# Patient Record
Sex: FEMALE | Race: WHITE | Employment: OTHER | URBAN - METROPOLITAN AREA
[De-identification: names, ages, dates, MRNs, and addresses within clinical notes are randomized per-mention and may not be internally consistent; named-entity substitution may affect disease eponyms.]

---

## 2017-03-29 PROBLEM — M17.0 PRIMARY OSTEOARTHRITIS OF BOTH KNEES: Status: ACTIVE | Noted: 2017-03-29

## 2017-11-02 PROBLEM — K59.04 CHRONIC IDIOPATHIC CONSTIPATION: Status: ACTIVE | Noted: 2017-11-02

## 2018-03-01 PROBLEM — M25.561 ACUTE PAIN OF RIGHT KNEE: Status: ACTIVE | Noted: 2018-03-01

## 2018-06-12 ENCOUNTER — HOSPITAL ENCOUNTER (OUTPATIENT)
Age: 58
Discharge: HOME | End: 2018-06-12
Payer: MEDICARE

## 2018-06-12 LAB
PARATHYROID HORMONE INTACT: 32 PG/ML (ref 15–65)
T4 FREE: 1.18 NG/DL (ref 0.93–1.7)
TSH SERPL DL<=0.05 MIU/L-ACNC: 1.92 UIU/ML (ref 0.27–4.2)
VITAMIN D 25-HYDROXY: 109 NG/ML (ref 30–100)

## 2018-06-12 PROCEDURE — 36415 COLL VENOUS BLD VENIPUNCTURE: CPT

## 2018-06-12 PROCEDURE — 83970 ASSAY OF PARATHORMONE: CPT

## 2018-06-12 PROCEDURE — 82024 ASSAY OF ACTH: CPT

## 2018-06-12 PROCEDURE — 84439 ASSAY OF FREE THYROXINE: CPT

## 2018-06-12 PROCEDURE — 84443 ASSAY THYROID STIM HORMONE: CPT

## 2018-06-12 PROCEDURE — 82306 VITAMIN D 25 HYDROXY: CPT

## 2018-06-14 LAB — ADRENOCORTICOTROPIC HORMONE: 17 PG/ML (ref 6–58)

## 2018-09-04 ENCOUNTER — HOSPITAL ENCOUNTER (OUTPATIENT)
Age: 58
Discharge: HOME | End: 2018-09-04
Payer: MEDICARE

## 2018-09-04 LAB
ALBUMIN SERPL-MCNC: 3.8 G/DL (ref 3.5–5.2)
ALP BLD-CCNC: 79 U/L (ref 35–104)
ALT SERPL-CCNC: 19 U/L (ref 0–32)
ANION GAP SERPL CALCULATED.3IONS-SCNC: 9 MMOL/L (ref 7–16)
AST SERPL-CCNC: 22 U/L (ref 0–31)
BILIRUB SERPL-MCNC: 0.5 MG/DL (ref 0–1.2)
BUN BLDV-MCNC: 16 MG/DL (ref 6–20)
CALCIUM SERPL-MCNC: 9.2 MG/DL (ref 8.6–10.2)
CHLORIDE BLD-SCNC: 107 MMOL/L (ref 98–107)
CHOLESTEROL, TOTAL: 191 MG/DL (ref 0–199)
CO2: 26 MMOL/L (ref 22–29)
CREAT SERPL-MCNC: 0.9 MG/DL (ref 0.5–1)
FERRITIN: 53 NG/ML
FOLATE: >20 NG/ML (ref 4.8–24.2)
GFR AFRICAN AMERICAN: >60
GFR NON-AFRICAN AMERICAN: >60 ML/MIN/1.73
GLUCOSE BLD-MCNC: 97 MG/DL (ref 74–109)
HCT VFR BLD CALC: 35.9 % (ref 34–48)
HEMOGLOBIN: 12 G/DL (ref 11.5–15.5)
MCH RBC QN AUTO: 31.6 PG (ref 26–35)
MCHC RBC AUTO-ENTMCNC: 33.4 % (ref 32–34.5)
MCV RBC AUTO: 94.5 FL (ref 80–99.9)
PDW BLD-RTO: 12.8 FL (ref 11.5–15)
PLATELET # BLD: 232 E9/L (ref 130–450)
PMV BLD AUTO: 9.7 FL (ref 7–12)
POTASSIUM SERPL-SCNC: 3.9 MMOL/L (ref 3.5–5)
PREALBUMIN: 20 MG/DL (ref 20–40)
RBC # BLD: 3.8 E12/L (ref 3.5–5.5)
SODIUM BLD-SCNC: 142 MMOL/L (ref 132–146)
TOTAL PROTEIN: 6.3 G/DL (ref 6.4–8.3)
TRIGL SERPL-MCNC: 88 MG/DL (ref 0–149)
VITAMIN B-12: 984 PG/ML (ref 211–946)
VITAMIN D 25-HYDROXY: 75 NG/ML (ref 30–100)
WBC # BLD: 4.4 E9/L (ref 4.5–11.5)

## 2018-09-04 PROCEDURE — 80053 COMPREHEN METABOLIC PANEL: CPT

## 2018-09-04 PROCEDURE — 36415 COLL VENOUS BLD VENIPUNCTURE: CPT

## 2018-09-04 PROCEDURE — 82746 ASSAY OF FOLIC ACID SERUM: CPT

## 2018-09-04 PROCEDURE — 84425 ASSAY OF VITAMIN B-1: CPT

## 2018-09-04 PROCEDURE — 82607 VITAMIN B-12: CPT

## 2018-09-04 PROCEDURE — 84134 ASSAY OF PREALBUMIN: CPT

## 2018-09-04 PROCEDURE — 82728 ASSAY OF FERRITIN: CPT

## 2018-09-04 PROCEDURE — 82306 VITAMIN D 25 HYDROXY: CPT

## 2018-09-04 PROCEDURE — 84478 ASSAY OF TRIGLYCERIDES: CPT

## 2018-09-04 PROCEDURE — 85027 COMPLETE CBC AUTOMATED: CPT

## 2018-09-04 PROCEDURE — 82465 ASSAY BLD/SERUM CHOLESTEROL: CPT

## 2018-09-08 LAB — VITAMIN B1 WHOLE BLOOD: 222 NMOL/L (ref 70–180)

## 2018-09-20 ENCOUNTER — INITIAL CONSULT (OUTPATIENT)
Dept: BARIATRICS/WEIGHT MGMT | Age: 58
End: 2018-09-20
Payer: MEDICARE

## 2018-09-20 ENCOUNTER — PREP FOR PROCEDURE (OUTPATIENT)
Dept: BARIATRICS/WEIGHT MGMT | Age: 58
End: 2018-09-20

## 2018-09-20 ENCOUNTER — OFFICE VISIT (OUTPATIENT)
Dept: BARIATRICS/WEIGHT MGMT | Age: 58
End: 2018-09-20
Payer: MEDICARE

## 2018-09-20 VITALS
DIASTOLIC BLOOD PRESSURE: 67 MMHG | TEMPERATURE: 98.2 F | WEIGHT: 167 LBS | SYSTOLIC BLOOD PRESSURE: 109 MMHG | HEIGHT: 65 IN | RESPIRATION RATE: 20 BRPM | HEART RATE: 88 BPM | BODY MASS INDEX: 27.82 KG/M2

## 2018-09-20 DIAGNOSIS — K91.2 MALNUTRITION FOLLOWING GASTROINTESTINAL SURGERY: Primary | ICD-10-CM

## 2018-09-20 DIAGNOSIS — Z71.3 DIETARY COUNSELING: Primary | ICD-10-CM

## 2018-09-20 PROCEDURE — 99999 PR OFFICE/OUTPT VISIT,PROCEDURE ONLY: CPT | Performed by: SURGERY

## 2018-09-20 PROCEDURE — 99214 OFFICE O/P EST MOD 30 MIN: CPT | Performed by: SURGERY

## 2018-09-20 PROCEDURE — 99212 OFFICE O/P EST SF 10 MIN: CPT

## 2018-09-20 RX ORDER — SODIUM CHLORIDE, SODIUM LACTATE, POTASSIUM CHLORIDE, CALCIUM CHLORIDE 600; 310; 30; 20 MG/100ML; MG/100ML; MG/100ML; MG/100ML
INJECTION, SOLUTION INTRAVENOUS CONTINUOUS
Status: CANCELLED | OUTPATIENT
Start: 2018-09-20 | End: 2019-09-20

## 2018-09-20 RX ORDER — MELATONIN 3 MG
10 TABLET ORAL DAILY
COMMUNITY
End: 2020-10-22

## 2018-09-20 NOTE — PROGRESS NOTES
daily Yes Historical Provider, MD   Black Currant Seed Oil 500 MG CAPS Take 500 mg by mouth daily Yes Historical Provider, MD   niacin (SLO-NIACIN) 500 MG extended release tablet Take 500 mg by mouth daily Yes Historical Provider, MD   Turmeric 500 MG CAPS Take by mouth daily Yes Historical Provider, MD   cetirizine (ZYRTEC) 10 MG tablet Take 10 mg by mouth daily Yes Historical Provider, MD   guaiFENesin (MUCINEX) 600 MG extended release tablet Take 600 mg by mouth as needed  Yes Historical Provider, MD   Ginkgo Biloba Extract 60 MG CAPS Take by mouth Daily Yes Historical Provider, MD   Probiotic Product (PROBIOTIC DAILY PO) Take by mouth nightly Yes Historical Provider, MD   doxycycline (ORACEA) 40 MG delayed release capsule Take 40 mg by mouth nightly Yes Historical Provider, MD   buprenorphine (BUTRANS) 10 MCG/HR [address] [facility] [address] 1 patch onto the skin once a week Yes Historical Provider, MD   Ascorbic Acid (VITAMIN C) 250 MG tablet Take 250 mg by mouth daily Yes Historical Provider, MD   zolpidem (AMBIEN) 10 MG tablet Take 5 mg by mouth nightly as needed for Sleep Yes Historical Provider, MD   olopatadine (PATANASE) 0.6 % SOLN nassl soln 2 sprays by Nasal route 2 times daily Yes Historical Provider, MD   topiramate (TOPAMAX) 25 MG tablet Take 25 mg by mouth 2 times daily  Yes Historical Provider, MD   Wheat Dextrin (BENEFIBER) POWD Take by mouth Daily 3 Tablespoons Yes Historical Provider, MD   folic acid (FOLVITE) 1 MG tablet Take 800 mcg by mouth daily  Yes Historical Provider, MD   SUMAtriptan (IMITREX) 100 MG tablet Take 100 mg by mouth once as needed for Migraine Yes Historical Provider, MD   diazepam (VALIUM) 5 MG tablet Take 0.5 tablets by mouth nightly  Yes Historical Provider, MD   montelukast (SINGULAIR) 10 MG tablet Take 1 tablet by mouth daily Yes Historical Provider, MD   amphetamine-dextroamphetamine (ADDERALL, 20MG,) 20 MG tablet Take 20 mg by mouth daily .  Yes Historical Provider, MD   levothyroxine

## 2018-09-21 ENCOUNTER — TELEPHONE (OUTPATIENT)
Dept: BARIATRICS/WEIGHT MGMT | Age: 58
End: 2018-09-21

## 2018-09-21 NOTE — PROGRESS NOTES
[id number] [facility]                                                                                                                     PRE OP INSTRUCTIONS FOR  Kristi Melendrez        Date: 9/21/2018    Date and time of surgery:  9/24/18     Arrival Time: to be called    1. Do not eat or drink anything after 12 midnight prior to surgery. This includes no water, chewing gum, mints or ice chips. 2. Take the following pills with a small sip of water on the morning of Surgery:  Topamax     3. Diabetics may take evening dose of insulin but none after midnight. If you feel symptomatic or low blood sugar take 1-2 ounces of apple juice only. 4. Aspirin, Ibuprofen, Advil, Naproxen, Vitamin E and other Anti-inflammatory products should be stopped  before surgery  as directed by your physician. 5. Check with your Doctor regarding stopping Plavix, Coumadin, Lovenox, Fragmin or other blood thinners. 6. Do not smoke,use illicit drugs and do not drink any alcoholic beverages 24 hours prior to surgery. 7. You may brush your teeth and gargle the morning of surgery. DO NOT SWALLOW WATER    8. You MUST make arrangements for a responsible adult to take you home after your surgery. You will not be allowed to leave alone or drive yourself home. It is strongly suggested someone stay with you the first 24 hrs. Your surgery will be cancelled if you do not have a ride home. 9. A parent/legal guardian must accompany a child scheduled for surgery and plan to stay at the hospital until the child is discharged. Please do not bring other children with you. 10. Please wear simple, loose fitting clothing to the hospital.  Devyn Mcculloughspenser not bring valuables (money, credit cards, checkbooks, etc.) Do not wear any makeup (including no eye makeup) or nail polish on your fingers or toes. 11. DO NOT wear any jewelry or piercings on day of surgery. All body piercing jewelry must be removed. 12.  Shower the night before surgery

## 2018-09-24 ENCOUNTER — HOSPITAL ENCOUNTER (OUTPATIENT)
Age: 58
Setting detail: OUTPATIENT SURGERY
Discharge: HOME | End: 2018-09-24
Attending: SURGERY | Admitting: SURGERY
Payer: MEDICARE

## 2018-09-24 VITALS
BODY MASS INDEX: 27.57 KG/M2 | RESPIRATION RATE: 16 BRPM | SYSTOLIC BLOOD PRESSURE: 110 MMHG | OXYGEN SATURATION: 98 % | DIASTOLIC BLOOD PRESSURE: 70 MMHG | TEMPERATURE: 123 F | HEART RATE: 67 BPM | WEIGHT: 165.5 LBS | HEIGHT: 65 IN

## 2018-09-24 PROCEDURE — 43235 EGD DIAGNOSTIC BRUSH WASH: CPT | Performed by: SURGERY

## 2018-09-24 PROCEDURE — 2709999900 HC NON-CHARGEABLE SUPPLY: Performed by: SURGERY

## 2018-09-24 PROCEDURE — 6370000000 HC RX 637 (ALT 250 FOR IP): Performed by: SURGERY

## 2018-09-24 PROCEDURE — 2580000003 HC RX 258: Performed by: SURGERY

## 2018-09-24 PROCEDURE — 3609017100 HC EGD: Performed by: SURGERY

## 2018-09-24 PROCEDURE — 7100000010 HC PHASE II RECOVERY - FIRST 15 MIN: Performed by: SURGERY

## 2018-09-24 PROCEDURE — 7100000011 HC PHASE II RECOVERY - ADDTL 15 MIN: Performed by: SURGERY

## 2018-09-24 PROCEDURE — C1773 RET DEV, INSERTABLE: HCPCS | Performed by: SURGERY

## 2018-09-24 RX ORDER — SODIUM CHLORIDE, SODIUM LACTATE, POTASSIUM CHLORIDE, CALCIUM CHLORIDE 600; 310; 30; 20 MG/100ML; MG/100ML; MG/100ML; MG/100ML
INJECTION, SOLUTION INTRAVENOUS CONTINUOUS
Status: DISCONTINUED | OUTPATIENT
Start: 2018-09-24 | End: 2018-09-24 | Stop reason: HOSPADM

## 2018-09-24 RX ADMIN — SODIUM CHLORIDE, POTASSIUM CHLORIDE, SODIUM LACTATE AND CALCIUM CHLORIDE: 600; 310; 30; 20 INJECTION, SOLUTION INTRAVENOUS at 11:25

## 2018-09-24 ASSESSMENT — PAIN SCALES - GENERAL
PAINLEVEL_OUTOF10: 0
PAINLEVEL_OUTOF10: 0

## 2018-09-24 ASSESSMENT — PAIN - FUNCTIONAL ASSESSMENT: PAIN_FUNCTIONAL_ASSESSMENT: 0-10

## 2018-09-24 ASSESSMENT — PAIN DESCRIPTION - DESCRIPTORS: DESCRIPTORS: BURNING;ACHING

## 2018-09-24 NOTE — PROGRESS NOTES
Pt coughing with fluids at times. Swallows pudding without difficulty. Also tolerated cookies. Will wait to drink more fluids at home. Discussed signs of aspiration pneumonia.    Jennifer Virgen  9/24/2018  [id number]

## 2018-09-24 NOTE — OP NOTE
had returned to baseline status. Anaesthesia was present during the procedure. Complications: none    OPERATIONS: The patient was placed on the table, throat was numbed with lidocaine spray. Bite block was placed. A lubricated Pediatric scope was easily passed into the upper esophagus which looked normal. The distal esophagus looked normal other than mild esophagitis and a small hiatal hernia. The scope was passed into the stomach pouch which had normal gastric mucosa. The anastomosis was open 10 mm and there was no inflammation of the jejunal mucosa with no  ulcer in the jejunum. The scope was retroflexed and there was a 1 cm hiatal hernia. The scope was passed as far down as possible and no pathology was seen so it was withdrawn. The patient tolerated the procedure well. Plan:   Ok to have the hiatal hernia repaired if the reflux gets worse.     Physician Signature: Electronically signed by Dr. Barbara Brooks M.D.

## 2018-09-24 NOTE — H&P
Franchesca Child  9/24/2018   [address]    H&P     Franchesca Child is a 46 y.o. female who is 15 years post Open Edwin-en-Y Gastric Bypass surgery. Labs normal other than low protein. Says she is swelling and the weight is up. Gets constipated with she eats carbs. Says she is not eating any protein. She had been stable at 100 pound weight loss for 7 years then had 40 pounds of weight gain over the next few years. Lost 8 pounds last year then regained it this year. Biggest problem is constipation due to narcotics for chronic pains. Used a fentanyl patch but had allergic reactions to the patches and constipation worse, now on a Butrans [facility]. She says she still can't eat a lot of meats or they get stuck and she throws up. She is meeting fluid recommendations of at least 64 ounces per day and is meeting protein recommendations. She is not exercising.     Kofjca=221 lb (75.297 kg)  Today's weight represents a total weight loss of 63 pounds since surgery with 42 pounds regained since the lowest weight. Past medical history   has a past medical history of Acid reflux; Arthritis; Asthma; Clotting disorder ([address] CRPS (complex regional pain syndrome type I); Heart palpitations; History of constipation; History of DVT (deep vein thrombosis); History of phlebitis; History of shortness of breath; Hx of blood clots; Hyperlipidemia; Hypothyroidism; Lower leg DVT (deep venous thrombosis) ([address] Migraines; and Varicose veins. Past surgical history   has a past surgical history that includes Tonsillectomy and adenoidectomy (1964); laparoscopy (1980 & 1984); Upper gastrointestinal endoscopy (06/13/2003); Edwin-en-Y Gastric Bypass (09/30/2003); hernia repair (07/02/2004); other surgical history (1997, 7/12/16); and Colonoscopy (09/27/2017). Medications   Prior to Visit Medications    Medication Sig Taking?  Authorizing Provider   DHEA 10 MG TABS Take 10 mg by mouth daily  Historical Provider, MD   Black Currant Seed Oil 500 MG CAPS Take 500 mg by mouth daily  Historical Provider, MD   niacin (SLO-NIACIN) 500 MG extended release tablet Take 500 mg by mouth daily  Historical Provider, MD   Methylnaltrexone Bromide (RELISTOR) 150 MG TABS Take 3 tablets by mouth daily  Historical Provider, MD   Turmeric 500 MG CAPS Take by mouth daily  Historical Provider, MD   cetirizine (ZYRTEC) 10 MG tablet Take 10 mg by mouth daily  Historical Provider, MD   guaiFENesin (MUCINEX) 600 MG extended release tablet Take 600 mg by mouth as needed   Historical Provider, MD   Ginkgo Biloba Extract 60 MG CAPS Take by mouth Daily  Historical Provider, MD   Probiotic Product (PROBIOTIC DAILY PO) Take by mouth nightly  Historical Provider, MD   doxycycline (ORACEA) 40 MG delayed release capsule Take 40 mg by mouth nightly  Historical Provider, MD   buprenorphine (BUTRANS) 10 MCG/HR [address] [facility] [address] 1 patch onto the skin once a week  Historical Provider, MD   Ascorbic Acid (VITAMIN C) 250 MG tablet Take 250 mg by mouth daily  Historical Provider, MD   zolpidem (AMBIEN) 10 MG tablet Take 5 mg by mouth nightly as needed for Sleep  Historical Provider, MD   olopatadine (PATANASE) 0.6 % SOLN nassl soln 2 sprays by Nasal route 2 times daily  Historical Provider, MD   topiramate (TOPAMAX) 25 MG tablet Take 25 mg by mouth 2 times daily   Historical Provider, MD   Wheat Dextrin (BENEFIBER) POWD Take by mouth Daily 3 Tablespoons  Historical Provider, MD   folic acid (FOLVITE) 1 MG tablet Take 800 mcg by mouth daily   Historical Provider, MD   SUMAtriptan (IMITREX) 100 MG tablet Take 100 mg by mouth once as needed for Migraine  Historical Provider, MD   diazepam (VALIUM) 5 MG tablet Take 0.5 tablets by mouth nightly   Historical Provider, MD   montelukast (SINGULAIR) 10 MG tablet Take 1 tablet by mouth daily  Historical Provider, MD   amphetamine-dextroamphetamine (ADDERALL, 20MG,) 20 MG tablet Take 20 mg by mouth daily . Historical Provider, MD   levothyroxine (SYNTHROID) 25 MCG tablet Take 50 mcg by mouth daily   Historical Provider, MD   rosuvastatin (CRESTOR) 5 MG tablet Take 5 mg by mouth every other day   Historical Provider, MD   diphenhydrAMINE (BENADRYL) 25 MG tablet Take 25 mg by mouth nightly. Historical Provider, MD   estradiol (ESTRACE VAGINAL) 0.1 MG/GM vaginal cream Place 2 g vaginally once a week. Historical Provider, MD   cycloSPORINE (RESTASIS) 0.05 % ophthalmic emulsion Place 1 drop into both eyes 2 times daily. Historical Provider, MD   amitriptyline (ELAVIL) 10 MG tablet Take 10 mg by mouth nightly. Historical Provider, MD   duloxetine (CYMBALTA) 20 MG capsule Take 40 mg by mouth daily   Historical Provider, MD   baclofen (LIORESAL) 20 MG tablet Take 20 mg by mouth Daily   Historical Provider, MD   esomeprazole (NEXIUM) 40 MG capsule Take 40 mg by mouth every morning (before breakfast). Historical Provider, MD   Docusate Calcium (STOOL SOFTENER PO) Take 1 capsule by mouth 3 times daily. Historical Provider, MD   polycarbophil (FIBERCON) 625 MG tablet Take 625 mg by mouth daily. Historical Provider, MD   vitamin D (ERGOCALCIFEROL) 50951 UNIT CAPS capsule Take 50,000 Units by mouth once a week   Historical Provider, MD   aspirin 81 MG EC tablet Take 81 mg by mouth daily. Historical Provider, MD   celecoxib (CELEBREX) 200 MG capsule Take 200 mg by mouth daily. Historical Provider, MD   Calcium Citrate-Vitamin D 200-250 MG-UNIT TABS Take 1 tablet by mouth 2 times daily.     Historical Provider, MD   therapeutic multivitamin-minerals (THERAGRAN-M) tablet Take 1 tablet by mouth daily   Historical Provider, MD   Ferrous Fumarate (IRON) 18 MG TBCR Take 2 tablets by mouth daily   Historical Provider, MD   Coenzyme Q-10 100 MG CAPS Take 1 capsule by mouth daily   Historical Provider, MD   Magnesium 250 MG TABS Take 500 mg by mouth every evening   Historical Provider, MD   Omega-3 Fatty Acids (OMEGA-3 FISH OIL) 1200 MG CAPS Take 1 capsule by mouth daily. Historical Provider, MD   vitamin E 400 UNIT capsule Take 400 Units by mouth daily. Historical Provider, MD       Review of Systems   Psychological ROS: positive for - anxiety and depression, narcotic use for chronic pains  Respiratory ROS: negative  Cardiovascular ROS: negative  Gastrointestinal ROS: constipation       Physical exam:      Blood pressure (!) 116/53, pulse 70, temperature 98.6 °F (37 °C), temperature source Temporal, resp. rate 14, height 5' 5\" (1.651 m), weight 165 lb 8 oz (75.1 kg), SpO2 100 %. General appearance: alert, appears stated age and cooperative  Head: Normocephalic, without obvious abnormality, atraumatic  Neck: no adenopathy, no carotid bruit, no JVD, supple, symmetrical, trachea midline and thyroid not enlarged, symmetric, no tenderness/mass/nodules  Lungs: clear to auscultation bilaterally  Heart: regular rate and rhythm  Abdomen: soft, non-tender; bowel sounds normal; no masses,  no organomegaly  Extremities: extremities normal, atraumatic, no cyanosis or edema     Assessment: severe constipation post Edwin-en- Y Gastric Bypass. Low protein level on labs. She is on Nexium daily and occasionally does complain of GERD,  does not have sleep apnea,  does not have diabetes,  does not have hypertension off medical treatment. Cholesterol and triglycerides are normal. Colonoscopy last year normal, won't need another one for 10 years.     Plan:   EGD to check for a stricture. Ok to try any of the irritable bowel constipation medications that will work for her. Need to take less narcotics. Stop eating carbs and sweets.  Go back on the Protein drinks.        Physician Signature: Electronically signed by Dr. Iggy Barajas MD

## 2018-11-01 ENCOUNTER — PREP FOR PROCEDURE (OUTPATIENT)
Dept: BARIATRICS/WEIGHT MGMT | Age: 58
End: 2018-11-01

## 2018-11-01 ENCOUNTER — TELEPHONE (OUTPATIENT)
Dept: BARIATRICS/WEIGHT MGMT | Age: 58
End: 2018-11-01

## 2018-11-01 RX ORDER — SODIUM CHLORIDE, SODIUM LACTATE, POTASSIUM CHLORIDE, CALCIUM CHLORIDE 600; 310; 30; 20 MG/100ML; MG/100ML; MG/100ML; MG/100ML
INJECTION, SOLUTION INTRAVENOUS CONTINUOUS
Status: CANCELLED | OUTPATIENT
Start: 2018-11-01

## 2018-11-01 RX ORDER — SODIUM CHLORIDE 0.9 % (FLUSH) 0.9 %
10 SYRINGE (ML) INJECTION EVERY 12 HOURS SCHEDULED
Status: CANCELLED | OUTPATIENT
Start: 2018-11-01

## 2018-11-01 RX ORDER — SODIUM CHLORIDE 0.9 % (FLUSH) 0.9 %
10 SYRINGE (ML) INJECTION PRN
Status: CANCELLED | OUTPATIENT
Start: 2018-11-01

## 2018-11-01 NOTE — TELEPHONE ENCOUNTER
Patient  Called in and is having continued problems with reflux. She has switched to Pepcid and still having same issues. Per order of Dr. Katy Gomes patient needs scheduled for [facility]. Patient would like his surgery to be done on 11/19/2018. She will see her PCP for medical clearance. Call placed to surgery schedulers and patient placed on google calendar. Chart to Dr. Katy Gomes to enter orders.

## 2018-11-13 ENCOUNTER — TELEPHONE (OUTPATIENT)
Dept: ORTHOPEDIC SURGERY | Age: 58
End: 2018-11-13

## 2018-11-16 RX ORDER — FAMOTIDINE 20 MG/1
20 TABLET, FILM COATED ORAL ONCE
COMMUNITY
End: 2022-01-18

## 2018-11-16 RX ORDER — ZINC GLUCONATE 50 MG
15 TABLET ORAL DAILY
COMMUNITY
End: 2020-11-04 | Stop reason: DRUGHIGH

## 2018-11-19 ENCOUNTER — HOSPITAL ENCOUNTER (INPATIENT)
Age: 58
LOS: 1 days | Discharge: HOME | DRG: 328 | End: 2018-11-20
Attending: SURGERY | Admitting: SURGERY
Payer: MEDICARE

## 2018-11-19 ENCOUNTER — ANESTHESIA (OUTPATIENT)
Dept: OPERATING ROOM | Age: 58
DRG: 328 | End: 2018-11-19
Payer: MEDICARE

## 2018-11-19 ENCOUNTER — ANESTHESIA EVENT (OUTPATIENT)
Dept: OPERATING ROOM | Age: 58
DRG: 328 | End: 2018-11-19
Payer: MEDICARE

## 2018-11-19 VITALS
RESPIRATION RATE: 18 BRPM | SYSTOLIC BLOOD PRESSURE: 103 MMHG | DIASTOLIC BLOOD PRESSURE: 60 MMHG | TEMPERATURE: 97.5 F | OXYGEN SATURATION: 100 %

## 2018-11-19 PROBLEM — K44.9 HIATAL HERNIA: Status: ACTIVE | Noted: 2018-11-19

## 2018-11-19 PROBLEM — K46.9 HERNIA OF ABDOMINAL CAVITY: Status: ACTIVE | Noted: 2018-11-19

## 2018-11-19 PROCEDURE — 3700000001 HC ADD 15 MINUTES (ANESTHESIA): Performed by: SURGERY

## 2018-11-19 PROCEDURE — 1200000000 HC SEMI PRIVATE

## 2018-11-19 PROCEDURE — 15734 MUSCLE-SKIN GRAFT TRUNK: CPT | Performed by: SURGERY

## 2018-11-19 PROCEDURE — 8E0W4CZ ROBOTIC ASSISTED PROCEDURE OF TRUNK REGION, PERCUTANEOUS ENDOSCOPIC APPROACH: ICD-10-PCS | Performed by: SURGERY

## 2018-11-19 PROCEDURE — 6370000000 HC RX 637 (ALT 250 FOR IP): Performed by: ANESTHESIOLOGY

## 2018-11-19 PROCEDURE — 2580000003 HC RX 258: Performed by: SURGERY

## 2018-11-19 PROCEDURE — 2709999900 HC NON-CHARGEABLE SUPPLY: Performed by: SURGERY

## 2018-11-19 PROCEDURE — 6370000000 HC RX 637 (ALT 250 FOR IP): Performed by: SURGERY

## 2018-11-19 PROCEDURE — 3700000000 HC ANESTHESIA ATTENDED CARE: Performed by: SURGERY

## 2018-11-19 PROCEDURE — 7100000010 HC PHASE II RECOVERY - FIRST 15 MIN: Performed by: SURGERY

## 2018-11-19 PROCEDURE — 3600000009 HC SURGERY ROBOT BASE: Performed by: SURGERY

## 2018-11-19 PROCEDURE — 2780000010 HC IMPLANT OTHER: Performed by: SURGERY

## 2018-11-19 PROCEDURE — S2900 ROBOTIC SURGICAL SYSTEM: HCPCS | Performed by: SURGERY

## 2018-11-19 PROCEDURE — 6360000002 HC RX W HCPCS: Performed by: NURSE ANESTHETIST, CERTIFIED REGISTERED

## 2018-11-19 PROCEDURE — C9113 INJ PANTOPRAZOLE SODIUM, VIA: HCPCS | Performed by: SURGERY

## 2018-11-19 PROCEDURE — G0378 HOSPITAL OBSERVATION PER HR: HCPCS

## 2018-11-19 PROCEDURE — 2720000010 HC SURG SUPPLY STERILE: Performed by: SURGERY

## 2018-11-19 PROCEDURE — 2500000003 HC RX 250 WO HCPCS: Performed by: NURSE ANESTHETIST, CERTIFIED REGISTERED

## 2018-11-19 PROCEDURE — 6360000002 HC RX W HCPCS: Performed by: ANESTHESIOLOGY

## 2018-11-19 PROCEDURE — 0KXJ4ZZ TRANSFER LEFT THORAX MUSCLE, PERCUTANEOUS ENDOSCOPIC APPROACH: ICD-10-PCS | Performed by: SURGERY

## 2018-11-19 PROCEDURE — 0BUT4JZ SUPPLEMENT DIAPHRAGM WITH SYNTHETIC SUBSTITUTE, PERCUTANEOUS ENDOSCOPIC APPROACH: ICD-10-PCS | Performed by: SURGERY

## 2018-11-19 PROCEDURE — C1713 ANCHOR/SCREW BN/BN,TIS/BN: HCPCS | Performed by: SURGERY

## 2018-11-19 PROCEDURE — 7100000000 HC PACU RECOVERY - FIRST 15 MIN: Performed by: SURGERY

## 2018-11-19 PROCEDURE — 6360000002 HC RX W HCPCS: Performed by: SURGERY

## 2018-11-19 PROCEDURE — 7100000001 HC PACU RECOVERY - ADDTL 15 MIN: Performed by: SURGERY

## 2018-11-19 PROCEDURE — 7100000011 HC PHASE II RECOVERY - ADDTL 15 MIN: Performed by: SURGERY

## 2018-11-19 PROCEDURE — 43282 LAP PARAESOPH HER RPR W/MESH: CPT | Performed by: SURGERY

## 2018-11-19 PROCEDURE — 3600000019 HC SURGERY ROBOT ADDTL 15MIN: Performed by: SURGERY

## 2018-11-19 PROCEDURE — 43239 EGD BIOPSY SINGLE/MULTIPLE: CPT | Performed by: SURGERY

## 2018-11-19 PROCEDURE — 2500000003 HC RX 250 WO HCPCS: Performed by: SURGERY

## 2018-11-19 DEVICE — SEALANT HEMSTAT 5ML HUM FIBRIN THROM 2 VI APPL DEV EVICEL: Type: IMPLANTABLE DEVICE | Status: FUNCTIONAL

## 2018-11-19 DEVICE — MESH SURG W8XL8CM FLAT SHT BIO-A: Type: IMPLANTABLE DEVICE | Site: ESOPHAGUS | Status: FUNCTIONAL

## 2018-11-19 RX ORDER — ONDANSETRON 2 MG/ML
4 INJECTION INTRAMUSCULAR; INTRAVENOUS EVERY 6 HOURS PRN
Status: DISCONTINUED | OUTPATIENT
Start: 2018-11-19 | End: 2018-11-20 | Stop reason: HOSPADM

## 2018-11-19 RX ORDER — SCOLOPAMINE TRANSDERMAL SYSTEM 1 MG/1
1 PATCH, EXTENDED RELEASE TRANSDERMAL
Status: DISCONTINUED | OUTPATIENT
Start: 2018-11-19 | End: 2018-11-20 | Stop reason: HOSPADM

## 2018-11-19 RX ORDER — BUPIVACAINE HYDROCHLORIDE AND EPINEPHRINE 2.5; 5 MG/ML; UG/ML
INJECTION, SOLUTION EPIDURAL; INFILTRATION; INTRACAUDAL; PERINEURAL PRN
Status: DISCONTINUED | OUTPATIENT
Start: 2018-11-19 | End: 2018-11-19 | Stop reason: HOSPADM

## 2018-11-19 RX ORDER — MIDAZOLAM HYDROCHLORIDE 1 MG/ML
INJECTION INTRAMUSCULAR; INTRAVENOUS PRN
Status: DISCONTINUED | OUTPATIENT
Start: 2018-11-19 | End: 2018-11-19 | Stop reason: SDUPTHER

## 2018-11-19 RX ORDER — NEOSTIGMINE METHYLSULFATE 0.5 MG/ML
INJECTION, SOLUTION INTRAVENOUS PRN
Status: DISCONTINUED | OUTPATIENT
Start: 2018-11-19 | End: 2018-11-19 | Stop reason: SDUPTHER

## 2018-11-19 RX ORDER — FENTANYL CITRATE 50 UG/ML
INJECTION, SOLUTION INTRAMUSCULAR; INTRAVENOUS
Status: DISPENSED
Start: 2018-11-19 | End: 2018-11-20

## 2018-11-19 RX ORDER — PROMETHAZINE HYDROCHLORIDE 25 MG/1
25 SUPPOSITORY RECTAL EVERY 6 HOURS PRN
Status: DISCONTINUED | OUTPATIENT
Start: 2018-11-19 | End: 2018-11-20 | Stop reason: HOSPADM

## 2018-11-19 RX ORDER — DEXAMETHASONE SODIUM PHOSPHATE 10 MG/ML
INJECTION INTRAMUSCULAR; INTRAVENOUS PRN
Status: DISCONTINUED | OUTPATIENT
Start: 2018-11-19 | End: 2018-11-19 | Stop reason: SDUPTHER

## 2018-11-19 RX ORDER — MEPERIDINE HYDROCHLORIDE 25 MG/ML
12.5 INJECTION INTRAMUSCULAR; INTRAVENOUS; SUBCUTANEOUS EVERY 5 MIN PRN
Status: DISCONTINUED | OUTPATIENT
Start: 2018-11-19 | End: 2018-11-19 | Stop reason: HOSPADM

## 2018-11-19 RX ORDER — SODIUM CHLORIDE, SODIUM LACTATE, POTASSIUM CHLORIDE, CALCIUM CHLORIDE 600; 310; 30; 20 MG/100ML; MG/100ML; MG/100ML; MG/100ML
INJECTION, SOLUTION INTRAVENOUS CONTINUOUS
Status: DISCONTINUED | OUTPATIENT
Start: 2018-11-19 | End: 2018-11-20 | Stop reason: HOSPADM

## 2018-11-19 RX ORDER — PANTOPRAZOLE SODIUM 40 MG/10ML
40 INJECTION, POWDER, LYOPHILIZED, FOR SOLUTION INTRAVENOUS DAILY
Status: DISCONTINUED | OUTPATIENT
Start: 2018-11-19 | End: 2018-11-20 | Stop reason: HOSPADM

## 2018-11-19 RX ORDER — ROCURONIUM BROMIDE 10 MG/ML
INJECTION, SOLUTION INTRAVENOUS PRN
Status: DISCONTINUED | OUTPATIENT
Start: 2018-11-19 | End: 2018-11-19 | Stop reason: SDUPTHER

## 2018-11-19 RX ORDER — GLYCOPYRROLATE 1 MG/5 ML
SYRINGE (ML) INTRAVENOUS PRN
Status: DISCONTINUED | OUTPATIENT
Start: 2018-11-19 | End: 2018-11-19 | Stop reason: SDUPTHER

## 2018-11-19 RX ORDER — 0.9 % SODIUM CHLORIDE 0.9 %
10 VIAL (ML) INJECTION DAILY
Status: DISCONTINUED | OUTPATIENT
Start: 2018-11-19 | End: 2018-11-20 | Stop reason: HOSPADM

## 2018-11-19 RX ORDER — ACETAMINOPHEN 160 MG/5ML
650 SUSPENSION, ORAL (FINAL DOSE FORM) ORAL EVERY 4 HOURS PRN
Status: DISCONTINUED | OUTPATIENT
Start: 2018-11-19 | End: 2018-11-20 | Stop reason: HOSPADM

## 2018-11-19 RX ORDER — SODIUM CHLORIDE 0.9 % (FLUSH) 0.9 %
10 SYRINGE (ML) INJECTION EVERY 12 HOURS SCHEDULED
Status: DISCONTINUED | OUTPATIENT
Start: 2018-11-19 | End: 2018-11-19 | Stop reason: HOSPADM

## 2018-11-19 RX ORDER — HYDROCODONE BITARTRATE AND ACETAMINOPHEN 5; 325 MG/1; MG/1
2 TABLET ORAL PRN
Status: COMPLETED | OUTPATIENT
Start: 2018-11-19 | End: 2018-11-19

## 2018-11-19 RX ORDER — DIPHENHYDRAMINE HYDROCHLORIDE 50 MG/ML
25 INJECTION INTRAMUSCULAR; INTRAVENOUS EVERY 6 HOURS PRN
Status: DISCONTINUED | OUTPATIENT
Start: 2018-11-19 | End: 2018-11-20 | Stop reason: HOSPADM

## 2018-11-19 RX ORDER — FENTANYL CITRATE 50 UG/ML
25 INJECTION, SOLUTION INTRAMUSCULAR; INTRAVENOUS EVERY 5 MIN PRN
Status: DISCONTINUED | OUTPATIENT
Start: 2018-11-19 | End: 2018-11-19 | Stop reason: HOSPADM

## 2018-11-19 RX ORDER — FENTANYL CITRATE 50 UG/ML
50 INJECTION, SOLUTION INTRAMUSCULAR; INTRAVENOUS EVERY 5 MIN PRN
Status: DISCONTINUED | OUTPATIENT
Start: 2018-11-19 | End: 2018-11-19 | Stop reason: SDUPTHER

## 2018-11-19 RX ORDER — MEPERIDINE HYDROCHLORIDE 25 MG/ML
INJECTION INTRAMUSCULAR; INTRAVENOUS; SUBCUTANEOUS
Status: DISPENSED
Start: 2018-11-19 | End: 2018-11-20

## 2018-11-19 RX ORDER — FENTANYL CITRATE 50 UG/ML
50 INJECTION, SOLUTION INTRAMUSCULAR; INTRAVENOUS EVERY 5 MIN PRN
Status: DISCONTINUED | OUTPATIENT
Start: 2018-11-19 | End: 2018-11-19 | Stop reason: HOSPADM

## 2018-11-19 RX ORDER — HYDROCODONE BITARTRATE AND ACETAMINOPHEN 5; 325 MG/1; MG/1
1 TABLET ORAL PRN
Status: COMPLETED | OUTPATIENT
Start: 2018-11-19 | End: 2018-11-19

## 2018-11-19 RX ORDER — SODIUM CHLORIDE 0.9 % (FLUSH) 0.9 %
10 SYRINGE (ML) INJECTION PRN
Status: DISCONTINUED | OUTPATIENT
Start: 2018-11-19 | End: 2018-11-19 | Stop reason: HOSPADM

## 2018-11-19 RX ORDER — KETOROLAC TROMETHAMINE 30 MG/ML
30 INJECTION, SOLUTION INTRAMUSCULAR; INTRAVENOUS EVERY 6 HOURS
Status: DISCONTINUED | OUTPATIENT
Start: 2018-11-19 | End: 2018-11-20 | Stop reason: HOSPADM

## 2018-11-19 RX ORDER — ONDANSETRON 2 MG/ML
INJECTION INTRAMUSCULAR; INTRAVENOUS PRN
Status: DISCONTINUED | OUTPATIENT
Start: 2018-11-19 | End: 2018-11-19 | Stop reason: SDUPTHER

## 2018-11-19 RX ORDER — FENTANYL CITRATE 50 UG/ML
INJECTION, SOLUTION INTRAMUSCULAR; INTRAVENOUS PRN
Status: DISCONTINUED | OUTPATIENT
Start: 2018-11-19 | End: 2018-11-19 | Stop reason: SDUPTHER

## 2018-11-19 RX ORDER — FENTANYL CITRATE 50 UG/ML
25 INJECTION, SOLUTION INTRAMUSCULAR; INTRAVENOUS EVERY 5 MIN PRN
Status: DISCONTINUED | OUTPATIENT
Start: 2018-11-19 | End: 2018-11-19 | Stop reason: SDUPTHER

## 2018-11-19 RX ORDER — PROPOFOL 10 MG/ML
INJECTION, EMULSION INTRAVENOUS PRN
Status: DISCONTINUED | OUTPATIENT
Start: 2018-11-19 | End: 2018-11-19 | Stop reason: SDUPTHER

## 2018-11-19 RX ADMIN — SODIUM CHLORIDE, POTASSIUM CHLORIDE, SODIUM LACTATE AND CALCIUM CHLORIDE: 600; 310; 30; 20 INJECTION, SOLUTION INTRAVENOUS at 10:39

## 2018-11-19 RX ADMIN — SODIUM CHLORIDE, POTASSIUM CHLORIDE, SODIUM LACTATE AND CALCIUM CHLORIDE: 600; 310; 30; 20 INJECTION, SOLUTION INTRAVENOUS at 12:05

## 2018-11-19 RX ADMIN — ROCURONIUM BROMIDE 40 MG: 10 INJECTION, SOLUTION INTRAVENOUS at 10:43

## 2018-11-19 RX ADMIN — PROPOFOL 150 MG: 10 INJECTION, EMULSION INTRAVENOUS at 10:43

## 2018-11-19 RX ADMIN — Medication 0.6 MG: at 12:51

## 2018-11-19 RX ADMIN — FENTANYL CITRATE 50 MCG: 50 INJECTION, SOLUTION INTRAMUSCULAR; INTRAVENOUS at 10:54

## 2018-11-19 RX ADMIN — FENTANYL CITRATE 50 MCG: 50 INJECTION, SOLUTION INTRAMUSCULAR; INTRAVENOUS at 13:27

## 2018-11-19 RX ADMIN — SODIUM CHLORIDE, POTASSIUM CHLORIDE, SODIUM LACTATE AND CALCIUM CHLORIDE: 600; 310; 30; 20 INJECTION, SOLUTION INTRAVENOUS at 21:38

## 2018-11-19 RX ADMIN — NEOSTIGMINE METHYLSULFATE 3 MG: 0.5 INJECTION INTRAVENOUS at 12:51

## 2018-11-19 RX ADMIN — MIDAZOLAM 2 MG: 1 INJECTION INTRAMUSCULAR; INTRAVENOUS at 10:39

## 2018-11-19 RX ADMIN — SODIUM CHLORIDE, POTASSIUM CHLORIDE, SODIUM LACTATE AND CALCIUM CHLORIDE: 600; 310; 30; 20 INJECTION, SOLUTION INTRAVENOUS at 10:22

## 2018-11-19 RX ADMIN — DIPHENHYDRAMINE HYDROCHLORIDE 25 MG: 50 INJECTION, SOLUTION INTRAMUSCULAR; INTRAVENOUS at 20:31

## 2018-11-19 RX ADMIN — FENTANYL CITRATE 25 MCG: 50 INJECTION, SOLUTION INTRAMUSCULAR; INTRAVENOUS at 14:05

## 2018-11-19 RX ADMIN — ROCURONIUM BROMIDE 10 MG: 10 INJECTION, SOLUTION INTRAVENOUS at 12:05

## 2018-11-19 RX ADMIN — FENTANYL CITRATE 50 MCG: 50 INJECTION, SOLUTION INTRAMUSCULAR; INTRAVENOUS at 12:10

## 2018-11-19 RX ADMIN — ROCURONIUM BROMIDE 10 MG: 10 INJECTION, SOLUTION INTRAVENOUS at 11:35

## 2018-11-19 RX ADMIN — DEXAMETHASONE SODIUM PHOSPHATE 10 MG: 10 INJECTION INTRAMUSCULAR; INTRAVENOUS at 10:43

## 2018-11-19 RX ADMIN — FENTANYL CITRATE 50 MCG: 50 INJECTION, SOLUTION INTRAMUSCULAR; INTRAVENOUS at 12:55

## 2018-11-19 RX ADMIN — SODIUM CHLORIDE 10 ML: 9 INJECTION INTRAMUSCULAR; INTRAVENOUS; SUBCUTANEOUS at 20:41

## 2018-11-19 RX ADMIN — FENTANYL CITRATE 50 MCG: 50 INJECTION, SOLUTION INTRAMUSCULAR; INTRAVENOUS at 13:32

## 2018-11-19 RX ADMIN — PANTOPRAZOLE SODIUM 40 MG: 40 INJECTION, POWDER, FOR SOLUTION INTRAVENOUS at 20:41

## 2018-11-19 RX ADMIN — ONDANSETRON 4 MG: 2 INJECTION INTRAMUSCULAR; INTRAVENOUS at 12:51

## 2018-11-19 RX ADMIN — FENTANYL CITRATE 100 MCG: 50 INJECTION, SOLUTION INTRAMUSCULAR; INTRAVENOUS at 10:43

## 2018-11-19 RX ADMIN — HYDROCODONE BITARTRATE AND ACETAMINOPHEN 1 TABLET: 5; 325 TABLET ORAL at 16:55

## 2018-11-19 RX ADMIN — FENTANYL CITRATE 25 MCG: 50 INJECTION, SOLUTION INTRAMUSCULAR; INTRAVENOUS at 14:13

## 2018-11-19 RX ADMIN — KETOROLAC TROMETHAMINE 30 MG: 30 INJECTION, SOLUTION INTRAMUSCULAR; INTRAVENOUS at 20:31

## 2018-11-19 ASSESSMENT — PULMONARY FUNCTION TESTS
PIF_VALUE: 24
PIF_VALUE: 28
PIF_VALUE: 30
PIF_VALUE: 26
PIF_VALUE: 30
PIF_VALUE: 24
PIF_VALUE: 2
PIF_VALUE: 29
PIF_VALUE: 28
PIF_VALUE: 20
PIF_VALUE: 31
PIF_VALUE: 28
PIF_VALUE: 29
PIF_VALUE: 29
PIF_VALUE: 32
PIF_VALUE: 29
PIF_VALUE: 25
PIF_VALUE: 29
PIF_VALUE: 28
PIF_VALUE: 2
PIF_VALUE: 20
PIF_VALUE: 28
PIF_VALUE: 28
PIF_VALUE: 31
PIF_VALUE: 30
PIF_VALUE: 29
PIF_VALUE: 19
PIF_VALUE: 31
PIF_VALUE: 29
PIF_VALUE: 30
PIF_VALUE: 2
PIF_VALUE: 1
PIF_VALUE: 30
PIF_VALUE: 2
PIF_VALUE: 30
PIF_VALUE: 16
PIF_VALUE: 27
PIF_VALUE: 28
PIF_VALUE: 2
PIF_VALUE: 31
PIF_VALUE: 32
PIF_VALUE: 16
PIF_VALUE: 1
PIF_VALUE: 29
PIF_VALUE: 30
PIF_VALUE: 24
PIF_VALUE: 30
PIF_VALUE: 25
PIF_VALUE: 30
PIF_VALUE: 29
PIF_VALUE: 27
PIF_VALUE: 31
PIF_VALUE: 27
PIF_VALUE: 20
PIF_VALUE: 31
PIF_VALUE: 26
PIF_VALUE: 21
PIF_VALUE: 17
PIF_VALUE: 27
PIF_VALUE: 31
PIF_VALUE: 31
PIF_VALUE: 28
PIF_VALUE: 29
PIF_VALUE: 20
PIF_VALUE: 28
PIF_VALUE: 28
PIF_VALUE: 30
PIF_VALUE: 2
PIF_VALUE: 24
PIF_VALUE: 31
PIF_VALUE: 14
PIF_VALUE: 28
PIF_VALUE: 27
PIF_VALUE: 28
PIF_VALUE: 30
PIF_VALUE: 30
PIF_VALUE: 29
PIF_VALUE: 29
PIF_VALUE: 30
PIF_VALUE: 2
PIF_VALUE: 27
PIF_VALUE: 28
PIF_VALUE: 30
PIF_VALUE: 31
PIF_VALUE: 28
PIF_VALUE: 30
PIF_VALUE: 2
PIF_VALUE: 29
PIF_VALUE: 26
PIF_VALUE: 26
PIF_VALUE: 27
PIF_VALUE: 27
PIF_VALUE: 31
PIF_VALUE: 21
PIF_VALUE: 26
PIF_VALUE: 28
PIF_VALUE: 29
PIF_VALUE: 27
PIF_VALUE: 29
PIF_VALUE: 27
PIF_VALUE: 30
PIF_VALUE: 28
PIF_VALUE: 25
PIF_VALUE: 31
PIF_VALUE: 30
PIF_VALUE: 28
PIF_VALUE: 31
PIF_VALUE: 30
PIF_VALUE: 2
PIF_VALUE: 27
PIF_VALUE: 18
PIF_VALUE: 31
PIF_VALUE: 30
PIF_VALUE: 27
PIF_VALUE: 29
PIF_VALUE: 30
PIF_VALUE: 29
PIF_VALUE: 27
PIF_VALUE: 2
PIF_VALUE: 27
PIF_VALUE: 29
PIF_VALUE: 2
PIF_VALUE: 26
PIF_VALUE: 29
PIF_VALUE: 30
PIF_VALUE: 28
PIF_VALUE: 19
PIF_VALUE: 16
PIF_VALUE: 29
PIF_VALUE: 28
PIF_VALUE: 29
PIF_VALUE: 30
PIF_VALUE: 29
PIF_VALUE: 31
PIF_VALUE: 27
PIF_VALUE: 29
PIF_VALUE: 30
PIF_VALUE: 30
PIF_VALUE: 26
PIF_VALUE: 28
PIF_VALUE: 25
PIF_VALUE: 30
PIF_VALUE: 2
PIF_VALUE: 29
PIF_VALUE: 31

## 2018-11-19 ASSESSMENT — PAIN DESCRIPTION - PAIN TYPE
TYPE: SURGICAL PAIN
TYPE: ACUTE PAIN;SURGICAL PAIN
TYPE: SURGICAL PAIN
TYPE: SURGICAL PAIN

## 2018-11-19 ASSESSMENT — PAIN DESCRIPTION - DESCRIPTORS
DESCRIPTORS: CONSTANT;DISCOMFORT;SHARP
DESCRIPTORS: CONSTANT;DISCOMFORT;DULL
DESCRIPTORS: ACHING;THROBBING
DESCRIPTORS: DISCOMFORT
DESCRIPTORS: SHARP;DISCOMFORT
DESCRIPTORS: CONSTANT;DISCOMFORT;DULL

## 2018-11-19 ASSESSMENT — PAIN DESCRIPTION - PROGRESSION
CLINICAL_PROGRESSION: NOT CHANGED
CLINICAL_PROGRESSION: GRADUALLY IMPROVING
CLINICAL_PROGRESSION: GRADUALLY WORSENING
CLINICAL_PROGRESSION: GRADUALLY WORSENING

## 2018-11-19 ASSESSMENT — PAIN DESCRIPTION - LOCATION
LOCATION: ABDOMEN
LOCATION: SHOULDER

## 2018-11-19 ASSESSMENT — PAIN DESCRIPTION - FREQUENCY
FREQUENCY: CONTINUOUS
FREQUENCY: INTERMITTENT
FREQUENCY: CONTINUOUS

## 2018-11-19 ASSESSMENT — PAIN DESCRIPTION - ORIENTATION
ORIENTATION: ANTERIOR;UPPER
ORIENTATION: LEFT
ORIENTATION: ANTERIOR;UPPER
ORIENTATION: ANTERIOR;UPPER;OTHER (COMMENT)
ORIENTATION: ANTERIOR;UPPER

## 2018-11-19 ASSESSMENT — PAIN - FUNCTIONAL ASSESSMENT: PAIN_FUNCTIONAL_ASSESSMENT: 0-10

## 2018-11-19 ASSESSMENT — PAIN SCALES - GENERAL
PAINLEVEL_OUTOF10: 3
PAINLEVEL_OUTOF10: 0
PAINLEVEL_OUTOF10: 0
PAINLEVEL_OUTOF10: 4
PAINLEVEL_OUTOF10: 8
PAINLEVEL_OUTOF10: 8
PAINLEVEL_OUTOF10: 7
PAINLEVEL_OUTOF10: 2
PAINLEVEL_OUTOF10: 9

## 2018-11-19 ASSESSMENT — PAIN DESCRIPTION - ONSET
ONSET: ON-GOING
ONSET: SUDDEN
ONSET: ON-GOING
ONSET: ON-GOING

## 2018-11-19 NOTE — ANESTHESIA PRE PROCEDURE
09/24/18 110/70   09/20/18 109/67       NPO Status: Time of last liquid consumption: 0800 (sip with meds)                        Time of last solid consumption: 2200                        Date of last liquid consumption: 11/19/18                        Date of last solid food consumption: 11/18/18    BMI:   Wt Readings from Last 3 Encounters:   11/19/18 167 lb (75.8 kg)   09/24/18 165 lb 8 oz (75.1 kg)   09/20/18 167 lb (75.8 kg)     Body mass index is 26.95 kg/m². CBC:   Lab Results   Component Value Date    WBC 4.4 09/04/2018    RBC 3.80 09/04/2018    HGB 12.0 09/04/2018    HCT 35.9 09/04/2018    MCV 94.5 09/04/2018    RDW 12.8 09/04/2018     09/04/2018       CMP:   Lab Results   Component Value Date     09/04/2018    K 3.9 09/04/2018     09/04/2018    CO2 26 09/04/2018    BUN 16 09/04/2018    CREATININE 0.9 09/04/2018    GFRAA >60 09/04/2018    LABGLOM >60 09/04/2018    GLUCOSE 97 09/04/2018    GLUCOSE 81 10/03/2011    PROT 6.3 09/04/2018    CALCIUM 9.2 09/04/2018    BILITOT 0.5 09/04/2018    ALKPHOS 79 09/04/2018    AST 22 09/04/2018    ALT 19 09/04/2018       POC Tests: No results for input(s): POCGLU, POCNA, POCK, POCCL, POCBUN, POCHEMO, POCHCT in the last 72 hours.     Coags:   Lab Results   Component Value Date    PROTIME 11.9 09/27/2017    INR 1.0 09/27/2017       HCG (If Applicable): No results found for: PREGTESTUR, PREGSERUM, HCG, HCGQUANT     ABGs: No results found for: PHART, PO2ART, EWM8IIF, KYL0OQD, BEART, T1JZVSOC     Type & Screen (If Applicable):  No results found for: LABABO, [address]    Anesthesia Evaluation  Patient summary reviewed  Airway: Mallampati: II  TM distance: >3 FB   Neck ROM: full  Mouth opening: > = 3 FB Dental:      Comment: Multiple caps, bridge work    Pulmonary: breath sounds clear to auscultation  (+) asthma:                            Cardiovascular:    (+) hyperlipidemia        Rhythm: regular  Rate: normal                    Neuro/Psych:   (+)

## 2018-11-19 NOTE — H&P
Keon Nair  11/19/2018   [address]    H&P     Keon Nair is a 46 y.o. female who is 15 years post Open Edwin-en-Y Gastric Bypass surgery. Takes Pepcid for heartburn. EGD showed esophagitis and a small hiatal hernia. She had been stable at 100 pound weight loss for 7 years then had 40 pounds of weight gain over the next few years. Lost 8 pounds last year then regained it this year. Has severe constipation due to narcotics for chronic pains. Used a fentanyl patch but had allergic reactions to the patches and constipation worse, now on a Butrans patch [facility] the [facility]. She says she still can't eat a lot of meats or they get stuck and she throws up.      Qwquwi=943 lb (75.297 kg)  Today's weight represents a total weight loss of 63 pounds since surgery with 42 pounds regained since the lowest weight. Past medical history   has a past medical history of Acid reflux; Arthritis; Asthma; Clotting disorder ([address] CRPS (complex regional pain syndrome type I); Heart palpitations; History of constipation; History of DVT (deep vein thrombosis); History of phlebitis; History of shortness of breath; Hx of blood clots; Hyperlipidemia; Hypothyroidism; Lower leg DVT (deep venous thrombosis) ([address] Migraines; RSD (reflex sympathetic dystrophy); and Varicose veins. Past surgical history   has a past surgical history that includes Tonsillectomy and adenoidectomy (1964); laparoscopy (1980 & 1984); Upper gastrointestinal endoscopy (06/13/2003); Edwin-en-Y Gastric Bypass (09/30/2003); hernia repair (07/02/2004); other surgical history (1997, 7/12/16); Colonoscopy (09/27/2017); and pr egd transoral biopsy single/multiple (N/A, 9/24/2018). Medications   Prior to Visit Medications    Medication Sig Taking?  Authorizing Provider   famotidine (PEPCID) 20 MG tablet Take 20 mg by mouth once Yes Historical Provider, MD   zinc gluconate 50 MG tablet Take 15 mg by mouth daily Yes Historical Provider, MD   loteprednol (ALREX) 0.2 % SUSP 1 drop 4 times daily Yes Historical Provider, MD   bisacodyl (DULCOLAX) 5 MG EC tablet Take 5 mg by mouth daily as needed for Constipation Yes Historical Provider, MD   DHEA 10 MG TABS Take 10 mg by mouth daily Yes Historical Provider, MD   Black Currant Seed Oil 500 MG CAPS Take 500 mg by mouth daily Yes Historical Provider, MD   niacin (SLO-NIACIN) 500 MG extended release tablet Take 500 mg by mouth daily Yes Historical Provider, MD   cetirizine (ZYRTEC) 10 MG tablet Take 10 mg by mouth daily Yes Historical Provider, MD   guaiFENesin (MUCINEX) 600 MG extended release tablet Take 600 mg by mouth as needed  Yes Historical Provider, MD   Ginkgo Biloba Extract 60 MG CAPS Take by mouth Daily Yes Historical Provider, MD   Probiotic Product (PROBIOTIC DAILY PO) Take by mouth nightly Yes Historical Provider, MD   buprenorphine (BUTRANS) 10 MCG/HR [address] 1 patch onto the skin once a week.  . Yes Historical Provider, MD   Ascorbic Acid (VITAMIN C) 250 MG tablet Take 250 mg by mouth daily Yes Historical Provider, MD   zolpidem (AMBIEN) 10 MG tablet Take 5 mg by mouth nightly as needed for Sleep Yes Historical Provider, MD   olopatadine (PATANASE) 0.6 % SOLN nassl soln 2 sprays by Nasal route 2 times daily Yes Historical Provider, MD   topiramate (TOPAMAX) 25 MG tablet Take 25 mg by mouth 2 times daily  Yes Historical Provider, MD   Wheat Dextrin (BENEFIBER) POWD Take by mouth Daily 3 Tablespoons Yes Historical Provider, MD   folic acid (FOLVITE) 1 MG tablet Take 800 mcg by mouth daily  Yes Historical Provider, MD   SUMAtriptan (IMITREX) 100 MG tablet Take 100 mg by mouth once as needed for Migraine Yes Historical Provider, MD   diazepam (VALIUM) 5 MG tablet Take 0.5 tablets by mouth nightly  Yes Historical Provider, MD   montelukast (SINGULAIR) 10 MG tablet Take 1 tablet by mouth daily Yes Historical Provider, MD   amphetamine-dextroamphetamine (ADDERALL, 20MG,) 20

## 2018-11-20 VITALS
TEMPERATURE: 99 F | HEIGHT: 66 IN | SYSTOLIC BLOOD PRESSURE: 120 MMHG | RESPIRATION RATE: 16 BRPM | BODY MASS INDEX: 26.84 KG/M2 | DIASTOLIC BLOOD PRESSURE: 70 MMHG | WEIGHT: 167 LBS | HEART RATE: 80 BPM | OXYGEN SATURATION: 98 %

## 2018-11-20 LAB
ALBUMIN SERPL-MCNC: 3.7 G/DL (ref 3.5–5.2)
ALP BLD-CCNC: 65 U/L (ref 35–104)
ALT SERPL-CCNC: 142 U/L (ref 0–32)
ANION GAP SERPL CALCULATED.3IONS-SCNC: 10 MMOL/L (ref 7–16)
AST SERPL-CCNC: 156 U/L (ref 0–31)
BILIRUB SERPL-MCNC: 0.6 MG/DL (ref 0–1.2)
BUN BLDV-MCNC: 11 MG/DL (ref 6–20)
CALCIUM SERPL-MCNC: 8.8 MG/DL (ref 8.6–10.2)
CHLORIDE BLD-SCNC: 104 MMOL/L (ref 98–107)
CO2: 27 MMOL/L (ref 22–29)
CREAT SERPL-MCNC: 0.7 MG/DL (ref 0.5–1)
GFR AFRICAN AMERICAN: >60
GFR NON-AFRICAN AMERICAN: >60 ML/MIN/1.73
GLUCOSE BLD-MCNC: 136 MG/DL (ref 74–99)
HCT VFR BLD CALC: 37.2 % (ref 34–48)
HEMOGLOBIN: 12.5 G/DL (ref 11.5–15.5)
MCH RBC QN AUTO: 32.1 PG (ref 26–35)
MCHC RBC AUTO-ENTMCNC: 33.6 % (ref 32–34.5)
MCV RBC AUTO: 95.6 FL (ref 80–99.9)
PDW BLD-RTO: 13 FL (ref 11.5–15)
PLATELET # BLD: 239 E9/L (ref 130–450)
PMV BLD AUTO: 9.6 FL (ref 7–12)
POTASSIUM SERPL-SCNC: 4.4 MMOL/L (ref 3.5–5)
RBC # BLD: 3.89 E12/L (ref 3.5–5.5)
SODIUM BLD-SCNC: 141 MMOL/L (ref 132–146)
TOTAL PROTEIN: 5.9 G/DL (ref 6.4–8.3)
WBC # BLD: 8.8 E9/L (ref 4.5–11.5)

## 2018-11-20 PROCEDURE — C9113 INJ PANTOPRAZOLE SODIUM, VIA: HCPCS | Performed by: SURGERY

## 2018-11-20 PROCEDURE — 96374 THER/PROPH/DIAG INJ IV PUSH: CPT

## 2018-11-20 PROCEDURE — 85027 COMPLETE CBC AUTOMATED: CPT

## 2018-11-20 PROCEDURE — 96376 TX/PRO/DX INJ SAME DRUG ADON: CPT

## 2018-11-20 PROCEDURE — G0378 HOSPITAL OBSERVATION PER HR: HCPCS

## 2018-11-20 PROCEDURE — 96372 THER/PROPH/DIAG INJ SC/IM: CPT

## 2018-11-20 PROCEDURE — 80053 COMPREHEN METABOLIC PANEL: CPT

## 2018-11-20 PROCEDURE — 96375 TX/PRO/DX INJ NEW DRUG ADDON: CPT

## 2018-11-20 PROCEDURE — 36415 COLL VENOUS BLD VENIPUNCTURE: CPT

## 2018-11-20 PROCEDURE — 6360000002 HC RX W HCPCS: Performed by: SURGERY

## 2018-11-20 PROCEDURE — 2580000003 HC RX 258: Performed by: SURGERY

## 2018-11-20 RX ADMIN — ENOXAPARIN SODIUM 40 MG: 40 INJECTION SUBCUTANEOUS at 09:07

## 2018-11-20 RX ADMIN — KETOROLAC TROMETHAMINE 30 MG: 30 INJECTION, SOLUTION INTRAMUSCULAR; INTRAVENOUS at 02:06

## 2018-11-20 RX ADMIN — SODIUM CHLORIDE 10 ML: 9 INJECTION INTRAMUSCULAR; INTRAVENOUS; SUBCUTANEOUS at 09:07

## 2018-11-20 RX ADMIN — SODIUM CHLORIDE, POTASSIUM CHLORIDE, SODIUM LACTATE AND CALCIUM CHLORIDE: 600; 310; 30; 20 INJECTION, SOLUTION INTRAVENOUS at 02:06

## 2018-11-20 RX ADMIN — DIPHENHYDRAMINE HYDROCHLORIDE 25 MG: 50 INJECTION, SOLUTION INTRAMUSCULAR; INTRAVENOUS at 02:10

## 2018-11-20 RX ADMIN — PANTOPRAZOLE SODIUM 40 MG: 40 INJECTION, POWDER, FOR SOLUTION INTRAVENOUS at 09:07

## 2018-11-20 RX ADMIN — DIPHENHYDRAMINE HYDROCHLORIDE 25 MG: 50 INJECTION, SOLUTION INTRAMUSCULAR; INTRAVENOUS at 09:07

## 2018-11-20 RX ADMIN — KETOROLAC TROMETHAMINE 30 MG: 30 INJECTION, SOLUTION INTRAMUSCULAR; INTRAVENOUS at 09:07

## 2018-11-20 ASSESSMENT — PAIN SCALES - GENERAL
PAINLEVEL_OUTOF10: 5
PAINLEVEL_OUTOF10: 4
PAINLEVEL_OUTOF10: 7
PAINLEVEL_OUTOF10: 2

## 2018-11-20 ASSESSMENT — PAIN DESCRIPTION - LOCATION: LOCATION: ABDOMEN

## 2018-11-20 ASSESSMENT — PAIN DESCRIPTION - PAIN TYPE: TYPE: ACUTE PAIN;SURGICAL PAIN

## 2018-11-20 ASSESSMENT — PAIN DESCRIPTION - DESCRIPTORS: DESCRIPTORS: ACHING;DISCOMFORT;SORE

## 2018-11-20 ASSESSMENT — PAIN DESCRIPTION - ORIENTATION: ORIENTATION: MID;LEFT;RIGHT

## 2018-11-20 NOTE — DISCHARGE SUMMARY
tenderness/mass/nodules  Lungs: clear to auscultation bilaterally  Heart: regular rate and rhythm  Abdomen: soft, incisions healing well, glue in place.   Extremities: extremities normal, atraumatic, no cyanosis or edema    Condition at discharge:  Stable  Disposition: home    Patient Instructions:      Medication List      CONTINUE taking these medications    ADDERALL (20MG) 20 MG tablet  Generic drug:  amphetamine-dextroamphetamine     amitriptyline 10 MG tablet  Commonly known as:  ELAVIL     aspirin 81 MG EC tablet     baclofen 20 MG tablet  Commonly known as:  LIORESAL     BENEFIBER Powd     bisacodyl 5 MG EC tablet  Commonly known as:  DULCOLAX     Black Currant Seed Oil 500 MG Caps     BUTRANS 10 MCG/HR Ptwk  Generic drug:  buprenorphine     Calcium Citrate-Vitamin D 200-250 MG-UNIT Tabs     CELEBREX 200 MG capsule  Generic drug:  celecoxib     cetirizine 10 MG tablet  Commonly known as:  ZYRTEC     Coenzyme Q-10 100 MG Caps     CRESTOR 5 MG tablet  Generic drug:  rosuvastatin     DHEA 10 MG Tabs     diazepam 5 MG tablet  Commonly known as:  VALIUM     diphenhydrAMINE 25 MG tablet  Commonly known as:  BENADRYL     DULoxetine 20 MG extended release capsule  Commonly known as:  CYMBALTA     ESTRACE VAGINAL 0.1 MG/GM vaginal cream  Generic drug:  estradiol     famotidine 20 MG tablet  Commonly known as:  PEPCID     FIBERCON 625 MG tablet  Generic drug:  polycarbophil     folic acid 1 MG tablet  Commonly known as:  FOLVITE     Ginkgo Biloba Extract 60 MG Caps     guaiFENesin 600 MG extended release tablet  Commonly known as:  MUCINEX     Iron 18 MG Tbcr     levothyroxine 25 MCG tablet  Commonly known as:  SYNTHROID     loteprednol 0.2 % Susp  Commonly known as:  ALREX     magnesium 250 MG Tabs tablet  Commonly known as:  MAGNESIUM-OXIDE     montelukast 10 MG tablet  Commonly known as:  SINGULAIR     niacin 500 MG extended release tablet  Commonly known as:  SLO-NIACIN     olopatadine 0.6 % Soln nassl

## 2018-11-29 ENCOUNTER — OFFICE VISIT (OUTPATIENT)
Dept: BARIATRICS/WEIGHT MGMT | Age: 58
End: 2018-11-29
Payer: MEDICARE

## 2018-11-29 VITALS
RESPIRATION RATE: 20 BRPM | BODY MASS INDEX: 26.82 KG/M2 | DIASTOLIC BLOOD PRESSURE: 77 MMHG | SYSTOLIC BLOOD PRESSURE: 120 MMHG | HEIGHT: 65 IN | HEART RATE: 89 BPM | WEIGHT: 161 LBS | TEMPERATURE: 97.8 F

## 2018-11-29 DIAGNOSIS — K44.9 HIATAL HERNIA: Primary | ICD-10-CM

## 2018-11-29 PROCEDURE — 99024 POSTOP FOLLOW-UP VISIT: CPT | Performed by: SURGERY

## 2018-11-29 PROCEDURE — 99211 OFF/OP EST MAY X REQ PHY/QHP: CPT

## 2018-11-29 RX ORDER — OXYCODONE AND ACETAMINOPHEN 10; 325 MG/1; MG/1
1 TABLET ORAL PRN
COMMUNITY
End: 2018-12-20 | Stop reason: ALTCHOICE

## 2018-11-29 NOTE — PROGRESS NOTES
single/multiple (N/A, 9/24/2018); Abdomen surgery; Endoscopy, colon, diagnostic; and pr lap, repair paraesophageal hernia, incl fundoplasty w/o mesh (N/A, 11/19/2018). Medications   Prior to Visit Medications    Medication Sig Taking? Authorizing Provider   oxyCODONE-acetaminophen (PERCOCET)  MG per tablet Take 1 tablet by mouth as needed for Pain. . Yes Historical Provider, MD   famotidine (PEPCID) 20 MG tablet Take 20 mg by mouth once Yes Historical Provider, MD   zinc gluconate 50 MG tablet Take 15 mg by mouth daily Yes Historical Provider, MD   loteprednol (ALREX) 0.2 % SUSP 1 drop 4 times daily Yes Historical Provider, MD   bisacodyl (DULCOLAX) 5 MG EC tablet Take 5 mg by mouth daily as needed for Constipation Yes Historical Provider, MD   DHEA 10 MG TABS Take 10 mg by mouth daily Yes Historical Provider, MD   Black Currant Seed Oil 500 MG CAPS Take 500 mg by mouth daily Yes Historical Provider, MD   niacin (SLO-NIACIN) 500 MG extended release tablet Take 500 mg by mouth daily Yes Historical Provider, MD   cetirizine (ZYRTEC) 10 MG tablet Take 10 mg by mouth daily Yes Historical Provider, MD   guaiFENesin (MUCINEX) 600 MG extended release tablet Take 600 mg by mouth as needed  Yes Historical Provider, MD   Ginkgo Biloba Extract 60 MG CAPS Take by mouth Daily Yes Historical Provider, MD   Probiotic Product (PROBIOTIC DAILY PO) Take by mouth nightly Yes Historical Provider, MD   Ascorbic Acid (VITAMIN C) 250 MG tablet Take 250 mg by mouth daily Yes Historical Provider, MD   zolpidem (AMBIEN) 10 MG tablet Take 5 mg by mouth nightly as needed for Sleep Yes Historical Provider, MD   olopatadine (PATANASE) 0.6 % SOLN nassl soln 2 sprays by Nasal route 2 times daily Yes Historical Provider, MD   topiramate (TOPAMAX) 25 MG tablet Take 25 mg by mouth 2 times daily  Yes Historical Provider, MD   Wheat Dextrin (BENEFIBER) POWD Take by mouth Daily 3 Tablespoons Yes Historical Provider, MD   folic acid (FOLVITE) 1 MG tablet Take 800 mcg by mouth daily  Yes Historical Provider, MD   SUMAtriptan (IMITREX) 100 MG tablet Take 100 mg by mouth once as needed for Migraine Yes Historical Provider, MD   diazepam (VALIUM) 5 MG tablet Take 0.5 tablets by mouth nightly  Yes Historical Provider, MD   montelukast (SINGULAIR) 10 MG tablet Take 1 tablet by mouth daily Yes Historical Provider, MD   amphetamine-dextroamphetamine (ADDERALL, 20MG,) 20 MG tablet Take 20 mg by mouth daily . Yes Historical Provider, MD   levothyroxine (SYNTHROID) 25 MCG tablet Take 50 mcg by mouth daily  Yes Historical Provider, MD   rosuvastatin (CRESTOR) 5 MG tablet Take 5 mg by mouth every other day  Yes Historical Provider, MD   diphenhydrAMINE (BENADRYL) 25 MG tablet Take 25 mg by mouth nightly. Yes Historical Provider, MD   estradiol (ESTRACE VAGINAL) 0.1 MG/GM vaginal cream Place 2 g vaginally once a week. Yes Historical Provider, MD   cycloSPORINE (RESTASIS) 0.05 % ophthalmic emulsion Place 1 drop into both eyes 2 times daily. Yes Historical Provider, MD   amitriptyline (ELAVIL) 10 MG tablet Take 10 mg by mouth nightly. Yes Historical Provider, MD   duloxetine (CYMBALTA) 20 MG capsule Take 40 mg by mouth daily  Yes Historical Provider, MD   baclofen (LIORESAL) 20 MG tablet Take 20 mg by mouth Daily  Yes Historical Provider, MD   Docusate Calcium (STOOL SOFTENER PO) Take 1 capsule by mouth 3 times daily. Yes Historical Provider, MD   polycarbophil (FIBERCON) 625 MG tablet Take 625 mg by mouth daily. Yes Historical Provider, MD   vitamin D (ERGOCALCIFEROL) 62505 UNIT CAPS capsule Take 50,000 Units by mouth once a week  Yes Historical Provider, MD   aspirin 81 MG EC tablet Take 81 mg by mouth daily. Yes Historical Provider, MD   celecoxib (CELEBREX) 200 MG capsule Take 200 mg by mouth daily. Yes Historical Provider, MD   Calcium Citrate-Vitamin D 200-250 MG-UNIT TABS Take 1 tablet by mouth 2 times daily.    Yes Historical Provider, MD   therapeutic multivitamin-minerals (THERAGRAN-M) tablet Take 1 tablet by mouth daily  Yes Historical Provider, MD   Ferrous Fumarate (IRON) 18 MG TBCR Take 2 tablets by mouth daily  Yes Historical Provider, MD   Coenzyme Q-10 100 MG CAPS Take 1 capsule by mouth daily  Yes Historical Provider, MD   Magnesium 250 MG TABS Take 500 mg by mouth every evening  Yes Historical Provider, MD   Omega-3 Fatty Acids (OMEGA-3 FISH OIL) 1200 MG CAPS Take 1 capsule by mouth daily. Yes Historical Provider, MD   vitamin E 400 UNIT capsule Take 400 Units by mouth daily. Yes Historical Provider, MD   buprenorphine (BUTRANS) 10 MCG/HR [address] 1 patch onto the skin once a week. Annie Bryson Historical Provider, MD       Review of Systems   Psychological ROS: positive for - anxiety and depression, narcotic use for chronic pains  Respiratory ROS: negative  Cardiovascular ROS: negative  Gastrointestinal ROS: constipation       Physical exam:      Blood pressure 120/77, pulse 89, temperature 97.8 °F (36.6 °C), temperature source Temporal, resp. rate 20, height 5' 5\" (1.651 m), weight 161 lb (73 kg). General appearance: alert, appears stated age and cooperative  Head: Normocephalic, without obvious abnormality, atraumatic  Neck: no adenopathy, no carotid bruit, no JVD, supple, symmetrical, trachea midline and thyroid not enlarged, symmetric, no tenderness/mass/nodules  Lungs: clear to auscultation bilaterally  Heart: regular rate and rhythm  Abdomen: soft, non-tender; bowel sounds normal; no masses,  no organomegaly  Extremities: extremities normal, atraumatic, no cyanosis or edema     Assessment:  doing well post hiatal hernia repair. Still has constipation trying to get off narcotics. Stopped the Nexium, now on Pepcid,  does not have sleep apnea,  does not have diabetes,  does not have hypertension off medical treatment.  Cholesterol and triglycerides are normal. Colonoscopy last year normal, won't need another one for 10 years.     Plan:   Ok to try any of the irritable bowel constipation medications that will work for her. Need to take less narcotics. Stop eating carbs and sweets.  Go back on the Protein drinks.        Physician Signature: Electronically signed by Dr. Elza Tran MD

## 2018-11-30 ENCOUNTER — TELEPHONE (OUTPATIENT)
Dept: BARIATRICS/WEIGHT MGMT | Age: 58
End: 2018-11-30

## 2018-12-11 ENCOUNTER — TELEPHONE (OUTPATIENT)
Dept: ORTHOPEDIC SURGERY | Age: 58
End: 2018-12-11

## 2018-12-19 NOTE — TELEPHONE ENCOUNTER
TSB has returned from [facility] for Synvisc One injections scheduled for 12/20/2018. Pt's mother Russ Sidhu will be seen on same day.

## 2018-12-20 ENCOUNTER — OFFICE VISIT (OUTPATIENT)
Dept: ORTHOPEDIC SURGERY | Age: 58
End: 2018-12-20
Payer: MEDICARE

## 2018-12-20 VITALS
WEIGHT: 156 LBS | DIASTOLIC BLOOD PRESSURE: 69 MMHG | HEART RATE: 85 BPM | HEIGHT: 66 IN | BODY MASS INDEX: 25.07 KG/M2 | TEMPERATURE: 97.5 F | SYSTOLIC BLOOD PRESSURE: 126 MMHG

## 2018-12-20 DIAGNOSIS — M79.645 BILATERAL THUMB PAIN: ICD-10-CM

## 2018-12-20 DIAGNOSIS — M17.0 PRIMARY OSTEOARTHRITIS OF BOTH KNEES: Primary | ICD-10-CM

## 2018-12-20 DIAGNOSIS — M79.644 BILATERAL THUMB PAIN: ICD-10-CM

## 2018-12-20 PROCEDURE — 99214 OFFICE O/P EST MOD 30 MIN: CPT | Performed by: ORTHOPAEDIC SURGERY

## 2018-12-20 PROCEDURE — 20610 DRAIN/INJ JOINT/BURSA W/O US: CPT | Performed by: ORTHOPAEDIC SURGERY

## 2018-12-20 PROCEDURE — 20600 DRAIN/INJ JOINT/BURSA W/O US: CPT | Performed by: ORTHOPAEDIC SURGERY

## 2018-12-20 RX ORDER — TRIAMCINOLONE ACETONIDE 40 MG/ML
20 INJECTION, SUSPENSION INTRA-ARTICULAR; INTRAMUSCULAR ONCE
Status: COMPLETED | OUTPATIENT
Start: 2018-12-20 | End: 2018-12-20

## 2018-12-20 RX ORDER — BUPIVACAINE HYDROCHLORIDE 2.5 MG/ML
2 INJECTION, SOLUTION INFILTRATION; PERINEURAL ONCE
Status: COMPLETED | OUTPATIENT
Start: 2018-12-20 | End: 2018-12-20

## 2018-12-20 RX ADMIN — BUPIVACAINE HYDROCHLORIDE 5 MG: 2.5 INJECTION, SOLUTION INFILTRATION; PERINEURAL at 15:53

## 2018-12-20 RX ADMIN — TRIAMCINOLONE ACETONIDE 20 MG: 40 INJECTION, SUSPENSION INTRA-ARTICULAR; INTRAMUSCULAR at 15:54

## 2018-12-21 NOTE — PROGRESS NOTES
MG TBCR Take 2 tablets by mouth daily       Coenzyme Q-10 100 MG CAPS Take 1 capsule by mouth daily       Magnesium 250 MG TABS Take 500 mg by mouth every evening       Omega-3 Fatty Acids (OMEGA-3 FISH OIL) 1200 MG CAPS Take 1 capsule by mouth daily.  vitamin E 400 UNIT capsule Take 400 Units by mouth daily. No current facility-administered medications for this visit. Exam: Leg lengths equal hip motion painless. Bilateral knees show synovitis mild deformities non-correctable, primarily patellofemoral pain and crepitus with range of motion 0-125. No active effusion, no distal motor sensory deficits skin warm and dry. Bilateral hands show isolated pain tenderness at the [facility] of both thumbs with positive pain on axial load and rotation mild crepitus. No intrinsic or thenar atrophy. Skin warm and dry. Radiographs: 3 view x-rays of both hands today confirmed clinically suspected complete loss of cartilage at the [facility] of both thumbs with mild deformity significant sclerosis and early osteophyte formation consistent with CMC arthritis bilateral thumbs. Pramod was seen today for knee pain. Diagnoses and all orders for this visit:    Primary osteoarthritis of both knees  -     VA ARTHROCENTESIS ASPIR&/INJ MAJOR JT/BURSA W/O US    Bilateral thumb pain  -     XR HAND LEFT (MIN 3 VIEWS);  Future  -     XR HAND RIGHT (MIN 3 VIEWS)  -     VA ARTHROCENTESIS ASPIR&/INJ SMALL JT/BURSA W/O US    Other orders  -     Hylan injection 48 mg; Inject 6 mLs into the articular space once  -     Hylan injection 48 mg; Inject 6 mLs into the articular space once  -     triamcinolone acetonide (KENALOG-40) injection 20 mg; Inject 0.5 mLs into the articular space once  -     bupivacaine (MARCAINE) 0.25 % injection 5 mg; Inject 2 mLs into the articular space once     Treatment options were reviewed, at the patient's request I injected Synvisc 1 into each knee through an anteromedial approach is sterile technique no

## 2019-01-31 ENCOUNTER — OFFICE VISIT (OUTPATIENT)
Dept: BARIATRICS/WEIGHT MGMT | Age: 59
End: 2019-01-31
Payer: MEDICARE

## 2019-01-31 VITALS
DIASTOLIC BLOOD PRESSURE: 78 MMHG | HEIGHT: 65 IN | BODY MASS INDEX: 26.33 KG/M2 | SYSTOLIC BLOOD PRESSURE: 133 MMHG | RESPIRATION RATE: 18 BRPM | TEMPERATURE: 98 F | HEART RATE: 93 BPM | WEIGHT: 158 LBS

## 2019-01-31 DIAGNOSIS — K44.9 HIATAL HERNIA: Primary | ICD-10-CM

## 2019-01-31 PROCEDURE — 99211 OFF/OP EST MAY X REQ PHY/QHP: CPT

## 2019-01-31 PROCEDURE — 99024 POSTOP FOLLOW-UP VISIT: CPT | Performed by: SURGERY

## 2019-01-31 RX ORDER — ALBUTEROL SULFATE 90 UG/1
2 AEROSOL, METERED RESPIRATORY (INHALATION) EVERY 6 HOURS PRN
COMMUNITY

## 2019-01-31 RX ORDER — IPRATROPIUM BROMIDE 21 UG/1
2 SPRAY, METERED NASAL EVERY 12 HOURS
COMMUNITY
End: 2022-01-18

## 2019-01-31 RX ORDER — DOXYCYCLINE HYCLATE 100 MG/1
100 CAPSULE ORAL
Refills: 0 | COMMUNITY
Start: 2019-01-27 | End: 2020-10-22

## 2019-01-31 RX ORDER — PROMETHAZINE HYDROCHLORIDE 25 MG/1
TABLET ORAL PRN
Refills: 1 | COMMUNITY
Start: 2018-12-21

## 2019-01-31 RX ORDER — CLINDAMYCIN AND BENZOYL PEROXIDE 10; 50 MG/G; MG/G
GEL TOPICAL
COMMUNITY
Start: 2019-01-09

## 2019-01-31 RX ORDER — CODEINE PHOSPHATE AND GUAIFENESIN 10; 100 MG/5ML; MG/5ML
SOLUTION ORAL PRN
Refills: 0 | COMMUNITY
Start: 2019-01-27 | End: 2019-10-24 | Stop reason: ALTCHOICE

## 2019-01-31 RX ORDER — DULOXETIN HYDROCHLORIDE 60 MG/1
60 CAPSULE, DELAYED RELEASE ORAL DAILY
Refills: 2 | COMMUNITY
Start: 2019-01-03

## 2019-04-16 ENCOUNTER — OFFICE VISIT (OUTPATIENT)
Dept: ORTHOPEDIC SURGERY | Age: 59
End: 2019-04-16
Payer: MEDICARE

## 2019-04-16 VITALS
DIASTOLIC BLOOD PRESSURE: 62 MMHG | HEART RATE: 83 BPM | WEIGHT: 156 LBS | BODY MASS INDEX: 25.07 KG/M2 | TEMPERATURE: 97.8 F | HEIGHT: 66 IN | SYSTOLIC BLOOD PRESSURE: 104 MMHG

## 2019-04-16 DIAGNOSIS — M18.0 PRIMARY OSTEOARTHRITIS OF BOTH FIRST CARPOMETACARPAL JOINTS: Primary | ICD-10-CM

## 2019-04-16 DIAGNOSIS — M79.644 BILATERAL THUMB PAIN: ICD-10-CM

## 2019-04-16 DIAGNOSIS — M79.645 BILATERAL THUMB PAIN: ICD-10-CM

## 2019-04-16 PROCEDURE — 99213 OFFICE O/P EST LOW 20 MIN: CPT | Performed by: ORTHOPAEDIC SURGERY

## 2019-04-16 PROCEDURE — 20600 DRAIN/INJ JOINT/BURSA W/O US: CPT | Performed by: ORTHOPAEDIC SURGERY

## 2019-04-16 RX ORDER — LIDOCAINE HYDROCHLORIDE 10 MG/ML
0.5 INJECTION, SOLUTION INFILTRATION; PERINEURAL ONCE
Status: COMPLETED | OUTPATIENT
Start: 2019-04-16 | End: 2019-04-16

## 2019-04-16 RX ORDER — TRIAMCINOLONE ACETONIDE 40 MG/ML
20 INJECTION, SUSPENSION INTRA-ARTICULAR; INTRAMUSCULAR ONCE
Status: COMPLETED | OUTPATIENT
Start: 2019-04-16 | End: 2019-04-16

## 2019-04-16 RX ADMIN — LIDOCAINE HYDROCHLORIDE 0.5 ML: 10 INJECTION, SOLUTION INFILTRATION; PERINEURAL at 14:18

## 2019-04-16 RX ADMIN — TRIAMCINOLONE ACETONIDE 20 MG: 40 INJECTION, SUSPENSION INTRA-ARTICULAR; INTRAMUSCULAR at 14:19

## 2019-04-16 NOTE — PROGRESS NOTES
doxycycline hyclate (VIBRAMYCIN) 100 MG capsule Take 100 mg by mouth  0    clindamycin-benzoyl peroxide (BENZACLIN) 1-5 % gel       DULoxetine (CYMBALTA) 60 MG extended release capsule Take 60 mg by mouth daily  2    guaiFENesin-codeine (GUAIFENESIN AC) 100-10 MG/5ML liquid as needed. Sara January 0    promethazine (PHENERGAN) 25 MG tablet as needed  1    albuterol sulfate  (90 Base) MCG/ACT inhaler Inhale 2 puffs into the lungs every 6 hours as needed for Wheezing      diclofenac sodium 1 % GEL Apply 2 g topically 2 times daily      ipratropium (ATROVENT) 0.03 % nasal spray 2 sprays by Nasal route every 12 hours      famotidine (PEPCID) 20 MG tablet Take 20 mg by mouth once      zinc gluconate 50 MG tablet Take 15 mg by mouth daily      loteprednol (ALREX) 0.2 % SUSP 1 drop 4 times daily      bisacodyl (DULCOLAX) 5 MG EC tablet Take 5 mg by mouth daily as needed for Constipation      DHEA 10 MG TABS Take 10 mg by mouth daily      Black Currant Seed Oil 500 MG CAPS Take 500 mg by mouth daily      niacin (SLO-NIACIN) 500 MG extended release tablet Take 500 mg by mouth daily      cetirizine (ZYRTEC) 10 MG tablet Take 10 mg by mouth daily      guaiFENesin (MUCINEX) 600 MG extended release tablet Take 600 mg by mouth as needed       Ginkgo Biloba Extract 60 MG CAPS Take by mouth Daily      Probiotic Product (PROBIOTIC DAILY PO) Take by mouth nightly      Ascorbic Acid (VITAMIN C) 250 MG tablet Take 250 mg by mouth daily      zolpidem (AMBIEN) 10 MG tablet Take 5 mg by mouth nightly as needed for Sleep      olopatadine (PATANASE) 0.6 % SOLN nassl soln 2 sprays by Nasal route 2 times daily      topiramate (TOPAMAX) 25 MG tablet Take 25 mg by mouth 2 times daily       Wheat Dextrin (BENEFIBER) POWD Take by mouth Daily 3 Tablespoons      folic acid (FOLVITE) 1 MG tablet Take 800 mcg by mouth daily       SUMAtriptan (IMITREX) 100 MG tablet Take 100 mg by mouth once as needed for Migraine      diazepam (VALIUM) 5 MG tablet Take 0.5 tablets by mouth nightly       montelukast (SINGULAIR) 10 MG tablet Take 1 tablet by mouth daily      amphetamine-dextroamphetamine (ADDERALL, 20MG,) 20 MG tablet Take 20 mg by mouth daily .  levothyroxine (SYNTHROID) 25 MCG tablet Take 50 mcg by mouth daily       rosuvastatin (CRESTOR) 5 MG tablet Take 5 mg by mouth every other day       diphenhydrAMINE (BENADRYL) 25 MG tablet Take 25 mg by mouth nightly.  estradiol (ESTRACE VAGINAL) 0.1 MG/GM vaginal cream Place 2 g vaginally once a week.  cycloSPORINE (RESTASIS) 0.05 % ophthalmic emulsion Place 1 drop into both eyes 2 times daily.  amitriptyline (ELAVIL) 10 MG tablet Take 10 mg by mouth nightly.  baclofen (LIORESAL) 20 MG tablet Take 20 mg by mouth Daily       Docusate Calcium (STOOL SOFTENER PO) Take 1 capsule by mouth 3 times daily.  polycarbophil (FIBERCON) 625 MG tablet Take 625 mg by mouth daily.  vitamin D (ERGOCALCIFEROL) 34315 UNIT CAPS capsule Take 50,000 Units by mouth once a week       aspirin 81 MG EC tablet Take 81 mg by mouth daily.  celecoxib (CELEBREX) 200 MG capsule Take 200 mg by mouth daily.  Calcium Citrate-Vitamin D 200-250 MG-UNIT TABS Take 1 tablet by mouth 2 times daily.  therapeutic multivitamin-minerals (THERAGRAN-M) tablet Take 1 tablet by mouth daily       Ferrous Fumarate (IRON) 18 MG TBCR Take 2 tablets by mouth daily       Coenzyme Q-10 100 MG CAPS Take 1 capsule by mouth daily       Magnesium 250 MG TABS Take 500 mg by mouth every evening       Omega-3 Fatty Acids (OMEGA-3 FISH OIL) 1200 MG CAPS Take 1 capsule by mouth daily.  vitamin E 400 UNIT capsule Take 400 Units by mouth daily. No current facility-administered medications for this visit.         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, NEAL-1 genotype Heterozygote 5G/4G    CRPS (complex regional pain syndrome type I)     Heart palpitations     History of constipation     History of DVT (deep vein thrombosis)     History of phlebitis     History of shortness of breath     Hx of blood clots     Hyperlipidemia     Hypothyroidism     Lower leg DVT (deep venous thrombosis) (HCC)     Migraines     RSD (reflex sympathetic dystrophy)     Left buttocks and Spine     Varicose veins        Past Surgical History:   Procedure Laterality Date    ABDOMEN SURGERY      COLONOSCOPY  09/27/2017    ENDOSCOPY, COLON, DIAGNOSTIC      HERNIA REPAIR  07/02/2004    Dr. Delfino Foster - Repair of Incisional Hernia with Composix Dual Mesh   [address], 7/12/16    Dorsal column stimulator [facility], Battery change stimulator 7/12/2016    MT EGD TRANSORAL BIOPSY SINGLE/MULTIPLE N/A 9/24/2018    EGD ESOPHAGOGASTRODUODENOSCOPY performed by Radu Painter MD at [address] [place], REPAIR PARAESOPHAGEAL HERNIA, INCL FUNDOPLASTY W/O MESH N/A 11/19/2018    ROBOTIC XI ASSISTED LAPAROSCOPIC HIATAL HERNIA REPAIR WITH MESH performed by Radu Painter MD at [address]  09/30/2003    Dr. Maia Johnson  06/13/2003    Dr. Loyd Reynolds [facility] - Findings:  Moderate gastritis with linear erosions, Small Hiatal Hernia with columnar epithelium extending up into the distal esophagus       Allergies   Allergen Reactions    Ketorolac Tromethamine Hives    Lyrica [Pregabalin] Itching, Swelling and Other (See Comments)     Memory Lapses     Morphine Other (See Comments)     Headache    Pcn [Penicillins]     Fentanyl Itching       Social History     Socioeconomic History    Marital status:      Spouse name: None    Number of children: None    Years of education: None    Highest education level: None   Occupational History    None   Social Needs    Financial resource strain: None    Food insecurity:     Worry: None     Inability: None    Transportation needs:     Medical: None     Non-medical: None   Tobacco Use    Smoking status: Never Smoker    Smokeless tobacco: Never Used   Substance and Sexual Activity    Alcohol use: Yes     Comment: socially    Drug use: No    Sexual activity: Yes   Lifestyle    Physical activity:     Days per week: None     Minutes per session: None    Stress: None   Relationships    Social connections:     Talks on phone: None     Gets together: None     Attends Rastafari service: None     Active member of club or organization: None     Attends meetings of clubs or organizations: None     Relationship status: None    Intimate partner violence:     Fear of current or ex partner: None     Emotionally abused: None     Physically abused: None     Forced sexual activity: None   Other Topics Concern    None   Social History Narrative    None       Family History   Problem Relation Age of Onset    Rheumatologic Disease Mother         Connective tissue disease         Review of Systems  As follows except as previously noted in HPI:  Constitutional: Negative for chills, diaphoresis, fatigue, fever and unexpected weight change. Respiratory: Negative for cough, shortness of breath and wheezing. Cardiovascular: Negative for chest pain and palpitations. Neurological: Negative for dizziness, syncope, cephalgia. GI / : negative  Musculoskeletal: see HPI       Objective:   Physical Exam   Constitutional: Oriented to person, place, and time.  and appears well-developed and well-nourished. :   Head: Normocephalic and atraumatic. Eyes: EOM are normal.   Neck: Neck supple. Cardiovascular: Normal rate and regular rhythm. Pulmonary/Chest: Effort normal. No stridor. No respiratory distress, no wheezes. Abdominal:  No abnormal distension. Neurological: Alert and oriented to person, place, and time. Skin: Skin is warm and dry. Psychiatric: Normal mood and affect.  Behavior is normal. Thought content normal.    4/16/2019  4:21 PM

## 2019-07-12 ENCOUNTER — HOSPITAL ENCOUNTER (OUTPATIENT)
Age: 59
Discharge: HOME | End: 2019-07-12
Payer: MEDICARE

## 2019-07-12 LAB
CALCIUM SERPL-MCNC: 9.5 MG/DL (ref 8.6–10.2)
PARATHYROID HORMONE INTACT: 60 PG/ML (ref 15–65)

## 2019-07-12 PROCEDURE — 82310 ASSAY OF CALCIUM: CPT

## 2019-07-12 PROCEDURE — 83970 ASSAY OF PARATHORMONE: CPT

## 2019-07-12 PROCEDURE — 36415 COLL VENOUS BLD VENIPUNCTURE: CPT

## 2019-08-06 ENCOUNTER — TELEPHONE (OUTPATIENT)
Dept: ORTHOPEDIC SURGERY | Age: 59
End: 2019-08-06

## 2019-08-06 NOTE — TELEPHONE ENCOUNTER
Received message from Thor Blair that Pramod called while I was on vacation to request Synvisc One injections for her (Bilateral knees) and her mother Jerome Cantu. (Lt knee)  LM for Pramod to call be back to confirm insurance coverage prior to me getting pre-cert. Ok to proceed with obtaining pre-cert for both of them? Last Synvisc One injection for Pramod 12/20/2019 Bilateral knees. Last Synvisc injection Lt knee for Coy Ramírez 3/29/2017.

## 2019-08-21 DIAGNOSIS — K91.2 MALNUTRITION FOLLOWING GASTROINTESTINAL SURGERY: ICD-10-CM

## 2019-08-27 ENCOUNTER — OFFICE VISIT (OUTPATIENT)
Dept: ORTHOPEDIC SURGERY | Age: 59
End: 2019-08-27
Payer: MEDICARE

## 2019-08-27 VITALS
DIASTOLIC BLOOD PRESSURE: 77 MMHG | HEIGHT: 66 IN | HEART RATE: 94 BPM | WEIGHT: 156 LBS | BODY MASS INDEX: 25.07 KG/M2 | SYSTOLIC BLOOD PRESSURE: 128 MMHG | TEMPERATURE: 98.7 F

## 2019-08-27 DIAGNOSIS — M17.0 PRIMARY OSTEOARTHRITIS OF BOTH KNEES: Primary | ICD-10-CM

## 2019-08-27 DIAGNOSIS — G56.01 CARPAL TUNNEL SYNDROME OF RIGHT WRIST: ICD-10-CM

## 2019-08-27 DIAGNOSIS — M18.0 PRIMARY OSTEOARTHRITIS OF BOTH FIRST CARPOMETACARPAL JOINTS: ICD-10-CM

## 2019-08-27 PROCEDURE — 20600 DRAIN/INJ JOINT/BURSA W/O US: CPT | Performed by: ORTHOPAEDIC SURGERY

## 2019-08-27 PROCEDURE — 20610 DRAIN/INJ JOINT/BURSA W/O US: CPT | Performed by: ORTHOPAEDIC SURGERY

## 2019-08-27 PROCEDURE — 99214 OFFICE O/P EST MOD 30 MIN: CPT | Performed by: ORTHOPAEDIC SURGERY

## 2019-08-27 RX ORDER — LIDOCAINE HYDROCHLORIDE 10 MG/ML
0.5 INJECTION, SOLUTION INFILTRATION; PERINEURAL ONCE
Status: COMPLETED | OUTPATIENT
Start: 2019-08-27 | End: 2019-08-27

## 2019-08-27 RX ORDER — TRIAMCINOLONE ACETONIDE 40 MG/ML
80 INJECTION, SUSPENSION INTRA-ARTICULAR; INTRAMUSCULAR ONCE
Status: COMPLETED | OUTPATIENT
Start: 2019-08-27 | End: 2019-08-27

## 2019-08-27 RX ORDER — LIDOCAINE HYDROCHLORIDE 10 MG/ML
3 INJECTION, SOLUTION INFILTRATION; PERINEURAL ONCE
Status: COMPLETED | OUTPATIENT
Start: 2019-08-27 | End: 2019-08-27

## 2019-08-27 RX ORDER — TRIAMCINOLONE ACETONIDE 40 MG/ML
20 INJECTION, SUSPENSION INTRA-ARTICULAR; INTRAMUSCULAR ONCE
Status: COMPLETED | OUTPATIENT
Start: 2019-08-27 | End: 2019-08-27

## 2019-08-27 RX ADMIN — TRIAMCINOLONE ACETONIDE 20 MG: 40 INJECTION, SUSPENSION INTRA-ARTICULAR; INTRAMUSCULAR at 11:34

## 2019-08-27 RX ADMIN — LIDOCAINE HYDROCHLORIDE 3 ML: 10 INJECTION, SOLUTION INFILTRATION; PERINEURAL at 11:34

## 2019-08-27 RX ADMIN — TRIAMCINOLONE ACETONIDE 80 MG: 40 INJECTION, SUSPENSION INTRA-ARTICULAR; INTRAMUSCULAR at 11:35

## 2019-08-27 RX ADMIN — LIDOCAINE HYDROCHLORIDE 0.5 ML: 10 INJECTION, SOLUTION INFILTRATION; PERINEURAL at 11:33

## 2019-08-30 ENCOUNTER — HOSPITAL ENCOUNTER (OUTPATIENT)
Age: 59
Discharge: HOME | End: 2019-08-30
Payer: MEDICARE

## 2019-08-30 LAB
FERRITIN: 40 NG/ML
FOLATE: >20 NG/ML (ref 4.8–24.2)
PREALBUMIN: 26 MG/DL (ref 20–40)

## 2019-08-30 PROCEDURE — 82728 ASSAY OF FERRITIN: CPT

## 2019-08-30 PROCEDURE — 36415 COLL VENOUS BLD VENIPUNCTURE: CPT

## 2019-08-30 PROCEDURE — 84630 ASSAY OF ZINC: CPT

## 2019-08-30 PROCEDURE — 84134 ASSAY OF PREALBUMIN: CPT

## 2019-08-30 PROCEDURE — 84425 ASSAY OF VITAMIN B-1: CPT

## 2019-08-30 PROCEDURE — 82746 ASSAY OF FOLIC ACID SERUM: CPT

## 2019-09-03 LAB — ZINC: 82.3 UG/DL (ref 60–120)

## 2019-09-04 LAB — VITAMIN B1 WHOLE BLOOD: 260 NMOL/L (ref 70–180)

## 2019-09-05 ENCOUNTER — EVALUATION (OUTPATIENT)
Dept: OCCUPATIONAL THERAPY | Age: 59
End: 2019-09-05
Payer: MEDICARE

## 2019-09-05 DIAGNOSIS — M18.11 PRIMARY OSTEOARTHRITIS OF FIRST CARPOMETACARPAL JOINT OF RIGHT HAND: Primary | ICD-10-CM

## 2019-09-05 PROCEDURE — 97110 THERAPEUTIC EXERCISES: CPT | Performed by: OCCUPATIONAL THERAPIST

## 2019-09-05 PROCEDURE — 97760 ORTHOTIC MGMT&TRAING 1ST ENC: CPT | Performed by: OCCUPATIONAL THERAPIST

## 2019-09-05 NOTE — PROGRESS NOTES
OCCUPATIONAL THERAPY EVALUATION/Splint Application Only   Phone: [phone]   Fax: [phone]     Date:  2019      Patient Name:  Chichi Pelaez    :  1960    Restrictions/Precautions:  None noted. Diagnosis:  CMC OA R thumb. RSD -  R UE long standing duration       Treatment Diagnosis:  same  Insurance/Certification information:  [employer]ET[employer] Medicare  Referring Physician:  Dr. Isaiah Solo  Date of Surgery/Injury: onset a few years ago  Plan of care signed (Y/N):  N  Visit# / total visits:     Past Medical History:  See medical chart  Past Surgical History: See medical chart. Reason for Referral: Pt has a long standing H/O OA in her [facility] joint ofher YESI richardson ( about 5 years) and she has [facility] custom braces she has been using for years. The OA in her R hand [address] [place] [address] has been getting worse the last year and now her R hand hurst worse. SHe presents today to have a custom CMC brace fabricated for her R hand. Splint Fabricated/Provided: Hand based CMC splint holding her thumb IP and MCM jt immobilized and allowing wrist and thumb IP motion. Education Provided: Patient was provided with verbal education/handout regarding splint care, wear, precautions, and hygiene    Splint Care: Splint can be washed with mild soap and cool water. Splint needs to air dry. Avoid leaving splint in or near a source of heat such as a hot car or a space heater. Use nail polish remover to remove stains on splint. Use Purell will decrease any odor that may develop. Splint Precuations: Patient was instructed to remove splint and contact therapist if the following occur:   1. Redness that lasts longer that 15-20 mins   2. Increased swelling   3. Increased pain   4. Pressure Sores    Wear Schedule: PT to wear as needed during functional tasks or at night as needed.      Assessment of current deficits   Functional mobility []  ADLs [] Strength [x]  Cognition []  Functional transfers  [] IADLs [] Safety Awareness []

## 2019-10-21 DIAGNOSIS — K91.2 POSTSURGICAL NONABSORPTION: Primary | ICD-10-CM

## 2019-10-24 ENCOUNTER — OFFICE VISIT (OUTPATIENT)
Dept: BARIATRICS/WEIGHT MGMT | Age: 59
End: 2019-10-24
Payer: MEDICARE

## 2019-10-24 VITALS
HEIGHT: 65 IN | WEIGHT: 154 LBS | TEMPERATURE: 97.4 F | BODY MASS INDEX: 25.66 KG/M2 | HEART RATE: 82 BPM | DIASTOLIC BLOOD PRESSURE: 74 MMHG | SYSTOLIC BLOOD PRESSURE: 123 MMHG | RESPIRATION RATE: 20 BRPM

## 2019-10-24 DIAGNOSIS — K91.2 MALNUTRITION FOLLOWING GASTROINTESTINAL SURGERY: Primary | ICD-10-CM

## 2019-10-24 PROCEDURE — 99212 OFFICE O/P EST SF 10 MIN: CPT

## 2019-10-24 PROCEDURE — 99213 OFFICE O/P EST LOW 20 MIN: CPT | Performed by: SURGERY

## 2019-10-24 RX ORDER — ZOLPIDEM TARTRATE 5 MG/1
5 TABLET ORAL NIGHTLY
Refills: 0 | COMMUNITY
Start: 2019-10-11

## 2019-10-24 RX ORDER — BACLOFEN 10 MG/1
10 TABLET ORAL 3 TIMES DAILY
Refills: 2 | COMMUNITY
Start: 2019-10-01 | End: 2020-10-22 | Stop reason: SDUPTHER

## 2020-03-24 ENCOUNTER — EVALUATION (OUTPATIENT)
Dept: OCCUPATIONAL THERAPY | Age: 60
End: 2020-03-24
Payer: MEDICARE

## 2020-03-24 PROCEDURE — 97110 THERAPEUTIC EXERCISES: CPT | Performed by: OCCUPATIONAL THERAPIST

## 2020-03-24 PROCEDURE — 97166 OT EVAL MOD COMPLEX 45 MIN: CPT | Performed by: OCCUPATIONAL THERAPIST

## 2020-03-24 NOTE — PROGRESS NOTES
[] Neuromuscular Re-education [x] Splinting         [] Desensitization          [x] Therapeutic Activity       [x] Pain Management with/without modalities PRN                 [] Manual Therapy/Fascial release                            [x] Tendon Glides        []Joint Protection/Training  []Ergonomics                             [] Joint Mobilization        [] Adaptive Equipment Assessment/Training                             [x] Manual Edema Mobilization    [] Energy Conservation/Work Simplification  [x] GM/FM Coordination       [] Safety retraining/education per  individual diagnosis/goals  [x] Scar Management        [] ADL/IADL re-training       Patient Specific Goal: To use her R hand as her dominant again. GOALS (Long term same as Short term):  1. ) Patient will demonstrate good understanding of home program (exercises/activities/diagnosis/prognosis/goals) with good accuracy. 2. ) Patient will demonstrate increased active/passive range of motion of their Right wrist and thumb to [facility] for ADL/IADL completion. 3. ) Patient will demonstrate increased /pinch strength of at least 10 / 5 pinch pounds of their Right hand. 4. ) Patient to report decreased pain in their affected Right  upper extremity from 5/10 to 2/10 or less with resistive functional use. 5. ) Patient to report 100% compliance with their splint wear, care, and precautions if needed. 6. ) Patient will be knowledgeable of edema control techniques as evident with decreases from moderate to none. 7. ) Patient will demonstrate a non-tender/non-adherent scar. 8. ) Patient will report ADL functions same as prior to diagnosis of R CMC interpositional.  9. ) Patient will decrease QuickDASH score by 25% for increased participation in daily functional activities. Patient.  Education:  [x] Plans/Goals, Risks/Benefits discussed  [x] Home exercise program  Method of Education: [x] Verbal  [x] Demo  [x]

## 2020-03-26 ENCOUNTER — TREATMENT (OUTPATIENT)
Dept: OCCUPATIONAL THERAPY | Age: 60
End: 2020-03-26
Payer: MEDICARE

## 2020-03-26 PROCEDURE — 97110 THERAPEUTIC EXERCISES: CPT | Performed by: OCCUPATIONAL THERAPIST

## 2020-03-26 PROCEDURE — 97530 THERAPEUTIC ACTIVITIES: CPT | Performed by: OCCUPATIONAL THERAPIST

## 2020-03-26 PROCEDURE — 97140 MANUAL THERAPY 1/> REGIONS: CPT | Performed by: OCCUPATIONAL THERAPIST

## 2020-03-26 NOTE — PROGRESS NOTES
OCCUPATIONAL THERAPY PROGRESS NOTE    Date:  3/26/2020                         Initial Evaluation Date: 3/24/2020    Patient Name:  Doris Ricardo    :  1960    Restrictions/Precautions:  None noted. Diagnosis:  CMC OA R thumb. RSD -  R UE long standing duration                                                            Treatment Diagnosis:  same  Insurance/Certification information:  AET[employer] Medicare  Referring Physician:  Dr. Collins Harris  Date of Surgery/Injury: onset a few years ago  Plan of care signed (Y/N):  N  Visit# / total visits:        Pain Level:  Entering clinic 2/10., with exercises 1-3/10. Subjective: \"I didn't wear this splint to much yesterday - it was a little sore but it felt good not to have the pressure on the scar. \"     Objective:  Updated POC to be completed by 2020. INTERVENTION: COMPLETED: SPECIFICS/COMMENTS:   Modality:     MHP x 5 min for soft tissue warm-up        AROM:     Wrist all planes -R X 10 rep;s ea    Thumb blocked all planes X 10 rep';s     pom pom balls X  10 rep's  - open and closing fingers and thumb , then opposition to all fingers - 15 reps'    Wrist er ciser X  5  Min. AAROM:               PROM/Stretching:     Gentle R wrist all planes x    Gentle R thumb all planes x         Scar Mass/Edema Control:     Scar and retrograde x R thumb and all scar areas             Strengthening:               Other:     Splint  x Fitted with Black neoprene thumb splint while weaning off splint - lg. Wear as needed   Thumb spica x Weaning from. Assessment/Comments: Pt is making Good progress toward stated plan of care.  Pt showing ^'ed mobility  In her thumb and wrist.  Slight tightness in wrist ext and ^ 'ed pain with thumb MP flexion.     -Rehab Potential: Good  -Requires OT Follow Up: Yes  Time In:  2:00 pm            Time Out: 3:00 pm             Visit #: 2    Treatment Charges: Mins Units   Modalities     Ther Exercise 15 1   Manual Therapy 25

## 2020-03-31 ENCOUNTER — TREATMENT (OUTPATIENT)
Dept: OCCUPATIONAL THERAPY | Age: 60
End: 2020-03-31
Payer: MEDICARE

## 2020-03-31 PROCEDURE — 97140 MANUAL THERAPY 1/> REGIONS: CPT | Performed by: OCCUPATIONAL THERAPIST

## 2020-03-31 PROCEDURE — 97530 THERAPEUTIC ACTIVITIES: CPT | Performed by: OCCUPATIONAL THERAPIST

## 2020-03-31 PROCEDURE — 97110 THERAPEUTIC EXERCISES: CPT | Performed by: OCCUPATIONAL THERAPIST

## 2020-03-31 NOTE — PROGRESS NOTES
task.      -Rehab Potential: Good  -Requires OT Follow Up: Yes  Time In:  2:00 pm            Time Out: 3:00 pm             Visit #: 3    Treatment Charges: Mins Units   Modalities     Ther Exercise 15 1   Manual Therapy 25 2   Thera Activities 15 1   ADL/Home Mgt      Neuro Re-education     Gait Training     Group Therapy     Non-Billable Service Time- MHP 5 0   Other     Total Time/Units 60 4       -Response to Treatment: Pt pleased with her thumb mobility and decreased pain in the RSN area. Therapist reminded her not to over do using her hand - and to listen to her pain. Goals: Goals for pt can be see on initial eval occurring on 3/24/2020    Plan:   [x]  Continues Plan of care: Treatment covered based on POC and graduated to patient's progress. Pt education continues at each visit to obtain maximum benefits from skilled OT intervention.   []  Alter Plan of care:   []  Discharge:      Jairo Ivan OT/L, CHT

## 2020-04-02 ENCOUNTER — TREATMENT (OUTPATIENT)
Dept: OCCUPATIONAL THERAPY | Age: 60
End: 2020-04-02
Payer: MEDICARE

## 2020-04-02 PROCEDURE — 97530 THERAPEUTIC ACTIVITIES: CPT | Performed by: OCCUPATIONAL THERAPIST

## 2020-04-02 PROCEDURE — 97140 MANUAL THERAPY 1/> REGIONS: CPT | Performed by: OCCUPATIONAL THERAPIST

## 2020-04-02 PROCEDURE — 97110 THERAPEUTIC EXERCISES: CPT | Performed by: OCCUPATIONAL THERAPIST

## 2020-04-02 NOTE — PROGRESS NOTES
plan of care. Pt showing ^'ed mobility  In her thumb and wrist. Pt tolerated active exercises well - slight ^ in pain in her wrist but decreased when finished with the task. Pt was told to try ice is her pain was ^'ed after an activity. Right Wrist  AROM:                           IE       4/2/2020  Wrist Flexion 0-80:                              52*          63*  Wrist Extension 0-70:                         45*           49*  Wrist Radial Deviation 0-20:               13*          17*  Wrist Ulnar Deviation 0-45:                 35*           38*     Right Thumb A ROM  Thumb MCP 0-50:                              28*             37*  Thumb IP 0-80:                                   35*             45*  Thumb Radial ABduction 0-55:          46*              63*  Thumb Palmar ABduction 0-45:         48*              62*  Thumb Opposition:                             Full to tip    Edema Description/Circumferential Measurements:              Pt 's edema in her R wrist decreased from moderate to slight +  and slight to none  in her thumb. CirCmu. MEase   Wrist   R  - 15.3 cm  - 15.1 cm   L 14.1 cm     Dynamometer (setting 2): measured today                             Left:  51#                                                Right:   22#                            Pinch Meter:              Lateral: Left= 8.5 #,   Right= 2#              Palmar 3 point: Left= 11#,     Right= 2.5#    -Rehab Potential: Good  -Requires OT Follow Up: Yes  Time In:  2:00 pm            Time Out: 3:00 pm             Visit #: 4    Treatment Charges: Mins Units   Modalities     Ther Exercise 15 1   Manual Therapy 25 2   Thera Activities 15 1   ADL/Home Mgt      Neuro Re-education     Gait Training     Group Therapy     Non-Billable Service Time- [facility] 5 0   Other     Total Time/Units 60 4       -Response to Treatment: Pt pleased with her thumb mobility and decreased pain in the RSN area.   Therapist reminded her not to over do using her hand - and to listen to her pain. Goals: Goals for pt can be see on initial eval occurring on 3/24/2020 and on 4/2/2020    GOALS (Long term same as Short term):  1. ) Patient will demonstrate good understanding of home program (exercises/activities/diagnosis/prognosis/goals) with good accuracy. Goal in progress. 2. ) Patient will demonstrate increased active/passive range of motion of their Right wrist and thumb to [facility] for ADL/IADL completion. Goal making good gains. 3. ) Patient will demonstrate increased /pinch strength of at least 10 / 5 pinch pounds of their Right hand. Goal to be initiated next week if tolerated. 4. ) Patient to report decreased pain in their affected Right  upper extremity from 5/10 to 2/10 or less with resistive functional use. Goal making good gains. 5. ) Patient to report 100% compliance with their splint wear, care, and precautions if needed. Goal making gains. Weaning off of. Fitted with black neoprene splint. 6. ) Patient will be knowledgeable of edema control techniques as evident with decreases from moderate to none. Goal making good gains. 7. ) Patient will demonstrate a non-tender/non-adherent scar. Goal making good gains. 8. ) Patient will report ADL functions same as prior to diagnosis of R CMC interpositional. Goal making slow gains. Using for light tasks at this time. 9. ) Patient will decrease QuickDASH score by 25% for increased participation in daily functional activities. Goal making slow gains. Plan:   [x]  Continues Plan of care: Continue to treat 1- 2 x's a week for 2 weeks ( as we stay open now even though Covid 19) . Treatment covered based on POC and graduated to patient's progress. Pt education continues at each visit to obtain maximum benefits from skilled OT intervention.   []  Alter Plan of care:   []  Discharge:      Marko Go OT/L, CHT   OT 82

## 2020-04-07 ENCOUNTER — TREATMENT (OUTPATIENT)
Dept: OCCUPATIONAL THERAPY | Age: 60
End: 2020-04-07
Payer: MEDICARE

## 2020-04-07 PROCEDURE — 97110 THERAPEUTIC EXERCISES: CPT | Performed by: OCCUPATIONAL THERAPIST

## 2020-04-07 PROCEDURE — 97140 MANUAL THERAPY 1/> REGIONS: CPT | Performed by: OCCUPATIONAL THERAPIST

## 2020-04-07 NOTE — PROGRESS NOTES
OCCUPATIONAL THERAPY PROGRESS NOTE    Date:  2020                         Initial Evaluation Date: 3/24/2020    Patient Name:  George Rae    :  1960    Restrictions/Precautions: Follow CMC interpositional protocol, low fall risk  Diagnosis:  R CMC interpositional arthroplasty                                                          Insurance/Certification information:  [employer] Medicare. Referring Practitioner:  Dr. Washington Cabrera  Date of Surgery/Injury: sx 2020   ( 6  weeks post op). Plan of care signed (Y/N):  N  Visit# / total visits:     Pain Level:  Entering clinic 4/10 in thumb and wrist., with exercises 1-3/10. Subjective: \"I am getting more ROM and numbness is getting better\"     Objective:  Updated POC to be completed by 2020. INTERVENTION: COMPLETED: SPECIFICS/COMMENTS:   Modality:     MHP x 10 min for soft tissue warm-up and at end of session to assist with pain management. AROM:     Wrist all planes -R X 10 rep;s ea    Thumb blocked all planes X 10 rep';s     Chinese balls  X 3 min. The easy direction only - counterclockwise   pom pom balls  10 rep's  - open and closing fingers and thumb , then opposition to all fingers - 15 reps'    marbles  x10 mins  with opposition to alternating digits, in hand manipulation x20 to use thumb to bring to finger tip and placing marbles in container. Wrist er ciser   3  Min. Less time today 2* to ^'ed pain next time. pegbd with 1# pegs  Using 3 pt  pinch   Paper clip chain x Completed removing large paper clips from a chain with mild pain reported using RUE. AAROM:               PROM/Stretching:      R wrist all planes x    R thumb all planes x         Scar Mass/Edema Control:     Scar and retrograde x R thumb and all scar areas             Strengthening:               Other:     HEP x All reviewed. Therapist did give the pt easy putty to begin using next week ( if we are closed).  She will bring in next week

## 2020-04-08 ENCOUNTER — TREATMENT (OUTPATIENT)
Dept: OCCUPATIONAL THERAPY | Age: 60
End: 2020-04-08
Payer: MEDICARE

## 2020-04-08 PROCEDURE — 97110 THERAPEUTIC EXERCISES: CPT | Performed by: OCCUPATIONAL THERAPIST

## 2020-04-08 PROCEDURE — 97140 MANUAL THERAPY 1/> REGIONS: CPT | Performed by: OCCUPATIONAL THERAPIST

## 2020-04-08 NOTE — PROGRESS NOTES
with these two ex's. Instructed pt to add to HEP. Added hands on light resistive ex's forearm, wrist digits/thumb, all planes x 15 reps x    Other:     HEP x All reviewed. Splint  x  Black neoprene thumb splint while weaning off splint - medium. Wear as needed   Thumb spica x Weaned from     Assessment/Comments: Pt is making Good progress toward stated plan of care. Pt reports to therapy with very minimal discomfort level. Pt tolerated thumb motion, all planes/joints very well. Therapist added hands on resistive ex's forearm thru thumb which pt tolerated very well. Therapist initiated soft yellow putty ex's- in hand manipulation & rolling over base of thumb which pt tolerated very well. Instructed pt on how to complete at home as noted above. Will progress as tolerated. Therapist instructed pt to hold back on sewing tasks as well as other pinching tasks at this time secondary to them causing her pain. Instructed pt she can try them at a later date when she is stronger.    -Rehab Potential: Good  -Requires OT Follow Up: Yes  Time In:  2:00 pm            Time Out: 3:00 pm             Visit #: 7    Treatment Charges: Mins Units   Modalities     Ther Exercise 30 2   Manual Therapy 30 2   Thera Activities     ADL/Home Mgt      Neuro Re-education     Gait Training     Group Therapy     Non-Billable Service Time- [facility]     Other     Total Time/Units 60 4       -Response to Treatment: Pt is happy to begin theraputty light use. Pt wishes to get some strength back in her hand for sewing tasks. Goals: Goals for pt can be see on initial eval occurring on 3/24/2020 and on 4/2/2020    Plan:   [x]  Continues Plan of care: Continue to treat 1- 2 x's a week for 2 weeks ( as we stay open now even though Covid 19) . Treatment covered based on POC and graduated to patient's progress. Pt education continues at each visit to obtain maximum benefits from skilled OT intervention.   []  Alter Plan of care:   []  Discharge:      Mili Schafer

## 2020-04-14 ENCOUNTER — TREATMENT (OUTPATIENT)
Dept: OCCUPATIONAL THERAPY | Age: 60
End: 2020-04-14
Payer: MEDICARE

## 2020-04-14 PROCEDURE — 97140 MANUAL THERAPY 1/> REGIONS: CPT | Performed by: OCCUPATIONAL THERAPIST

## 2020-04-14 PROCEDURE — 97110 THERAPEUTIC EXERCISES: CPT | Performed by: OCCUPATIONAL THERAPIST

## 2020-04-16 ENCOUNTER — TREATMENT (OUTPATIENT)
Dept: OCCUPATIONAL THERAPY | Age: 60
End: 2020-04-16
Payer: MEDICARE

## 2020-04-16 PROCEDURE — 97110 THERAPEUTIC EXERCISES: CPT | Performed by: OCCUPATIONAL THERAPIST

## 2020-04-16 PROCEDURE — 97140 MANUAL THERAPY 1/> REGIONS: CPT | Performed by: OCCUPATIONAL THERAPIST

## 2020-04-16 NOTE — PROGRESS NOTES
OCCUPATIONAL THERAPY PROGRESS NOTE    Date:  2020                         Initial Evaluation Date: 3/24/2020    Patient Name:  Rebecca Bass    :  1960    Restrictions/Precautions: Follow CMC interpositional protocol, low fall risk  Diagnosis:  R CMC interpositional arthroplasty                                                          Insurance/Certification information:  [facility] Medicare. Referring Practitioner:  Dr. Marcial Soto  Date of Surgery/Injury: sx 2020   ( 8  weeks post op). Plan of care signed (Y/N):  N  Visit# / total visits:     Pain Level:  Entering clinic 2/10 in thumb and wrist., with exercises -3/10. Subjective:  Pt reported \"I really like doing those resistance exercises with my thumb. I had so much atrophy in the palm here, it's so weak. \"     Objective:  Updated POC to be completed by 2020. INTERVENTION: COMPLETED: SPECIFICS/COMMENTS:   Modality:     MHP x 10 min for soft tissue warm-up start of session         AROM:     Wrist & forearm, all planes -R     Thumb, all planes x 20 reps each for AROM warm-up   Chinese balls   The easy direction only - counterclockwise   Towel scrunches x 12 reps each with focus of full flexion and extension of full fist and then thumb. Opposition w/ Beads x Completed pinching mini beads with alternating digits for opposition x 5 min.    pom pom balls  10 rep's  - open and closing fingers and thumb , then opposition to all fingers - 15 reps'         marbles   with opposition to alternating digits, in hand manipulation x20 to use thumb to bring to finger tip and placing marbles in container. Wrist er ciser   Less time today 2* to ^'ed pain next time. pegbd with 1# pegs  Using 3 pt  pinch   Paper clip chain  Completed removing large paper clips from a chain with mild pain reported using RUE.     AAROM:               PROM/Stretching:     R wrist & forearm, all planes x    R thumb all planes/joints x         Scar

## 2020-04-21 ENCOUNTER — TREATMENT (OUTPATIENT)
Dept: OCCUPATIONAL THERAPY | Age: 60
End: 2020-04-21
Payer: MEDICARE

## 2020-04-21 PROCEDURE — 97140 MANUAL THERAPY 1/> REGIONS: CPT | Performed by: OCCUPATIONAL THERAPIST

## 2020-04-21 PROCEDURE — 97110 THERAPEUTIC EXERCISES: CPT | Performed by: OCCUPATIONAL THERAPIST

## 2020-04-21 NOTE — PROGRESS NOTES
OCCUPATIONAL THERAPY PROGRESS / REASSESSMENT NOTE    Date:  2020                         Initial Evaluation Date: 3/24/2020    Patient Name:  Jennifer Blevins    :  1960    Restrictions/Precautions: Follow CMC interpositional protocol, low fall risk  Diagnosis:  R CMC interpositional arthroplasty                                                          Insurance/Certification information:  [facility] Medicare. Referring Practitioner:  Dr. Quincy Mai  Date of Surgery/Injury: sx 2020   (  9 1/2 weeks post op). Plan of care signed (Y/N):  N  Visit# / total visits: 10 / 12    Pain Level:  Entering clinic 2/10 in thumb and wrist., with exercises 1-3/10. Subjective:  Pt reported \" My thumb is feeling a little stronger now, not normal strong, but I can feel a difference. \"     Objective:  Updated POC completed today 20    INTERVENTION: COMPLETED: SPECIFICS/COMMENTS:   Modality:     MHP x 10 min for soft tissue warm-up start of session         AROM:     Wrist & forearm, all planes -R x    Thumb, all planes x 20 reps each for AROM warm-up   Chinese balls  home The easy direction only - counterclockwise   Towel scrunches  12 reps each with focus of full flexion and extension of full fist and then thumb. Opposition w/ Beads  Completed pinching mini beads with alternating digits for opposition x 5 min.    pom pom balls  10 rep's  - open and closing fingers and thumb , then opposition to all fingers - 15 reps'         marbles   with opposition to alternating digits, in hand manipulation x20 to use thumb to bring to finger tip and placing marbles in container. Wrist er ciser   Less time today 2* to ^'ed pain next time. pegbd with 1# pegs  Using 3 pt  pinch   Paper clip chain  Completed removing large paper clips from a chain with mild pain reported using RUE.     AAROM:               PROM/Stretching:     R wrist & forearm, all planes x    R thumb all planes/joints x Gentle joint mobs to

## 2020-04-28 ENCOUNTER — TREATMENT (OUTPATIENT)
Dept: OCCUPATIONAL THERAPY | Age: 60
End: 2020-04-28
Payer: MEDICARE

## 2020-04-28 PROCEDURE — 97140 MANUAL THERAPY 1/> REGIONS: CPT | Performed by: OCCUPATIONAL THERAPIST

## 2020-04-28 PROCEDURE — 97110 THERAPEUTIC EXERCISES: CPT | Performed by: OCCUPATIONAL THERAPIST

## 2020-04-28 NOTE — PROGRESS NOTES
Scar Mass/Edema Control:     Scar and retrograde massage x R thumb and all scar areas   Flexbar rolls x For pain in palm of hand and to break up scar tissue. Strengthening:     Soft yellow theraputty ex's x Squeeze, rolling thru base of thumb on tabletop, Individual tip pinches, thumb  adduction (duck quack), thumb opposition, and IP flexion & extension, hand composite extension . HEP also   Yellow Digi-Flex x 2x15 reps with focus on thumb strengthening. Red Flexbar x 2x15 of pronation bending, supination bending, and twisting. Resistance Thumb, all planes Exercises x 20 reps in all planes with resistance provided by therapist.   Hands on light resistive ex's forearm, wrist, digits, all planes   2 x 10 reps all    Other:     HEP x All reviewed. Splint  x  Black neoprene thumb splint while weaning off splint - medium. Wear as needed   Thumb spica x Weaned from     Assessment/Comments: Pt is making Good progress toward stated Plan of Care. Pt reports to therapy reporting decreased soreness and improving strength in thumb. Pt tolerated ^d reps & resistance used for strengthening ex's very well. Pt is reaching toward goals well. Preparing pt for d/c next visit. Dynamometer (setting 2): measured 4/02/20                         Left:  51#                                                Right:   22# reassessed today 4/21/20 ^d 32#     Pinch Meter:              Lateral: Left= 8.5 #,   Right= 2# reassessed 4/21 @ ^d 3#              FDEXQF 3 point: Left= 11#,     Right= 2.5#. [place] . reassessed 4/21 @ ^d 4#    ADLS: Pt reports improving use for taking bottle lids off and using utensils when eating. Pt has achiness with tip pinch use such as when sewing.     AROM: R thumb, WNL's - all planes with stiffness present    -Rehab Potential: Good  -Requires OT Follow Up: Yes  Time In:  1:10 pm            Time Out: 2:10 pm             Visit #: 11    Treatment Charges: Mins Units   Modalities     Ther Exercise 30 2   Manual Therapy 30 2   Thera Activities     ADL/Home Mgt      Neuro Re-education     Gait Training     Group Therapy     Non-Billable Service Time-      Other     Total Time/Units 60 4       -Response to Treatment: Pt is responding well to strengthening. Goals: Goals for pt can be see on initial eval occurring on 3/24/2020 and on 4/2/2020  1. ) Patient will demonstrate good understanding of home program (exercises/activities/diagnosis/prognosis/goals) with good accuracy. Goal in progress. 2. ) Patient will demonstrate increased active/passive range of motion of their Right wrist and thumb to [facility] for ADL/IADL completion. Goal Met  3. ) Patient will demonstrate increased /pinch strength of at least 10 / 5 pinch pounds of their Right hand. Goal Progressing Well  4. ) Patient to report decreased pain in their affected Right  upper extremity from 5/10 to 2/10 or less with resistive functional use. Goal making good gains. 5. ) Patient to report 100% compliance with their splint wear, care, and precautions if needed. Goal Met  6. ) Patient will be knowledgeable of edema control techniques as evident with decreases from moderate to none. Goal Met  7. ) Patient will demonstrate a non-tender/non-adherent scar.  Goal Met   8. ) Patient will report ADL functions same as prior to diagnosis of R CMC interpositional. Goal making good gains. 9. ) Patient will decrease QuickDASH score by 25% for increased participation in daily functional activities. Goal making good gains. Plan:   [x]  Continues Plan of care: Continue to treat 1- 2 x's a week for 2 weeks ( as we stay open now through Covid 19 crisis) . Treatment covered based on POC and graduated to patient's progress. Pt education continues at each visit to obtain maximum benefits from skilled OT intervention.   []  Alter Plan of care:   []  Discharge:      Jay VERDIN/YESI, [id number]

## 2020-05-05 ENCOUNTER — TREATMENT (OUTPATIENT)
Dept: OCCUPATIONAL THERAPY | Age: 60
End: 2020-05-05
Payer: MEDICARE

## 2020-05-05 PROCEDURE — 97140 MANUAL THERAPY 1/> REGIONS: CPT | Performed by: OCCUPATIONAL THERAPIST

## 2020-05-05 PROCEDURE — 97110 THERAPEUTIC EXERCISES: CPT | Performed by: OCCUPATIONAL THERAPIST

## 2020-05-05 NOTE — PROGRESS NOTES
OCCUPATIONAL THERAPY PROGRESS NOTE    DISCHARGE SUMMARY    Date:  2020                         Initial Evaluation Date: 3/24/2020    Patient Name:  Darline Valiente    :  1960    Restrictions/Precautions: Follow CMC interpositional protocol, low fall risk  Diagnosis:  R CMC interpositional arthroplasty                                                          Insurance/Certification information:  [facility] Medicare. Referring Practitioner:  Dr. Reese Mcdaniels  Date of Surgery/Injury: sx 2020   (  9 1/2 weeks post op). Plan of care signed (Y/N):  N  Visit# / total visits:     Pain Level:  Entering clinic 2/10 in thumb and wrist., with exercises -3/10. Subjective:  Pt reported \" I still feel pain in my thumb when I over use it but it goes away after a day. I am using my hand so much more now. .\"     Objective:  Updated POC completed today 20    INTERVENTION: COMPLETED: SPECIFICS/COMMENTS:   Modality:     MHP x 10 min for soft tissue warm-up start of session         AROM:     Wrist & forearm, all planes -R x    Thumb, all planes x 20 reps each for AROM warm-up   Chinese balls  home The easy direction only - counterclockwise   Towel scrunches  12 reps each with focus of full flexion and extension of full fist and then thumb. Opposition w/ Beads  Completed pinching mini beads with alternating digits for opposition x 5 min.    pom pom balls  10 rep's  - open and closing fingers and thumb , then opposition to all fingers - 15 reps'         marbles   with opposition to alternating digits, in hand manipulation x20 to use thumb to bring to finger tip and placing marbles in container. Wrist er ciser   Less time today 2* to ^'ed pain next time.     pegbd with 1# pegs  Using 3 pt  pinch   Paper clip chain home    AAROM:               PROM/Stretching:     R wrist & forearm, all planes x    R thumb all planes/joints x  joint mobs to thumb also        Scar Mass/Edema Control:     Scar and 51#                                                                                                   Right:   22#           35#     Pinch Meter:              Lateral: Left= 8.5 #,   Right= 2# - 4#              QCHAIC 3 point: Left= 11#,     Right= 2.5#. .- 4.5#    ADLS: Pt reports improving use for taking bottle lids off and using utensils when eating. Pt has achiness with tip pinch use such as when sewing. Pt is using her L for all ADL tasks and beginning to use for moderate tasks. AROM: R thumb, WNL's - all planes with stiffness present    Quick DASH   - 20% disability    -Rehab Potential: Good  -Requires OT Follow Up: Yes  Time In:  1:10 pm            Time Out: 2:10 pm             Visit #: 12    Treatment Charges: Mins Units   Modalities     Ther Exercise 30 2   Manual Therapy 30 2   Thera Activities     ADL/Home Mgt      Neuro Re-education     Gait Training     Group Therapy     Non-Billable Service Time-      Other     Total Time/Units 60 4       -Response to Treatment: Pt is responding well to strengthening. Pt feel ready for D/C      Goals: Goals for pt can be see on initial eval occurring on 3/24/2020 and on 4/2/2020  1. ) Patient will demonstrate good understanding of home program (exercises/activities/diagnosis/prognosis/goals) with good accuracy. Goal Met.   2. ) Patient will demonstrate increased active/passive range of motion of their Right wrist and thumb to WFLs for ADL/IADL completion. Goal Met  3. ) Patient will demonstrate increased /pinch strength of at least 10 / 5 pinch pounds of their Right hand. Goal Met  4. ) Patient to report decreased pain in their affected Right  upper extremity from 5/10 to 2/10 or less with resistive functional use. Goal met. 5. ) Patient to report 100% compliance with their splint wear, care, and precautions if needed. Goal Met  6. ) Patient will be knowledgeable of edema control techniques as evident with decreases from moderate to none.  Goal Met  7. )

## 2020-06-23 ENCOUNTER — HOSPITAL ENCOUNTER (OUTPATIENT)
Dept: CT IMAGING | Age: 60
Discharge: HOME | End: 2020-06-25
Payer: MEDICARE

## 2020-06-23 PROCEDURE — 70486 CT MAXILLOFACIAL W/O DYE: CPT

## 2020-06-25 ENCOUNTER — TELEPHONE (OUTPATIENT)
Dept: ENT CLINIC | Age: 60
End: 2020-06-25

## 2020-07-29 ENCOUNTER — EVALUATION (OUTPATIENT)
Dept: OCCUPATIONAL THERAPY | Age: 60
End: 2020-07-29
Payer: MEDICARE

## 2020-07-29 PROCEDURE — 97110 THERAPEUTIC EXERCISES: CPT | Performed by: OCCUPATIONAL THERAPIST

## 2020-07-29 PROCEDURE — 97760 ORTHOTIC MGMT&TRAING 1ST ENC: CPT | Performed by: OCCUPATIONAL THERAPIST

## 2020-07-29 PROCEDURE — 97140 MANUAL THERAPY 1/> REGIONS: CPT | Performed by: OCCUPATIONAL THERAPIST

## 2020-07-29 PROCEDURE — 97165 OT EVAL LOW COMPLEX 30 MIN: CPT | Performed by: OCCUPATIONAL THERAPIST

## 2020-07-29 NOTE — PROGRESS NOTES
OCCUPATIONAL THERAPY INITIAL EVALUATION    [address]  [address] [address]  Dept: [phone]   [address] Fax: [phone]    Date:  2020  Initial Evaluation Date: 2020   Evaluating Therapist: Keesha Dolan    Patient Name:  Stormy Osman    :  1960    Restrictions/Precautions:  OT Eval & treat, low fall risk  Diagnosis: L CMC Arthroplasty       Insurance/Certification information: [employer] Medicare  Referring Practitioner:  Dr. 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    CRPS (complex regional pain syndrome type I)     Heart palpitations     History of constipation     History of DVT (deep vein thrombosis)     History of phlebitis     History of shortness of breath     Hx of blood clots     Hyperlipidemia     Hypothyroidism     Lower leg DVT (deep venous thrombosis) (HCC)     Migraines     RSD (reflex sympathetic dystrophy)     Left buttocks and Spine     Varicose veins      Past Surgical History:   Past Surgical History:   Procedure Laterality Date    ABDOMEN SURGERY      COLONOSCOPY  2017    ENDOSCOPY, COLON, DIAGNOSTIC      HERNIA REPAIR  2004    Dr. Ash Taylor - Repair of Incisional Hernia with Composix Dual Mesh   [address]    Dorsal column stimulator [facility], Battery change stimulator 2016    NE EGD TRANSORAL BIOPSY SINGLE/MULTIPLE N/A 2018    EGD ESOPHAGOGASTRODUODENOSCOPY performed by Denise Holder MD at [address], REPAIR PARAESOPHAGEAL HERNIA, INCL FUNDOPLASTY W/O MESH N/A her  are retired. Hobbies include selling items on [address], and they have been helping her parents move into a new assisted living apartment. She cannot help them at this time 2* them brooke on lock down from COVID 19. ADL STATUS:                          Ind                   Mod I               Min A               Mod A              Max A              Dep                        N/A  Feeding:          []? [x]? []?                    []?                    []?                    []?                        []?  Grooming:       []? [x]? []?                    []?                    []?                    []?                        []?  Bathing:           []? [x]? []?                    []?                    []?                    []?                        []?  UE Dressing:   []? [x]? []?                    []?                    []?                    []?                        []?  LE Dressing:   []? [x]? []?                    []?                    []?                    []?                        []?  Toileting:         [x]? []?                    []?                    []?                    []?                    []?                        []?  Transfer:          [x]? []?                    []?                    []?                    []?                    []?                        []?  Comments. Pt doing most tasks with her R extremity.       Pain Level: 4 on scale of 1-10, aching, sharp, shooting and with paresthesia    UE Assessment:    L  Hand ROM  Edema AROM [x]         PROM[] IE      THUMB  6.8 cm MP Flexion/Extension 0-50*/ 14-23* H TBA      7.2 cm IP Flexion/Extension 0-80*/ 23-30* H 31*       Radial Abduction 53-71* TBA       Palmar Abduction 50-71* TBA Comment: Hand Dominance is R    Dynamometer (setting 2):     Left: TBA      Right: TBA    Pinch Meter:   Lateral: Left= TBA,Right= TBA    Palmar 3 point: Left= TBA, Right= TBA  9 Hole Peg Test:   Left: TBA   Right: TBA    QuickDASH Score: 34.1% disability reported     Intervention: Fabricated a thumb spica splint for patient with instructions for 24/7 wear except for hygiene after she is done showering. Pt educated to move IP freely and avoid activities with weight using L hand. Pt educated on edema management and pain management techniques for home. Wound care completed to area as well. Pt did require another visit this day to modify splint due to decreased edema noted.  ( Pt reports using H-wave device frequently which is helping with edema)     Assessment of current deficits   Functional mobility []  ADLs [] Strength [x]  Cognition []  Functional transfers  [] IADLs [] Safety Awareness [x]  Endurance [x]  Fine Motor Coordination [x] Balance [] Vision/perception [] Sensation [x]   Gross Motor Coordination [x] ROM [x]     Eval Complexity: low  Profile and History- Chart Reviewed   Assessment of Occupational Performance and Identification of Deficits- 7   Clinical Decision Making- no additional modifications required    Rehab Potential:                                 [x] Good  [] Fair  [] Poor        Suggested Professional Referral:       [x] No  [] Yes:  Barriers to Goal Achievement[de-identified]          [x] No  [] Yes:  Domestic Concerns:                           [x] No  [] Yes:     Goal Formulation: Patient  Time In: 11:00 am            Time Out: 12:00 pm                      Timed Code Treatment Minutes: 40 minutes        PLAN      Treatment to include:   [x] Instruction in HEP                   Modalities:  [x] Therapeutic Exercise        [x] Ultrasound               [x] Electrical Stimulation/Attended  [x] PROM/Stretching                    [x] Fluidotherapy          [x]  Paraffin                   [x] AAROM  [x] AROM                 [x] Iontophoresis: 4 mg/mL; Dexamethasone Sodium                                        [] Neuromuscular Re-education [x] Splinting         [x] Desensitization          [x] Therapeutic Activity       [x] Pain Management with/without modalities PRN                 [x] Manual Therapy/Fascial release                            [x] Tendon Glides        [x]Joint Protection/Training  []Ergonomics                             [x] Joint Mobilization        [] Adaptive Equipment Assessment/Training                             [x] Manual Edema Mobilization    [] Energy Conservation/Work Simplification  [x] GM/FM Coordination       [x] Safety retraining/education per  individual diagnosis/goals  [x] Scar Management        [x] ADL/IADL re-training       Patient Specific Goal: Pt would like full pain free use of LUE. GOALS (Long term same as Short term):  1) Patient will demonstrate good understanding of home program (exercises/activities/diagnosis/prognosis/goals) with good accuracy. 2) Patient will demonstrate increased active/passive range of motion of their LUE to [facility] for ADL/IADL completion. 3) Patient will demonstrate increased /pinch strength of at least 10 / 5 pinch pounds of their L hand. 4) Patient to report decreased pain in their affected L upper extremity from 7/10 to 2/10 or less with resistive functional use. 5) Increase in fine motor function as evidenced by decreased time to complete 9-hole peg test and/or MRMT test by at least 5-10 seconds. 6) Patient to report 100% compliance with their splint wear, care, and precautions if needed. 7) Patient to report a decrease in hypersensitivity from 80% to 20% or less in their LUE. 8) Patient to demonstrate decreased guarding of their affected extremity from 100% to 20% or less. 9) Patient will be knowledgeable of edema control techniques as evident with decreases from moderate to mild/none.    10) Patient will demonstrate a non-tender/non-adherent scar. 11) Patient will report ADL functions as Mod I/I using LUE. 12) Patient will demonstrate improved functional activity tolerance from fair to good for ADL/IADL completion. 13) Patient will decrease QuickDASH score by 25% for increased participation in daily functional activities. Patient. Education:  [x] Plans/Goals, Risks/Benefits discussed  [x] Home exercise program  Method of Education: [x] Verbal  [x] Demo  [] Written  Comprehension of Education:  [x] Verbalizes understanding. [x] Demonstrates understanding. [x] Needs Review. [x] Demonstrates/verbalizes understanding of HEP/Ed previously given. Patient understands diagnosis/prognosis and consents to treatment, plan and goals: [x] Yes    [] No      Electronically signed by: Jacob Thurston MS, OTR/L #[id number]      [id number] Certification / Comments      Frequency/Duration 1-2x / week for 12-18 visits. Certification period From: 07/29/2020  To: 10/21/2020     I have reviewed the Plan of Care established for skilled therapy services and certify that the services are required and that they will be provided while the patient is under my care. Physician's Comments/Revisions:                   Physicians's Printed Name:  Dr. Manny Saul                                   Physician's Signature:                                                               Date:       Please review Patient's OT evaluation and if you agree sign/date and fax back to us at our [facility] Fax: [phone].  Thank you for your referral!

## 2020-08-12 ENCOUNTER — TREATMENT (OUTPATIENT)
Dept: OCCUPATIONAL THERAPY | Age: 60
End: 2020-08-12
Payer: MEDICARE

## 2020-08-12 PROCEDURE — 97140 MANUAL THERAPY 1/> REGIONS: CPT | Performed by: OCCUPATIONAL THERAPIST

## 2020-08-12 PROCEDURE — 97110 THERAPEUTIC EXERCISES: CPT | Performed by: OCCUPATIONAL THERAPIST

## 2020-08-12 NOTE — PROGRESS NOTES
Thera Activities     ADL/Home Mgt      Neuro Re-education     Gait Training     Group Therapy     Non-Billable Service Time     Other     Total Time/Units 43 3       -Response to Treatment: Pt responded well to treatment  Goals: Goals for pt can be see on initial eval occurring on 07/29/2020    Plan:   [x]  Continues Plan of care: Treatment covered based on POC and graduated to patient's progress. Pt education continues at each visit to obtain maximum benefits from skilled OT intervention.   []  Alter Plan of care:   []  Discharge:      Deloris Flores [address], OTR/L #[id number]

## 2020-08-14 ENCOUNTER — TREATMENT (OUTPATIENT)
Dept: OCCUPATIONAL THERAPY | Age: 60
End: 2020-08-14
Payer: MEDICARE

## 2020-08-14 PROCEDURE — 97022 WHIRLPOOL THERAPY: CPT | Performed by: OCCUPATIONAL THERAPIST

## 2020-08-14 PROCEDURE — 97140 MANUAL THERAPY 1/> REGIONS: CPT | Performed by: OCCUPATIONAL THERAPIST

## 2020-08-14 PROCEDURE — 97110 THERAPEUTIC EXERCISES: CPT | Performed by: OCCUPATIONAL THERAPIST

## 2020-08-14 NOTE — PROGRESS NOTES
OCCUPATIONAL THERAPY PROGRESS NOTE    Date:  2020    Initial Evaluation Date: 2020                          Evaluating Therapist: Quincy Puckett     Patient Name:  Ophelia Garcia                :  1960     Restrictions/Precautions:  OT Eval & treat, low fall risk  Diagnosis: L [facility] Arthroplasty                                                           Insurance/Certification information: [employer] Medicare  Referring Practitioner:  Dr. Bianca Junior  Date of Surgery/Injury: 2020 ( 4 weeks on 2020)  Plan of care signed (Y/N): N  Visit# / total visits:     Pain Level: 1 on scale of 1-10, aching    Subjective: Pt States: \"This L hand has felt so much better then my R hand/thumb did. \"     Objective:  Updated POC to be completed by 10 th visit. INTERVENTION: COMPLETED: SPECIFICS/COMMENTS:   Modality:     MHP  To L thumb at start of session. 10 min   Fluiotherapy X 15 min. For soft tissue warm-up while moving her fingers intermittently. AROM:     MCP & IP blocking x Initiated 2 sets x10 each joint blocking at MCP & IP with good tolerance. Less pain  Today during blocked MP flex/ext. [place] scrutch   X- 3 min. Not using thumb. Tendon gliding x Williams well        AAROM:               PROM/Stretching:     Thumb IP and MP jt x Before doing AROM exercises. Scar Mass/Edema Control:     Retrograde massage x Completed to Thumb and forearm   Scar massage x All scar areas. williams well. Strengthening:               Other:     Splint adjustment x Splint was adjusted to more comfortable fit for the patient. Fitting well. HEP x Cont with digital tendon gliding, blocked thumb IP and MP flex/ext., scar massage       Assessment/Comments: Pt is making Fair progress toward stated plan of care. Pt tolerated session well today with no ^ in pain and less pain while doing blocked thumb MP flex/ext. Pt continues to show only slight edema and low level pain.   To progress next week as she will be 5 weeks post op at that time.     -Rehab Potential: Good  -Requires OT Follow Up: Yes  Time In:10:00 am            Time Out: 10:45             Visit #: 3    Treatment Charges: Mins Units   Modalities-fluioth 15 1   Ther Exercise 15 1   Manual Therapy 15 1   Thera Activities     ADL/Home Mgt      Neuro Re-education     Gait Training     Group Therapy     Non-Billable Service Time     Other     Total Time/Units 45 3       -Response to Treatment: Pt responded well to treatment with no ^ in pain. Goals: Goals for pt can be see on initial eval occurring on 07/29/2020    Plan:   [x]  Continues Plan of care: Treatment covered based on POC and graduated to patient's progress. Pt education continues at each visit to obtain maximum benefits from skilled OT intervention.   []  Alter Plan of care:   []  Discharge:      Johnathan Garcia OT/L, CHT

## 2020-08-21 ENCOUNTER — TREATMENT (OUTPATIENT)
Dept: OCCUPATIONAL THERAPY | Age: 60
End: 2020-08-21
Payer: MEDICARE

## 2020-08-21 PROCEDURE — 97140 MANUAL THERAPY 1/> REGIONS: CPT | Performed by: OCCUPATIONAL THERAPIST

## 2020-08-21 PROCEDURE — 97110 THERAPEUTIC EXERCISES: CPT | Performed by: OCCUPATIONAL THERAPIST

## 2020-08-21 NOTE — PROGRESS NOTES
OCCUPATIONAL THERAPY PROGRESS NOTE    Date:  2020    Initial Evaluation Date: 2020                          Evaluating Therapist: Viet Wallace     Patient Name:  Yasmany Khan                :  1960     Restrictions/Precautions:  OT Eval & treat, low fall risk  Diagnosis: L CMC Arthroplasty                                                           Insurance/Certification information: [facility]  Referring Practitioner:  Dr. Shubham Fischer  Date of Surgery/Injury: 2020 ( 4 weeks on 2020)  Plan of care signed (Y/N): N  Visit# / total visits:     Pain Level: 0 on scale of 1-10 at rest but 3/10 with movement, aching    Subjective: Pt States:  \"I am doing well and I am not in much pain throughout the day\"     Objective:  Updated POC to be completed by 10  visit. COVID-19 Screening completed upon entering facility and patient cleared for treatment today. Pt followed all protocols set forth by [address] [place] [address] for Outpatient services throughout therapy session. Patient has been made aware of all new hygiene protocols due to COVID-19 set forth by [address] [place] [address] Outpatient services and agrees to abide by all protocols. INTERVENTION: COMPLETED: SPECIFICS/COMMENTS:   Modality:     MHP X  To L thumb at start of session. 10 min   Fluiotherapy  15 min. For soft tissue warm-up while moving her fingers intermittently. AROM:     MCP & IP blocking x Initiated 2 sets x10 each joint blocking at MCP & IP with good tolerance. Less pain  Today during blocked MP flex/ext. [place] scrutch   X- 3 min. All fingers    Wrist flex/ext  X 10 rep's  Added this date -  no ^ in pain. Tendon gliding x Williams well        AAROM:               PROM/Stretching:     Thumb IP and MP jt x Before doing AROM exercises.     Wrist flex, extension x Slight PROM to flexion and extension of the L Wrist   Scar Mass/Edema Control:     Retrograde massage x Completed to Thumb and forearm Scar massage x All scar areas. grecia well. Strengthening:               Other:     Splint adjustment x Splint was adjusted to more comfortable fit for the patient. Reinforced the thumb web space, pt noticed that it was starting to come apart. HEP x Cont with digital tendon gliding, blocked thumb IP and MP flex/ext, added flexion and extension of the wrist., scar massage       Assessment/Comments: Pt is making Fair progress toward stated plan of care. Pt tolerated session well today with no ^ in pain and less pain while doing blocked thumb MP flex/ext, wrist flexion or extension. Pt continues to show only slight edema and low level pain. Re- assessment - AROM:      L  Hand ROM  Edema AROM [x]? PROM[]? IE 8/21/2020        THUMB  6.8 cm MP Flexion/Extension 0-50*/ 14-23* H TBA 35*        7.2 cm IP Flexion/Extension 0-80*/ 23-30* H 31* 45*          Radial Abduction 53-71* TBA 44*          Palmar Abduction 50-71* TBA 40+            Left  Wrist  AROM:                       8/21/2020  Wrist Flexion 0-80:    69*  Wrist Extension 0-70:   50*  Wrist Radial Deviation 0-20:   15*  Wrist Ulnar Deviation 0-45:   18*      -Rehab Potential: Good  -Requires OT Follow Up: Yes  Time In:  10:00 am            Time Out: 11:00 am             Visit #: 4    Treatment Charges: Mins Units   Modalities-fluioth     Ther Exercise 30 2   Manual Therapy 25 2   Thera Activities     ADL/Home Mgt      Neuro Re-education     Gait Training     Group Therapy     Non-Billable Service Time-[facility] 5 0   Other     Total Time/Units 60 4       -Response to Treatment: Pt responded well to treatment with no ^ in pain. Goals: Goals for pt can be see on initial eval occurring on 07/29/2020    Plan:   [x]  Continues Plan of care: Treatment covered based on POC and graduated to patient's progress. Pt education continues at each visit to obtain maximum benefits from skilled OT intervention.   []  Alter Plan of care:   [] Discharge:     Milad Vincent, OTR/L #[id number]  Digna Rushing OT/L, T

## 2020-08-25 ENCOUNTER — TREATMENT (OUTPATIENT)
Dept: OCCUPATIONAL THERAPY | Age: 60
End: 2020-08-25
Payer: MEDICARE

## 2020-08-25 PROCEDURE — 97140 MANUAL THERAPY 1/> REGIONS: CPT | Performed by: OCCUPATIONAL THERAPIST

## 2020-08-25 PROCEDURE — 97110 THERAPEUTIC EXERCISES: CPT | Performed by: OCCUPATIONAL THERAPIST

## 2020-08-25 PROCEDURE — 97022 WHIRLPOOL THERAPY: CPT | Performed by: OCCUPATIONAL THERAPIST

## 2020-09-01 ENCOUNTER — TREATMENT (OUTPATIENT)
Dept: OCCUPATIONAL THERAPY | Age: 60
End: 2020-09-01
Payer: MEDICARE

## 2020-09-01 PROBLEM — M18.12 PRIMARY OSTEOARTHRITIS OF FIRST CARPOMETACARPAL JOINT OF LEFT HAND: Status: ACTIVE | Noted: 2019-09-05

## 2020-09-01 PROCEDURE — 97022 WHIRLPOOL THERAPY: CPT | Performed by: OCCUPATIONAL THERAPIST

## 2020-09-01 PROCEDURE — 97140 MANUAL THERAPY 1/> REGIONS: CPT | Performed by: OCCUPATIONAL THERAPIST

## 2020-09-01 PROCEDURE — 97530 THERAPEUTIC ACTIVITIES: CPT | Performed by: OCCUPATIONAL THERAPIST

## 2020-09-01 PROCEDURE — 97110 THERAPEUTIC EXERCISES: CPT | Performed by: OCCUPATIONAL THERAPIST

## 2020-09-01 NOTE — PROGRESS NOTES
OCCUPATIONAL THERAPY PROGRESS NOTE    Date:  2020    Initial Evaluation Date: 2020                          Evaluating Therapist: Chiquita Zimmer     Patient Name:  Usman Villar                :  1960     Restrictions/Precautions:  OT Eval & treat, low fall risk  Diagnosis: L CMC Arthroplasty                                                           Insurance/Certification information: [facility] Medicare  Referring Practitioner:  Dr. Edgar Baron  Date of Surgery/Injury: 2020  ( 6  weeks on 2020)  Plan of care signed (Y/N): N  Visit# / total visits:     Pain Level: 0 on scale of 1-10 at rest but 3/10 with movement, aching    Subjective: Pt States:  \"I have been using my L hand a little for light activities - like for folding laundry. \"     Objective:  Updated POC to be completed by 10  visit. COVID-19 Screening completed upon entering facility and patient cleared for treatment today. Pt followed all protocols set forth by [facility] for Outpatient services throughout therapy session. Patient has been made aware of all new hygiene protocols due to COVID-19 set forth by [facility] Outpatient services and agrees to abide by all protocols. INTERVENTION: COMPLETED: SPECIFICS/COMMENTS:   Modality:     MHP  To L thumb at start of session. 10 min   Fluiotherapy X -15 min. For soft tissue warm-up while moving her fingers intermittently. AROM:     MCP & IP blocking x Initiated 2 sets x10 each joint blocking at MCP & IP with good tolerance. Less pain  Today during blocked MP flex/ext. [place] scrutch   X- 3 min. All fingers    Wrist flex/ext   10 rep's    no ^ in pain. Wrist er ciser X 5 min. Stretch - no pain. Chinese balls  X- 5 min. Both directions. occ v/c'es to use her thumb. Yellow putty X 2 min. Manipulation only - squeeze with fingers only. V/c'es to use her thumb to move putty around.     Tendon gliding  Williams well Fine Motor:     Opposition activity           x Opposition activity with peg board, tolerated well, slight burning in the web space of the thumb   AAROM:               PROM/Stretching:     Thumb IP and MP jt and CMC jt. x Before doing AROM exercises. Wrist flex, extension x Slight PROM to flexion and extension of the L Wrist   Scar Mass/Edema Control:     Retrograde massage x Completed to Thumb and forearm   Scar massage x All scar areas. grecia well. Strengthening:                    Other:     Splint adjustment x Splint was adjusted to more comfortable fit for the patient. HEP x Cont with digital tendon gliding, blocked thumb IP and MP flex/ext, flexion and extension of the wrist. And  Added RD/UD and CMC jt all planes, scar massage       Assessment/Comments: Pt is making Fair progress toward stated plan of care. Pt tolerated session well today with no ^ in pain and less pain while doing blocked thumb MP flex/ext, wrist flexion or extension and slight ^ at the [facility] area. Therapist did massage to decrease pain. Pt continues to show only slight edema and low level pain.      -Rehab Potential: Good  -Requires OT Follow Up: Yes  Time In:  10:00 am            Time Out:  11:00 am             Visit #: 6    Treatment Charges: Mins Units   Modalities-fluioth 15 1   Ther Exercise 15 1   Manual Therapy 15 1   Thera Activities 15 1   ADL/Home Mgt      Neuro Re-education     Gait Training     Group Therapy     Non-Billable Service Time-[facility]     Other     Total Time/Units 60 4       -Response to Treatment: Pt responded well to treatment with slight ^ in [facility] pain this date. Goals: Goals for pt can be see on initial eval occurring on 07/29/2020    Plan:   [x]  Continues Plan of care: Treatment covered based on POC and graduated to patient's progress. Pt education continues at each visit to obtain maximum benefits from skilled OT intervention.   []  Alter Plan of care:   []  Discharge:      Tova Corbin OT/L, CHT

## 2020-09-04 ENCOUNTER — TREATMENT (OUTPATIENT)
Dept: OCCUPATIONAL THERAPY | Age: 60
End: 2020-09-04
Payer: MEDICARE

## 2020-09-04 PROCEDURE — 97110 THERAPEUTIC EXERCISES: CPT | Performed by: OCCUPATIONAL THERAPIST

## 2020-09-04 PROCEDURE — 97140 MANUAL THERAPY 1/> REGIONS: CPT | Performed by: OCCUPATIONAL THERAPIST

## 2020-09-04 PROCEDURE — 97530 THERAPEUTIC ACTIVITIES: CPT | Performed by: OCCUPATIONAL THERAPIST

## 2020-09-04 PROCEDURE — 97022 WHIRLPOOL THERAPY: CPT | Performed by: OCCUPATIONAL THERAPIST

## 2020-09-04 NOTE — PROGRESS NOTES
OCCUPATIONAL THERAPY PROGRESS NOTE    Date:  2020    Initial Evaluation Date: 2020                          Evaluating Therapist: Kamila Regalado     Patient Name:  Nikole Gil                :  1960     Restrictions/Precautions:  OT Eval & treat, low fall risk  Diagnosis: L [facility] Arthroplasty                                                           Insurance/Certification information: [employer]  Referring Practitioner:  Dr. Ministerio Blanchard  Date of Surgery/Injury: 2020   (6  weeks on 2020)  Plan of care signed (Y/N): N  Visit# / total visits:     Pain Level: 0 on scale of 1-10 at rest but 3/10 with movement, aching    Subjective: Pt States: \"This left hand is doing so much better then my R hand did. I am wearing this black brace more now. \"     Objective:  Updated POC to be completed by 10 th visit. COVID-19 Screening completed upon entering facility and patient cleared for treatment today. Pt followed all protocols set forth by [facility] for Outpatient services throughout therapy session. Patient has been made aware of all new hygiene protocols due to COVID-19 set forth by [facility] Outpatient services and agrees to abide by all protocols. INTERVENTION: COMPLETED: SPECIFICS/COMMENTS:   Modality:     MHP  To L thumb at start of session. 10 min   Fluiotherapy X -15 min. For soft tissue warm-up while moving her fingers intermittently. AROM:     MCP & IP blocking x Initiated 2 sets x10 each joint blocking at MCP & IP with good tolerance. Towel scrutch   X- 3 min. All fingers    Wrist flex/ext   10 rep's    no ^ in pain. Wrist er ciser X 5 min. Stretch - no pain. Chinese balls  X- 5 min. Both directions. occ v/c'es to use her thumb. Yellow putty  2 min. Manipulation only - squeeze with fingers only. V/c'es to use her thumb to move putty around.     Tendon gliding  Williams well        Fine Motor:     Opposition activity           x Opposition activity with small pegs - no pain today to SF- slight cramping at the end. Also beading with the string. AAROM:               PROM/Stretching:     Blue roller X 10 rep's  Wrist ext and wrist flexion stretch - 5 sec holds end range. Thumb IP and MP jt and CMC jt. x Before doing AROM exercises. Wrist flex, extension x Slight PROM to flexion and extension of the L Wrist   Scar Mass/Edema Control:     Retrograde massage x Completed to Thumb and forearm   Scar massage x All scar areas. grecia well. Strengthening:                    Other:     Splint adjustment x Splint was adjusted to more comfortable fit for the patient. HEP x Cont with digital tendon gliding, blocked thumb IP and MP flex/ext, flexion and extension of the wrist. And  Added RD/UD and CMC jt all planes, scar massage       Assessment/Comments: Pt is making Fair progress toward stated plan of care. Pt tolerated session well today with no ^ in pain and less pain while doing blocked thumb MP flex/ext, wrist flexion or extension and slight ^ at the [facility] area. Pt continues to show only slight edema and low level pain and is weaning off her hard splint.      -Rehab Potential: Good  -Requires OT Follow Up: Yes  Time In:  10:00 am            Time Out:  11:00 am             Visit #: 7    Treatment Charges: Mins Units   Modalities-fluioth 15 1   Ther Exercise 15 1   Manual Therapy 15 1   Thera Activities 15 1   ADL/Home Mgt      Neuro Re-education     Gait Training     Group Therapy     Non-Billable Service Time-MHP     Other     Total Time/Units 60 4       -Response to Treatment: Pt responded well to treatment with only slight ^ in [facility] pain this date. Goals: Goals for pt can be see on initial eval occurring on 07/29/2020    Plan:   [x]  Continues Plan of care: Treatment covered based on POC and graduated to patient's progress.  Pt education continues at each visit to obtain maximum benefits from skilled OT intervention.   []  Alter Plan of care:   []  Discharge:      Layla Hinds OT/L, T

## 2020-09-11 ENCOUNTER — TREATMENT (OUTPATIENT)
Dept: OCCUPATIONAL THERAPY | Age: 60
End: 2020-09-11
Payer: MEDICARE

## 2020-09-11 PROCEDURE — 97530 THERAPEUTIC ACTIVITIES: CPT | Performed by: OCCUPATIONAL THERAPIST

## 2020-09-11 PROCEDURE — 97140 MANUAL THERAPY 1/> REGIONS: CPT | Performed by: OCCUPATIONAL THERAPIST

## 2020-09-11 PROCEDURE — 97110 THERAPEUTIC EXERCISES: CPT | Performed by: OCCUPATIONAL THERAPIST

## 2020-09-11 NOTE — PROGRESS NOTES
OCCUPATIONAL THERAPY PROGRESS NOTE    Date:  2020    Initial Evaluation Date: 2020                          Evaluating Therapist: Rebecca Lambert     Patient Name:  Ron Hilario                :  1960     Restrictions/Precautions:  OT Eval & treat, low fall risk  Diagnosis: L [facility] Arthroplasty                                                           Insurance/Certification information: [facility] Medicare  Referring Practitioner:  Dr. Merlyn Bettencourt  Date of Surgery/Injury: 2020   (8  weeks on 2020)  Plan of care signed (Y/N): N  Visit# / total visits:     Pain Level: 0 on scale of 1-10 at rest but 3/10 with movement, aching    Subjective: Pt States:  \"I'm just wearing black brace now - unless I over due it - then I put that hard splint on. \"     Objective:  Updated POC to be completed by 10 th visit. COVID-19 Screening completed upon entering facility and patient cleared for treatment today. Pt followed all protocols set forth by [facility] for Outpatient services throughout therapy session. Patient has been made aware of all new hygiene protocols due to COVID-19 set forth by [facility] Outpatient services and agrees to abide by all protocols. INTERVENTION: COMPLETED: SPECIFICS/COMMENTS:   Modality:     MHP x To L thumb at start of session - both hands today. 10 min   Fluiotherapy  -15 min. For soft tissue warm-up while moving her fingers intermittently. AROM:     MCP & IP blocking x Initiated 2 sets x10 each joint blocking at MCP & IP with good tolerance. Towel scrutch   x- 3 min. All fingers    Wrist flex/ext   10 rep's    no ^ in pain. Wrist er ciser  5 min. Stretch - no pain. Chinese balls  X- 5 min. Both directions. occ v/c'es to use her thumb. Used her small balls today also. Yellow putty  2 min. Manipulation only - squeeze with fingers only. V/c'es to use her thumb to move putty around.     Foam 1\" pegs  X 2 sets Push in and out - 2 sets. Slight ^ in pain. Tendon gliding  Grecia well        Fine Motor:     Opposition activity            Opposition activity with small pegs - no pain today to SF- slight cramping at the end. Also beading with the string. AAROM:               PROM/Stretching:     Blue roller X 10 rep's  Wrist ext and wrist flexion stretch - 5 sec holds end range. Thumb IP and MP jt and CMC jt. x Before doing AROM exercises. Wrist flex, extension x Slight PROM to flexion and extension of the L Wrist   Scar Mass/Edema Control:     Retrograde massage x Completed to Thumb and forearm   Scar massage x All scar areas. grecia well. Strengthening:     Yellow putty x Gripping 3 min, roll and 3 pt pinch 2 sets. Slight ^ pain with pinching   1# wt X- 15 rep's Wrist and forearm all planes             Other:     Splint adjustment x Splint was adjusted to more comfortable fit for the patient. HEP x Cont with digital tendon gliding, blocked thumb IP and MP flex/ext, flexion and extension of the wrist RD/UD and CMC jt all planes, scar massage. Began putty this date. Assessment/Comments: Pt is making Fair progress toward stated plan of care. Pt tolerated session well today with slight ^ in pain with prehension activities. Pt continues to show only slight edema and low level pain.      -Rehab Potential: Good  -Requires OT Follow Up: Yes  Time In:  10:00 am            Time Out:  11:05 am             Visit #: 8    Treatment Charges: Mins Units   Modalities-fluioth     Ther Exercise 25 2   Manual Therapy 15 1   Thera Activities 15 1   ADL/Home Mgt      Neuro Re-education     Gait Training     Group Therapy     Non-Billable Service Time-[facility] 10 0   Other     Total Time/Units 65 4       -Response to Treatment: Pt responded well to treatment with only slight ^ in [facility] pain this date. Began strengthening this date with slight ^ in thumb pain with pinching. Pt to listen to her pain level and adjust as needed.

## 2020-09-15 ENCOUNTER — TREATMENT (OUTPATIENT)
Dept: OCCUPATIONAL THERAPY | Age: 60
End: 2020-09-15
Payer: MEDICARE

## 2020-09-15 PROCEDURE — 97110 THERAPEUTIC EXERCISES: CPT | Performed by: OCCUPATIONAL THERAPIST

## 2020-09-15 PROCEDURE — 97140 MANUAL THERAPY 1/> REGIONS: CPT | Performed by: OCCUPATIONAL THERAPIST

## 2020-09-15 PROCEDURE — 97530 THERAPEUTIC ACTIVITIES: CPT | Performed by: OCCUPATIONAL THERAPIST

## 2020-09-15 NOTE — PROGRESS NOTES
- 2 sets. Slight ^ in pain. Tendon gliding  Grecia well        Fine Motor:     Opposition activity            Opposition activity with small pegs - no pain today to SF- slight cramping at the end. Also beading with the string. AAROM:               PROM/Stretching:     Blue roller 10 rep's  Wrist ext and wrist flexion stretch - 5 sec holds end range. Thumb IP and MP jt and CMC jt. x Before doing AROM exercises. Wrist flex, extension x Slight PROM to flexion and extension of the L Wrist   Scar Mass/Edema Control:     Retrograde massage x Completed to Thumb and forearm   Scar massage x All scar areas. grecia well. Strengthening:     Yellow putty x Gripping 3 min, roll and 3 pt pinch 2 sets. Slight ^ pain with pinching   1# wt X- 2x 15 rep's Wrist and forearm all planes. ^ed from 1x15 reps   Small pegs and peg board  x 15 pegs in yellow putty and placed in peg board        Other:     Splint adjustment  Splint was adjusted to more comfortable fit for the patient. HEP x Cont with digital tendon gliding, blocked thumb IP and MP flex/ext, flexion and extension of the wrist RD/UD and CMC jt all planes, scar massage and putty. Assessment/Comments: Pt is making Fair progress toward stated plan of care. Pt tolerated session well today with no increase in pain with strengthening activities. Pt demonstrated increased endurance and ROM with ROM and strengthening activities.   Pt continues to show only slight edema and low level pain.      -Rehab Potential: Good  -Requires OT Follow Up: Yes  Time In:  10:00 am            Time Out:  11:00 am             Visit #: 9    Treatment Charges: Mins Units   Modalities-fluioth     Ther Exercise 25 2   Manual Therapy 20 1   Thera Activities 15 1   ADL/Home Mgt      Neuro Re-education     Gait Training     Group Therapy     Non-Billable Service Time-P     Other     Total Time/Units 60 4       -Response to Treatment: good, patient tolerated activities well and increased endurance to strengthening activities. Goals: Goals for pt can be see on initial eval occurring on 07/29/2020    Plan:   [x]  Continues Plan of care: Treatment covered based on POC and graduated to patient's progress. Pt education continues at each visit to obtain maximum benefits from skilled OT intervention.   []  Alter Plan of care:   []  Discharge:      Grei Flores [address], OTR/L #[id number]  Milad Avila

## 2020-09-18 ENCOUNTER — TREATMENT (OUTPATIENT)
Dept: OCCUPATIONAL THERAPY | Age: 60
End: 2020-09-18
Payer: MEDICARE

## 2020-09-18 PROCEDURE — 97530 THERAPEUTIC ACTIVITIES: CPT | Performed by: OCCUPATIONAL THERAPIST

## 2020-09-18 PROCEDURE — 97110 THERAPEUTIC EXERCISES: CPT | Performed by: OCCUPATIONAL THERAPIST

## 2020-09-18 PROCEDURE — 97140 MANUAL THERAPY 1/> REGIONS: CPT | Performed by: OCCUPATIONAL THERAPIST

## 2020-09-18 NOTE — PROGRESS NOTES
OCCUPATIONAL THERAPY PROGRESS NOTE/RE-ASSESSMENT    Date:  2020    Initial Evaluation Date: 2020                          Evaluating Therapist: Kamila Regalado     Patient Name:  Nikole Gil                :  1960     Restrictions/Precautions:  OT Eval & treat, low fall risk  Diagnosis: L [facility] Arthroplasty                                                           Insurance/Certification information: [facility] Medicare  Referring Practitioner:  Dr. Ministerio Blanchard  Date of Surgery/Injury: 2020   (9 weeks)  Plan of care signed (Y/N): N  Visit# / total visits: 10 / 18    Pain Level: 2 on scale of 1-10 at rest but 3/10 with movement, aching    Subjective: Pt States she had tightness in her first finger more than she has. Objective:  Updated POC to be completed by 10 th visit. COVID-19 Screening completed upon entering facility and patient cleared for treatment today. Pt followed all protocols set forth by [facility] for Outpatient services throughout therapy session. Patient has been made aware of all new hygiene protocols due to COVID-19 set forth by [facility] Outpatient services and agrees to abide by all protocols. INTERVENTION: COMPLETED: SPECIFICS/COMMENTS:   Modality:     MHP x To L thumb at start of session - both hands today. 10 min   Fluiotherapy  For soft tissue warm-up while moving her fingers intermittently. AROM:     MCP & IP blocking  Initiated 2 sets x10 each joint blocking at MCP & IP with good tolerance. Towel scrutch     All fingers    Wrist flex/ext     no ^ in pain. Wrist er ciser  5 min. Stretch - no pain. Chinese balls  X- 5 min. Both directions. occ v/c'es to use her thumb. Yellow putty  Manipulation only - squeeze with fingers only. V/c'es to use her thumb to move putty around. Foam 1\" pegs   Push in and out - 2 sets. Slight ^ in pain.     Tendon gliding  Williams well        Fine Motor:     Opposition activity            Opposition activity with small pegs - no pain today to SF- slight cramping at the end. Also beading with the string. AAROM:               PROM/Stretching:     Blue roller   Wrist ext and wrist flexion stretch - 5 sec holds end range. Thumb IP and MP jt and CMC jt. x Before doing AROM exercises. Wrist flex, extension  Slight PROM to flexion and extension of the L Wrist   Scar Mass/Edema Control:     Retrograde massage x Completed to Thumb and forearm   Scar massage x All scar areas. grecia well. Strengthening:     Yellow putty x Gripping 3 min, roll and 3 pt pinch 2 sets. Slight ^ pain with pinching   1# wt x Wrist and forearm all planes. ^ed from 1x15 reps   Bead activity x Using a mixture of soft and x-soft pinching out beads from putty and placing into container. Small pegs and peg board   15 pegs in yellow putty and placed in peg board   therabar- Yellow x x15 supination, pronation and twisting   Other:     Intrinsic muscle exercises x Issued to assist with intrinsic tightness and completed for pt with exercises issued for HEP   Splint adjustment  Splint was adjusted to more comfortable fit for the patient. HEP x Cont with digital tendon gliding, blocked thumb IP and MP flex/ext, flexion and extension of the wrist RD/UD and CMC jt all planes, scar massage and putty. Assessment/Comments: Pt is making Fair progress toward stated plan of care. Re-assessment completed this date with good improvement in ROM. Strength was tested and more strengthening exercises were completed this date. Pt would like to come 1x/week for the remainder of her sessions due to feeling more comfortable and wanting to work on strengthening. Pt did report tight intrinsic muscles and exercises were provided to complete with HEP to assist with decreasing the pain/tightness. Pt is progressing towards all established goals at this time. L  Hand ROM  Edema AROM [x]? PROM[]?  IE  09/18/2020       THUMB  6.8 cm MP Flexion/Extension 0-50*/ 14-23* H TBA 50*        7.2 cm IP Flexion/Extension 0-80*/ 23-30* H 31*  54*         Radial Abduction 53-71* TBA  40*         Palmar Abduction 50-71* TBA 40*           Comment: Hand Dominance is R     Dynamometer (setting 2):                              Left: 35 #                                              Right: 43#                    Pinch Meter:              Lateral: Left= 5# with pain reported 4/10   ,Right= 7#              Palmar 3 point: Left=7#     Right=9#   9 Hole Peg Test:              Left: 24 seconds               Right: 20 seconds     QuickDASH Score: 34.1% disability reported   Updated QuickDASH Score: 25 % disability reported    -Rehab Potential: Good  -Requires OT Follow Up: Yes  Time In:  10:00 am            Time Out:  11:00 am             Visit #: 10    Treatment Charges: Mins Units   Modalities-fluioth     Ther Exercise 25 2   Manual Therapy 20 1   Thera Activities 15 1   ADL/Home Mgt      Neuro Re-education     Gait Training     Group Therapy     Non-Billable Service Time-[facility]     Other     Total Time/Units 60 4       -Response to Treatment: good, patient tolerated activities well and increased endurance to strengthening activities. Goals: Goals for pt can be see on initial eval occurring on 07/29/2020    Plan:   [x]  Continues Plan of care: Treatment covered based on POC and graduated to patient's progress. Pt education continues at each visit to obtain maximum benefits from skilled OT intervention.   []  Alter Plan of care:   []  Discharge:      Marichuy Flores [address], OTR/L #[id number]

## 2020-09-21 ENCOUNTER — HOSPITAL ENCOUNTER (OUTPATIENT)
Age: 60
Discharge: HOME | End: 2020-09-21
Payer: MEDICARE

## 2020-09-21 LAB
ALBUMIN SERPL-MCNC: 4.2 G/DL (ref 3.5–5.2)
ALP BLD-CCNC: 134 U/L (ref 35–104)
ALT SERPL-CCNC: 19 U/L (ref 0–32)
ANION GAP SERPL CALCULATED.3IONS-SCNC: 7 MMOL/L (ref 7–16)
AST SERPL-CCNC: 15 U/L (ref 0–31)
BILIRUB SERPL-MCNC: 0.4 MG/DL (ref 0–1.2)
BUN BLDV-MCNC: 13 MG/DL (ref 8–23)
CALCIUM SERPL-MCNC: 9.6 MG/DL (ref 8.6–10.2)
CHLORIDE BLD-SCNC: 107 MMOL/L (ref 98–107)
CHOLESTEROL, TOTAL: 218 MG/DL (ref 0–199)
CO2: 27 MMOL/L (ref 22–29)
CREAT SERPL-MCNC: 0.7 MG/DL (ref 0.5–1)
FERRITIN: 36 NG/ML
FOLATE: 12.9 NG/ML (ref 4.8–24.2)
GFR AFRICAN AMERICAN: >60
GFR NON-AFRICAN AMERICAN: >60 ML/MIN/1.73
GLUCOSE BLD-MCNC: 93 MG/DL (ref 74–99)
HCT VFR BLD CALC: 42.4 % (ref 34–48)
HEMOGLOBIN: 14 G/DL (ref 11.5–15.5)
MCH RBC QN AUTO: 32 PG (ref 26–35)
MCHC RBC AUTO-ENTMCNC: 33 % (ref 32–34.5)
MCV RBC AUTO: 97 FL (ref 80–99.9)
PARATHYROID HORMONE INTACT: 65 PG/ML (ref 15–65)
PDW BLD-RTO: 13.2 FL (ref 11.5–15)
PLATELET # BLD: 282 E9/L (ref 130–450)
PMV BLD AUTO: 9.8 FL (ref 7–12)
POTASSIUM SERPL-SCNC: 4.2 MMOL/L (ref 3.5–5)
PREALBUMIN: 25 MG/DL (ref 20–40)
RBC # BLD: 4.37 E12/L (ref 3.5–5.5)
SODIUM BLD-SCNC: 141 MMOL/L (ref 132–146)
T4 FREE: 1.17 NG/DL (ref 0.93–1.7)
TOTAL PROTEIN: 7.1 G/DL (ref 6.4–8.3)
TRIGL SERPL-MCNC: 97 MG/DL (ref 0–149)
TSH SERPL DL<=0.05 MIU/L-ACNC: 3.06 UIU/ML (ref 0.27–4.2)
VITAMIN B-12: 704 PG/ML (ref 211–946)
VITAMIN D 25-HYDROXY: 59 NG/ML (ref 30–100)
WBC # BLD: 6.3 E9/L (ref 4.5–11.5)

## 2020-09-21 PROCEDURE — 84425 ASSAY OF VITAMIN B-1: CPT

## 2020-09-21 PROCEDURE — 84478 ASSAY OF TRIGLYCERIDES: CPT

## 2020-09-21 PROCEDURE — 82746 ASSAY OF FOLIC ACID SERUM: CPT

## 2020-09-21 PROCEDURE — 82728 ASSAY OF FERRITIN: CPT

## 2020-09-21 PROCEDURE — 82306 VITAMIN D 25 HYDROXY: CPT

## 2020-09-21 PROCEDURE — 80053 COMPREHEN METABOLIC PANEL: CPT

## 2020-09-21 PROCEDURE — 84134 ASSAY OF PREALBUMIN: CPT

## 2020-09-21 PROCEDURE — 85027 COMPLETE CBC AUTOMATED: CPT

## 2020-09-21 PROCEDURE — 84630 ASSAY OF ZINC: CPT

## 2020-09-21 PROCEDURE — 36415 COLL VENOUS BLD VENIPUNCTURE: CPT

## 2020-09-21 PROCEDURE — 82465 ASSAY BLD/SERUM CHOLESTEROL: CPT

## 2020-09-21 PROCEDURE — 82607 VITAMIN B-12: CPT

## 2020-09-21 PROCEDURE — 84439 ASSAY OF FREE THYROXINE: CPT

## 2020-09-21 PROCEDURE — 84443 ASSAY THYROID STIM HORMONE: CPT

## 2020-09-21 PROCEDURE — 83970 ASSAY OF PARATHORMONE: CPT

## 2020-09-25 ENCOUNTER — TREATMENT (OUTPATIENT)
Dept: OCCUPATIONAL THERAPY | Age: 60
End: 2020-09-25
Payer: MEDICARE

## 2020-09-25 PROCEDURE — 97110 THERAPEUTIC EXERCISES: CPT | Performed by: OCCUPATIONAL THERAPIST

## 2020-09-25 PROCEDURE — 97530 THERAPEUTIC ACTIVITIES: CPT | Performed by: OCCUPATIONAL THERAPIST

## 2020-09-25 PROCEDURE — 97140 MANUAL THERAPY 1/> REGIONS: CPT | Performed by: OCCUPATIONAL THERAPIST

## 2020-09-25 NOTE — PROGRESS NOTES
OCCUPATIONAL THERAPY PROGRESS NOTE    Date:  2020    Initial Evaluation Date: 2020                          Evaluating Therapist: Peter Duvall     Patient Name:  Tex Noyola                :  1960     Restrictions/Precautions:  OT Eval & treat, low fall risk  Diagnosis: L [facility] Arthroplasty                                                           Insurance/Certification information: [facility] Medicare  Referring Practitioner:  Dr. Katelyn Felipe  Date of Surgery/Injury: 2020   (10 weeks)  Plan of care signed (Y/N): N  Visit# / total visits:     Pain Level: 1-2 on scale of 1-10 at rest but 3/10 with movement, aching    Subjective: Pt states \"I am doing good, I am just really tight right here (web space of thumb and index finger)\"     Objective:  Updated POC to be completed by 10 th visit. COVID-19 Screening completed upon entering facility and patient cleared for treatment today. Pt followed all protocols set forth by [facility] for Outpatient services throughout therapy session. Patient has been made aware of all new hygiene protocols due to COVID-19 set forth by [facility] Outpatient services and agrees to abide by all protocols. INTERVENTION: COMPLETED: SPECIFICS/COMMENTS:   Modality:     MHP x To L thumb at start of session - both hands today. 10 min   Fluiotherapy  For soft tissue warm-up while moving her fingers intermittently. AROM:     MCP & IP blocking  Initiated 2 sets x10 each joint blocking at MCP & IP with good tolerance. Towel scrutch     All fingers    Wrist flex/ext     no ^ in pain. Wrist er ciser  5 min. Stretch - no pain. Chinese balls  X- 5 min. Both directions. occ v/c'es to use her thumb. Yellow putty x Manipulation only - squeeze with fingers only. V/c'es to use her thumb to move putty around. Foam 1\" pegs   Push in and out - 2 sets. Slight ^ in pain.     Tendon gliding  Williams well        Fine Motor: progressing towards all established goals at this time.    -Rehab Potential: Good  -Requires OT Follow Up: Yes  Time In:  10:00 am            Time Out:  11:00 am             Visit #: 11    Treatment Charges: Mins Units   Modalities-fluioth     Ther Exercise 25 2   Manual Therapy 20 1   Thera Activities 15 1   ADL/Home Mgt      Neuro Re-education     Gait Training     Group Therapy     Non-Billable Service Time-MHP     Other     Total Time/Units 60 4       -Response to Treatment: good, patient tolerated activities well and increased endurance to strengthening activities. Goals: Goals for pt can be see on initial eval occurring on 07/29/2020    Plan:   [x]  Continues Plan of care: Treatment covered based on POC and graduated to patient's progress. Pt education continues at each visit to obtain maximum benefits from skilled OT intervention.   []  Alter Plan of care:   []  Discharge:      Amy Calvillo OT/YESI , CHT  OT [address] [place], [place] #[id number]

## 2020-09-26 LAB
VITAMIN B1 WHOLE BLOOD: 182 NMOL/L (ref 70–180)
ZINC: 76.6 UG/DL (ref 60–120)

## 2020-10-02 ENCOUNTER — TREATMENT (OUTPATIENT)
Dept: OCCUPATIONAL THERAPY | Age: 60
End: 2020-10-02
Payer: MEDICARE

## 2020-10-02 PROCEDURE — 97140 MANUAL THERAPY 1/> REGIONS: CPT | Performed by: OCCUPATIONAL THERAPIST

## 2020-10-02 PROCEDURE — 97110 THERAPEUTIC EXERCISES: CPT | Performed by: OCCUPATIONAL THERAPIST

## 2020-10-02 PROCEDURE — 97530 THERAPEUTIC ACTIVITIES: CPT | Performed by: OCCUPATIONAL THERAPIST

## 2020-10-02 NOTE — PROGRESS NOTES
OCCUPATIONAL THERAPY PROGRESS NOTE    Date:  10/2/2020    Initial Evaluation Date: 2020                          Evaluating Therapist: Chandrika York     Patient Name:  Jacob Polo                :  1960     Restrictions/Precautions:  OT Eval & treat, low fall risk  Diagnosis: L [facility] Arthroplasty                                                           Insurance/Certification information: [facility] Medicare  Referring Practitioner:  Dr. Ramos Monsivais  Date of Surgery/Injury: 2020   (10 weeks)  Plan of care signed (Y/N): N  Visit# / total visits:     Pain Level: 1-2 on scale of 1-10 at rest but 3/10 with movement, aching    Subjective: Pt states \"I am less tight right here (web space) but I am having a lot of pain when I bend my wrist down (flexion)\"     Objective:  Updated POC to be completed by 10 th visit. COVID-19 Screening completed upon entering facility and patient cleared for treatment today. Pt followed all protocols set forth by [address] [place] [address] for Outpatient services throughout therapy session. Patient has been made aware of all new hygiene protocols due to COVID-19 set forth by [address] [place] [address] Outpatient services and agrees to abide by all protocols. INTERVENTION: COMPLETED: SPECIFICS/COMMENTS:   Modality:     MHP x To L thumb at start of session 10 min   Fluiotherapy  For soft tissue warm-up while moving her fingers intermittently. AROM:     MCP & IP blocking  Initiated 2 sets x10 each joint blocking at MCP & IP with good tolerance. Towel scrutch     All fingers    Wrist flex/ext     no ^ in pain. Wrist er ciser x 5 min. 2.5 mins with elbow off table, 2.5 mins with elbow on table for wrist isolation         Chinese balls  X- 5 min. Both directions. occ v/c'es to use her thumb. Yellow putty x Manipulation only - squeeze with fingers only. V/c'es to use her thumb to move putty around. Foam 1\" pegs   Push in and out - 2 sets.   Slight ^ activities. Pt continues to demonstrate intrinsic tightness of the index finger and web space of the thumb, therapist performed massage to the area with good results of decreased tightness. Pt is progressing towards all established goals at this time.    -Rehab Potential: Good  -Requires OT Follow Up: Yes  Time In:  10:00 am            Time Out:  11:00 am             Visit #: 12    Treatment Charges: Mins Units   Modalities-fluioth     Ther Exercise 25 2   Manual Therapy 20 1   Thera Activities 15 1   ADL/Home Mgt      Neuro Re-education     Gait Training     Group Therapy     Non-Billable Service Time-[facility]     Other     Total Time/Units 60 4       -Response to Treatment: good, patient tolerated activities well and increased endurance to strengthening activities. Goals: Goals for pt can be see on initial eval occurring on 07/29/2020    Plan:   [x]  Continues Plan of care: Treatment covered based on POC and graduated to patient's progress. Pt education continues at each visit to obtain maximum benefits from skilled OT intervention.   []  Alter Plan of care:   []  Discharge:      Debby Garcia OT/L , CHT  OT [address] [place], [place] #[id number]

## 2020-10-09 ENCOUNTER — TREATMENT (OUTPATIENT)
Dept: OCCUPATIONAL THERAPY | Age: 60
End: 2020-10-09

## 2020-10-09 NOTE — PROGRESS NOTES
OCCUPATIONAL THERAPY PROGRESS NOTE    Date:  10/9/2020    Initial Evaluation Date: 2020                          Evaluating Therapist: Sharlene Loya     Patient Name:  Lisette Parikh                :  1960     Restrictions/Precautions:  OT Eval & treat, low fall risk  Diagnosis: L CMC Arthroplasty                                                           Insurance/Certification information: [facility] Medicare  Referring Practitioner:  Dr. Rita Little  Date of Surgery/Injury: 2020   (10 weeks)  Plan of care signed (Y/N): N  Visit# / total visits:     Pain Level: 1-2 on scale of 1-10 at rest but 3/10 with movement, aching    Subjective: Pt states \" I am having a bit more pain today but that's because I helped my  rake leaves yesterday\" and \"I can not hold my phone for a long time until I get burning pain in my thumb\"     Objective:  Updated POC to be completed by 10 th visit. COVID-19 Screening completed upon entering facility and patient cleared for treatment today. Pt followed all protocols set forth by [facility] for Outpatient services throughout therapy session. Patient has been made aware of all new hygiene protocols due to COVID-19 set forth by [facility] Outpatient services and agrees to abide by all protocols. INTERVENTION: COMPLETED: SPECIFICS/COMMENTS:   Modality:     MHP  To L thumb at start of session 10 min   Fluiotherapy x For soft tissue warm-up while moving her fingers intermittently. AROM:     MCP & IP blocking  Initiated 2 sets x10 each joint blocking at MCP & IP with good tolerance. Towel scrutch     All fingers    Wrist flex/ext     no ^ in pain. Wrist er ciser x 5 min. 2.5 mins with elbow off table, 2.5 mins with elbow on table for wrist isolation         Chinese balls  X- 5 min. Both directions. occ v/c'es to use her thumb. Yellow putty  Manipulation only - squeeze with fingers only.   V/c'es to use her thumb to move putty around. Foam 1\" pegs   Push in and out - 2 sets. Slight ^ in pain. Tendon gliding  Grecia well        Fine Motor: In hand manipulation   Marbles,  5 at a time one at a time and place in a container one at a time    Opposition activity            Opposition activity with small pegs - no pain today to SF- slight cramping at the end. Also beading with the string. AAROM:               PROM/Stretching:     Blue roller   Wrist ext and wrist flexion stretch - 5 sec holds end range. Thumb IP and MP jt and CMC jt. x Before doing AROM exercises. Wrist flex, extension x Slight PROM to flexion and extension of the L Wrist   Scar Mass/Edema Control:     Retrograde massage x Completed to Thumb and forearm   Scar massage x All scar areas. grecia well. Deep tendon massage - 15 min To IF flexor tendon cord and web of thumb - beginning and end of session. Strengthening:     Yellow putty x Gripping 3 min, roll and 3 pt pinch 2 sets. Palmar and radial abduction pinches added this date, tolerated well. 2# wt x Wrist and forearm all planes from 1x10 reps   1# wt x Wrist and forearm all planes from 2x10 reps    Bead activity  Using a medium putty pinching out beads from putty and placing into container. Small pegs and peg board   20 pegs in yellow putty and placed in peg board   therabar- Yellow x x20 supination, pronation and twisting   Other:     Intrinsic muscle exercises  Issued to assist with intrinsic tightness and completed for pt with exercises issued for HEP   Splint adjustment  Splint was adjusted to more comfortable fit for the patient. HEP x Cont with digital tendon gliding, blocked thumb IP and MP flex/ext, flexion and extension of the wrist RD/UD and CMC jt all planes, scar massage and putty. Assessment/Comments: Pt is making Fair progress toward stated plan of care.  Pt demonstrated decreased intrinsic tightness of the index finger and web space of the thumb this date and states the pain in that area has not returned. Pt performed palmar and radial abduction strengthening activity with putty this date in order to increase endurance with holding her phone for a prolonged period of time. Pt is progressing towards all established goals at this time.    -Rehab Potential: Good  -Requires OT Follow Up: Yes  Time In:  10:00 am            Time Out:  11:00 am             Visit #: 13    Treatment Charges: Mins Units   Modalities-fluioth 15 1   Ther Exercise 25 2   Manual Therapy 20 1   Thera Activities     ADL/Home Mgt      Neuro Re-education     Gait Training     Group Therapy     Non-Billable Service Time-MHP     Other     Total Time/Units 60 4       -Response to Treatment: good, patient tolerated activities well and increased endurance to strengthening activities. Goals: Goals for pt can be see on initial eval occurring on 07/29/2020    Plan:   [x]  Continues Plan of care: Treatment covered based on POC and graduated to patient's progress. Pt education continues at each visit to obtain maximum benefits from skilled OT intervention.   []  Alter Plan of care:   []  Discharge:      Aminta Castillo OT/L , CHT  OT [address] [place], [place] #[id number]

## 2020-10-22 ENCOUNTER — OFFICE VISIT (OUTPATIENT)
Dept: BARIATRICS/WEIGHT MGMT | Age: 60
End: 2020-10-22
Payer: MEDICARE

## 2020-10-22 VITALS
WEIGHT: 159 LBS | SYSTOLIC BLOOD PRESSURE: 127 MMHG | TEMPERATURE: 97.3 F | HEART RATE: 91 BPM | HEIGHT: 65 IN | DIASTOLIC BLOOD PRESSURE: 73 MMHG | RESPIRATION RATE: 20 BRPM | BODY MASS INDEX: 26.49 KG/M2

## 2020-10-22 PROCEDURE — 99211 OFF/OP EST MAY X REQ PHY/QHP: CPT

## 2020-10-22 PROCEDURE — 99213 OFFICE O/P EST LOW 20 MIN: CPT | Performed by: SURGERY

## 2020-10-22 RX ORDER — TRETINOIN 0.4 MG/G
GEL TOPICAL
COMMUNITY
Start: 2020-09-25

## 2020-10-22 RX ORDER — AZELASTINE 1 MG/ML
SPRAY, METERED NASAL 2 TIMES DAILY
COMMUNITY
Start: 2020-07-21

## 2020-10-22 RX ORDER — METRONIDAZOLE 10 MG/G
GEL TOPICAL
COMMUNITY
Start: 2020-08-12

## 2020-10-22 RX ORDER — LEVOTHYROXINE SODIUM 0.07 MG/1
75 TABLET ORAL DAILY
COMMUNITY
Start: 2020-09-30 | End: 2020-11-04 | Stop reason: DRUGHIGH

## 2020-10-22 RX ORDER — FEXOFENADINE HCL AND PSEUDOEPHEDRINE HCI 180; 240 MG/1; MG/1
1 TABLET, EXTENDED RELEASE ORAL DAILY
COMMUNITY
End: 2021-11-18

## 2020-10-22 RX ORDER — ONDANSETRON 4 MG/1
TABLET, FILM COATED ORAL
COMMUNITY
Start: 2020-07-17

## 2020-10-22 NOTE — PATIENT INSTRUCTIONS
Please continue to take your vitamin and mineral supplements as instructed. If you received a blood work prescription today for laboratory monitoring due prior to your next routine follow-up visit, please have this blood work obtained 10 to 14 days prior to your next visit. It is important to fast for 12 hours prior to routine weight loss surgery blood work, EXCEPT for drinking water, to ensure accuracy of results. Please report nausea, vomiting, abdominal pain, or any other problems you experience to your surgeon. For problems related to weight loss surgery, it is best to go to [address] [place] [address] Emergency Department and have your surgeon paged.

## 2020-10-22 NOTE — PROGRESS NOTES
Pt here for 16 yr RYGB. Water intake 60 oz. Bowels moving daily. Pt is taking vitamin gummies and pt is getting proteins from meats and foods. Pt has no other concerns or problems.

## 2020-10-22 NOTE — PROGRESS NOTES
therapeutic multivitamin-minerals (THERAGRAN-M) tablet Take 1 tablet by mouth 2 times daily Gummies Yes Historical Provider, MD   Ferrous Fumarate (IRON) 18 MG TBCR Take 2 tablets by mouth daily  Yes Historical Provider, MD   Coenzyme Q-10 100 MG CAPS Take 1 capsule by mouth daily  Yes Historical Provider, MD   Magnesium 250 MG TABS Take 500 mg by mouth every evening  Yes Historical Provider, MD   folic acid (FOLVITE) 1 MG tablet Take 800 mcg by mouth daily   Historical Provider, MD     Allergies: Ketorolac tromethamine; Lyrica [pregabalin]; Morphine; Pcn [penicillins]; and Fentanyl   Social History:   TOBACCO:   reports that she has never smoked. She has never used smokeless tobacco.  All smokers should join the free smoking cessation program and stop smoking before having surgery. ETOH:    reports current alcohol use. Family History   Problem Relation Age of Onset    Rheumatologic Disease Mother         Connective tissue disease     Review of Systems:  Psychiatric:  depression and anxiety  Respiratory: negative  Cardiovascular: negative  Gastrointestinal: negative  Musculoskeletal:negative  All others reviewed, negative    Physical Exam:   VITALS: Blood pressure 127/73, pulse 91, temperature 97.3 °F (36.3 °C), temperature source Temporal, resp. rate 20, height 5' 5\" (1.651 m), weight 159 lb (72.1 kg). General appearance: alert, appears stated age and cooperative, does ambulate easily  Head: Normocephalic, without obvious abnormality, atraumatic  Eyes: PERRL  Ears/mouth/throat:  Ears clear, mouth normal, throat no redness  Neck: no adenopathy, no JVD, supple, symmetrical, trachea midline and thyroid not enlarged  Lungs: clear to auscultation bilaterally  Heart: regular rate and rhythm  Abdomen: soft, non-tender; bowel sounds normal; no masses,  no organomegaly  Extremities: extremities normal, atraumatic, no cyanosis or edema  Skin: no open wounds    Assessment: Post Edwin-en- Y Gastric Bypass.   She does not complain of GERD, uses Pepcid every other day for ulcer prophylaxis,  does not have sleep apnea,  does not have diabetes,  does not have hypertension. Cholesterol 218, triglycerides 97. Some constipation. Plan:  Continue to eat a high protein, low calorie diet, eat small portions very slowly and chew well before swallowing. Drink plenty of water and fluids. Make sure to use fiber to keep the bowels regular. Try to exercise 7 days per week. Always notify the clinic if you have any medical problems. Follow up in 12 months.       Physician Signature: Electronically signed by Dr. Mandy Rocha MD

## 2020-10-23 ENCOUNTER — TREATMENT (OUTPATIENT)
Dept: OCCUPATIONAL THERAPY | Age: 60
End: 2020-10-23
Payer: MEDICARE

## 2020-10-23 PROCEDURE — 97140 MANUAL THERAPY 1/> REGIONS: CPT | Performed by: OCCUPATIONAL THERAPIST

## 2020-10-23 PROCEDURE — 97022 WHIRLPOOL THERAPY: CPT | Performed by: OCCUPATIONAL THERAPIST

## 2020-10-23 PROCEDURE — 97110 THERAPEUTIC EXERCISES: CPT | Performed by: OCCUPATIONAL THERAPIST

## 2020-10-23 NOTE — PROGRESS NOTES
OCCUPATIONAL THERAPY PROGRESS NOTE    Date:  10/23/2020    Initial Evaluation Date: 2020                          Evaluating Therapist: Elisa Burnett     Patient Name:  Saurav Yu                :  1960     Restrictions/Precautions:  OT Eval & treat, low fall risk  Diagnosis: L CMC Arthroplasty                                                           Insurance/Certification information: [facility] Medicare  Referring Practitioner:  Dr. Ximena Gaona  Date of Surgery/Injury: 2020   (10 weeks)  Plan of care signed (Y/N): N  Visit# / total visits:     Pain Level: 1-2 on scale of 1-10 at rest but 3/10 with movement, aching    Subjective: Pt states \"My wrist really hurts and is reggie stiff\"     Objective:  Updated POC to be completed by 10 th visit. COVID-19 Screening completed upon entering facility and patient cleared for treatment today. Pt followed all protocols set forth by [address] [place] [address] for Outpatient services throughout therapy session. Patient has been made aware of all new hygiene protocols due to COVID-19 set forth by [address] [place] [address] Outpatient services and agrees to abide by all protocols. INTERVENTION: COMPLETED: SPECIFICS/COMMENTS:   Modality:     MHP  To L thumb at start of session 10 min   Fluiotherapy x For soft tissue warm-up while moving her fingers intermittently. AROM:     MCP & IP blocking  Initiated 2 sets x10 each joint blocking at MCP & IP with good tolerance. Towel scrutch     All fingers    Wrist flex/ext     no ^ in pain. Wrist er ciser 5 min. 2.5 mins with elbow off table, 2.5 mins with elbow on table for wrist isolation         Chinese balls   5 min. Both directions. occ v/c'es to use her thumb. Yellow putty  Manipulation only - squeeze with fingers only. V/c'es to use her thumb to move putty around. Foam 1\" pegs   Push in and out - 2 sets. Slight ^ in pain. Tendon gliding  Williams well        Fine Motor:      In hand to reduce stiffness and discomfort in the wrist. Added 3 point pinch with yellow and red clips to increase strength with functional task of handing pants no hangers with clips. Pt tolerated well and demonstrated increased endurance with resistive pinching activities. Pt is progressing towards all established goals at this time.    -Rehab Potential: Good  -Requires OT Follow Up: Yes  Time In:  10:00 am            Time Out:  11:00 am             Visit #: 14    Treatment Charges: Mins Units   Modalities-fluioth 15 1   Ther Exercise 25 2   Manual Therapy 20 1   Thera Activities     ADL/Home Mgt      Neuro Re-education     Gait Training     Group Therapy     Non-Billable Service Time-MHP     Other     Total Time/Units 60 4       -Response to Treatment: good, patient tolerated activities well and increased endurance to strengthening activities. Goals: Goals for pt can be see on initial eval occurring on 07/29/2020    Plan:   [x]  Continues Plan of care: Treatment covered based on POC and graduated to patient's progress. Pt education continues at each visit to obtain maximum benefits from skilled OT intervention.   []  Alter Plan of care:   []  Discharge:      Fatmata Christopher OT/L , [employer]  OT [address] [place], [place] #[id number]

## 2020-11-03 ENCOUNTER — TREATMENT (OUTPATIENT)
Dept: OCCUPATIONAL THERAPY | Age: 60
End: 2020-11-03
Payer: MEDICARE

## 2020-11-03 PROCEDURE — 97110 THERAPEUTIC EXERCISES: CPT | Performed by: OCCUPATIONAL THERAPIST

## 2020-11-03 PROCEDURE — 97140 MANUAL THERAPY 1/> REGIONS: CPT | Performed by: OCCUPATIONAL THERAPIST

## 2020-11-03 PROCEDURE — 97022 WHIRLPOOL THERAPY: CPT | Performed by: OCCUPATIONAL THERAPIST

## 2020-11-03 NOTE — PROGRESS NOTES
OCCUPATIONAL THERAPY PROGRESS NOTE    Date:  11/3/2020    Initial Evaluation Date: 2020                          Evaluating Therapist: Abel Bernheim     Patient Name:  Coy Brock                :  1960     Restrictions/Precautions:  OT Eval & treat, low fall risk  Diagnosis: L CMC Arthroplasty                                                           Insurance/Certification information: [facility] Medicare  Referring Practitioner:  Dr. Evon Gordon  Date of Surgery/Injury: 2020   (10 weeks)  Plan of care signed (Y/N): N  Visit# / total visits: 15/ 18    Pain Level: 1-2 on scale of 1-10 at rest but 3/10 with movement, aching    Subjective: Pt states \"The back of my hand was swollen yesterday - I don't know why. \"     Objective:  Updated POC to be completed by last visit. COVID-19 Screening completed upon entering facility and patient cleared for treatment today. Pt followed all protocols set forth by [facility] for Outpatient services throughout therapy session. Patient has been made aware of all new hygiene protocols due to COVID-19 set forth by [facility] Outpatient services and agrees to abide by all protocols. INTERVENTION: COMPLETED: SPECIFICS/COMMENTS:   Modality:     MHP  To L thumb at start of session 10 min   Fluiotherapy x For soft tissue warm-up while moving her fingers intermittently. AROM:     MCP & IP blocking  Initiated 2 sets x10 each joint blocking at MCP & IP with good tolerance. Towel scrutch     All fingers    Wrist flex/ext     no ^ in pain. Wrist er ciser 5 min. 2.5 mins with elbow off table, 2.5 mins with elbow on table for wrist isolation         Chinese balls   5 min. Both directions. occ v/c'es to use her thumb. Yellow putty  Manipulation only - squeeze with fingers only. V/c'es to use her thumb to move putty around. Foam 1\" pegs   Push in and out - 2 sets. Slight ^ in pain.     Tendon gliding  Williams well        Fine motion to reduce stiffness and discomfort in the wrist.   Pt is progressing towards all established goals at this time. Discussed possible D/C this week 2* to pt doing very well and is Independent with her HEP. Pt will be discharged this Friday - her 16th session.   -Rehab Potential: Good  -Requires OT Follow Up: Yes  Time In:   8:00 am            Time Out:  9 :00 am             Visit #: 15    Treatment Charges: Mins Units   Modalities-fluioth 15 1   Ther Exercise 25 2   Manual Therapy 20 1   Thera Activities     ADL/Home Mgt      Neuro Re-education     Gait Training     Group Therapy     Non-Billable Service Time-[facility]     Other     Total Time/Units 60 4       -Response to Treatment: good, patient tolerated activities well and increased endurance to strengthening activities. Goals: Goals for pt can be see on initial eval occurring on 07/29/2020    Plan:   [x]  Continues Plan of care: Prepare for D/C this Friday. Treatment covered based on POC and graduated to patient's progress. Pt education continues at each visit to obtain maximum benefits from skilled OT intervention.   []  Alter Plan of care:   []  Discharge:      Noy Lopes OT/L , CHT

## 2020-11-04 ENCOUNTER — OFFICE VISIT (OUTPATIENT)
Dept: BARIATRICS/WEIGHT MGMT | Age: 60
End: 2020-11-04
Payer: MEDICARE

## 2020-11-04 VITALS
HEIGHT: 65 IN | BODY MASS INDEX: 27.29 KG/M2 | TEMPERATURE: 97.1 F | SYSTOLIC BLOOD PRESSURE: 123 MMHG | HEART RATE: 92 BPM | WEIGHT: 163.8 LBS | DIASTOLIC BLOOD PRESSURE: 64 MMHG

## 2020-11-04 PROCEDURE — 99201 HC NEW PT, E/M LEVEL 1: CPT

## 2020-11-04 PROCEDURE — 99204 OFFICE O/P NEW MOD 45 MIN: CPT | Performed by: INTERNAL MEDICINE

## 2020-11-04 PROCEDURE — 99201 HC NEW PT, E/M LEVEL 1: CPT | Performed by: INTERNAL MEDICINE

## 2020-11-04 RX ORDER — LEVOTHYROXINE SODIUM 50 UG/1
TABLET ORAL
Qty: 78 TABLET | Refills: 3 | Status: SHIPPED
Start: 2020-11-04 | End: 2021-05-21 | Stop reason: SDUPTHER

## 2020-11-04 NOTE — PROGRESS NOTES
for Wheezing      diclofenac sodium 1 % GEL Apply 2 g topically 2 times daily      ipratropium (ATROVENT) 0.03 % nasal spray 2 sprays by Nasal route every 12 hours      famotidine (PEPCID) 20 MG tablet Take 20 mg by mouth once      loteprednol (ALREX) 0.2 % SUSP 1 drop 4 times daily      bisacodyl (DULCOLAX) 5 MG EC tablet Take 5 mg by mouth daily as needed for Constipation      guaiFENesin (MUCINEX) 600 MG extended release tablet Take 600 mg by mouth as needed       Probiotic Product (PROBIOTIC DAILY PO) Take by mouth nightly      Ascorbic Acid (VITAMIN C) 250 MG tablet Take 250 mg by mouth daily      topiramate (TOPAMAX) 25 MG tablet Take 25 mg by mouth 2 times daily       Wheat Dextrin (BENEFIBER) POWD Take by mouth Daily 3 Tablespoons      folic acid (FOLVITE) 1 MG tablet Take 800 mcg by mouth daily       SUMAtriptan (IMITREX) 100 MG tablet Take 100 mg by mouth once as needed for Migraine      diazepam (VALIUM) 5 MG tablet Take 0.5 tablets by mouth nightly       montelukast (SINGULAIR) 10 MG tablet Take 1 tablet by mouth daily      amphetamine-dextroamphetamine (ADDERALL, 20MG,) 20 MG tablet Take 20 mg by mouth daily .  rosuvastatin (CRESTOR) 5 MG tablet Take 5 mg by mouth every other day       diphenhydrAMINE (BENADRYL) 25 MG tablet Take 25 mg by mouth nightly.  estradiol (ESTRACE VAGINAL) 0.1 MG/GM vaginal cream Place 2 g vaginally once a week.  cycloSPORINE (RESTASIS) 0.05 % ophthalmic emulsion Place 1 drop into both eyes 2 times daily.  amitriptyline (ELAVIL) 10 MG tablet Take 10 mg by mouth nightly.  baclofen (LIORESAL) 20 MG tablet Take 20 mg by mouth 2 times daily       Docusate Calcium (STOOL SOFTENER PO) Take 1 capsule by mouth 3 times daily.       vitamin D (ERGOCALCIFEROL) 67832 UNIT CAPS capsule Take 50,000 Units by mouth once a week       Calcium Citrate-Vitamin D 200-250 MG-UNIT TABS Take 1 tablet by mouth 2 times daily Gummies      therapeutic multivitamin-minerals (THERAGRAN-M) tablet Take 1 tablet by mouth 2 times daily Gummies      Ferrous Fumarate (IRON) 18 MG TBCR Take 2 tablets by mouth daily       Coenzyme Q-10 100 MG CAPS Take 1 capsule by mouth daily       Magnesium 250 MG TABS Take 500 mg by mouth every evening        No current facility-administered medications for this visit. ROS -  Card - no CP  GI - no N/V/D    PE -  Gen : /64 (Site: Left Upper Arm, Position: Sitting, Cuff Size: Medium Adult)   Pulse 92   Temp 97.1 °F (36.2 °C) (Temporal)   Ht 5' 4.75\" (1.645 m)   Wt 163 lb 12.8 oz (74.3 kg)   BMI 27.47 kg/m²    WN, WD, NAD  Lung: Nml resp effort  Psych: Normal mood   Full affect  Neuro: Moves all ext well  ______________________      HPI & A/P -   Problem 1  - Hypothyroidism  HPI   - See above Background for description      Target TSH for her age and osteopenia is 4.5-5.0      Will decrease LT4      Symptoms: + chronic constipation, no diarrhea, sensitive to heat, + palpitations, no tremors, no sweating, + fatigue  Assessment  - Controlled, but need to reduce dose to decrease risk of bone loss  Plan   - Decrease LT4 to 50 mcg daily M-Sa, none on [place]      Take by itself with water only first thing in the morning before taking anything else      Check TSH in six weeks    Problem 2  - Osteopenia  HPI   - See above Background for description    12/14/28 DEXA  T-scores:  LFN -1.4,  RFN -1.7,  L1-4 +0.1, L1 -0.5    10 yr Fraxx Risk 8.8% Major, 0.9% Hip  Assessment  - Controlled  Plan   - Cont with Ca (1500 mg/day) and D3 (5,000 IU daily) supplements    I spent 45 minutes in a face to face encounter with Jacob Polo today.    >30 minutes of this was in education and counseling regarding management of hypothyroidism, treatment goal and osteopenia management    Follow up  Return to see me in 6 months    Ger Dave MD  Endocrinology/Obesity  11/4/20

## 2020-11-04 NOTE — PATIENT INSTRUCTIONS
Decrease LT4 to 50 mcg daily M-Sa, none on [place]  Take by itself with water only first thing in the morning before taking anything else    Cont with Ca (1500 mg/day, as Calcium Citrate or Calcium Phosphate) and D3 (5,000 IU daily) supplements    See me back in six months  Have a TSH drawn in six weeks

## 2020-11-06 ENCOUNTER — TREATMENT (OUTPATIENT)
Dept: OCCUPATIONAL THERAPY | Age: 60
End: 2020-11-06
Payer: MEDICARE

## 2020-11-06 PROCEDURE — 97140 MANUAL THERAPY 1/> REGIONS: CPT | Performed by: OCCUPATIONAL THERAPIST

## 2020-11-06 PROCEDURE — 97110 THERAPEUTIC EXERCISES: CPT | Performed by: OCCUPATIONAL THERAPIST

## 2020-11-06 PROCEDURE — 97530 THERAPEUTIC ACTIVITIES: CPT | Performed by: OCCUPATIONAL THERAPIST

## 2020-11-06 NOTE — PROGRESS NOTES
OCCUPATIONAL THERAPY PROGRESS NOTE    DISCHARGE SUMMARY    Date:  2020    Initial Evaluation Date: 2020                          Evaluating Therapist: Bryan Kaplan     Patient Name:  Brendon Will                :  1960     Restrictions/Precautions:  OT Eval & treat, low fall risk  Diagnosis: L CMC Arthroplasty                                                           Insurance/Certification information: tna Medicare  Referring Practitioner:  Dr. Aura Schmitz  Date of Surgery/Injury: 2020   (10 weeks)  Plan of care signed (Y/N): N  Visit# / total visits:     Pain Level: 1-2 on scale of 1-10 at rest but 3/10 with movement, aching    Subjective: Pt states \"The back of my hand was swollen yesterday - I don't know why. \"     Objective:  Updated POC to be completed by last visit. COVID-19 Screening completed upon entering facility and patient cleared for treatment today. Pt followed all protocols set forth by [facility] for Outpatient services throughout therapy session. Patient has been made aware of all new hygiene protocols due to COVID-19 set forth by [facility] Outpatient services and agrees to abide by all protocols. INTERVENTION: COMPLETED: SPECIFICS/COMMENTS:   Modality:     MHP  To L thumb at start of session 10 min   Fluiotherapy  For soft tissue warm-up while moving her fingers intermittently. AROM:     MCP & IP blocking  Initiated 2 sets x10 each joint blocking at MCP & IP with good tolerance. Towel scrutch     All fingers    Wrist flex/ext     no ^ in pain. Wrist er ciser 5 min. 2.5 mins with elbow off table, 2.5 mins with elbow on table for wrist isolation         Chinese balls   5 min. Both directions. occ v/c'es to use her thumb. Yellow putty  Manipulation only - squeeze with fingers only. V/c'es to use her thumb to move putty around. Foam 1\" pegs   Push in and out - 2 sets. Slight ^ in pain.     Tendon gliding Grecia well        Fine Motor: In hand manipulation               Marbles,  5 at a time one at a time and place in a container one at a time    Opposition activity/ lateral pinch           x Opposition activity with small pegs - no pain today to SF- . Also beading with the string. AAROM:               PROM/Stretching:     Blue roller   Wrist ext and wrist flexion stretch - 5 sec holds end range. Thumb IP and MP jt and CMC jt. x Before doing AROM exercises. Wrist flex, extension x Slight PROM to flexion and extension of the L Wrist   Scar Mass/Edema Control:     Retrograde massage  Completed to Thumb and forearm   Scar massage x All scar areas. grecia well. Soft tissue massage x To the wrist and thumb to decrease pt stated tightness and soreness with motion   Deep tendon massage - 15 min To IF flexor tendon cord and web of thumb - beginning and end of session. Strengthening:     Yellow putty x Gripping 3 min, roll and 3 pt pinch 2 sets. Palmar and radial abduction pinches, tolerated well. 2# wt x Wrist and forearm all planes from 3x10 reps   1# wt  Wrist and forearm all planes from 2x10 reps    Bead activity  Using a medium putty pinching out beads from putty and placing into container. Small pegs and peg board   20 pegs in yellow putty and placed in peg board   3 point pinch x 2x25 with red and blue clips   therabar- Yellow x x20 supination, pronation and twisting   Other:     Intrinsic muscle exercises  Issued to assist with intrinsic tightness and completed for pt with exercises issued for HEP   Splint adjustment  Splint was adjusted to more comfortable fit for the patient. HEP x Cont with digital tendon gliding, blocked thumb IP and MP flex/ext, flexion and extension of the wrist RD/UD and CMC jt all planes, scar massage and putty. reviewed all this date. Assessment/Comments: Pt is making Fair progress toward stated plan of care.  Performed soft tissue massage to the wrist and thumb while performing PROM in all planes of motion to reduce stiffness and discomfort in the wrist.   Pt Independent with all her home exercises. L  Hand ROM  Edema AROM [x]? ?         PROM[]?? IE  09/18/2020 11/6/20      THUMB  6.8 cm MP Flexion/Extension 0-50*/ 14-23* H TBA 50*  50*      7.2 cm IP Flexion/Extension 0-80*/ 23-30* H 31*  54* 54*        Radial Abduction 53-71* TBA  40* 60*        Palmar Abduction 50-71* TBA 40*   50*       Left  Wrist  AROM:                       8/21/2020 11/6/2020  Wrist Flexion 0-80:                              69*                  73*  Wrist Extension 0-70:                         50*                   59*  Wrist Radial Deviation 0-20:               15*                   24*   Wrist Ulnar Deviation 0-45:                 18*                   44*    Dynamometer (setting 2):                                                                  Left:   35 #                  42#                                                                             Right: 43#                   45#                 Pinch Meter:                Lateral: Left= 5# - 6# with no pain              Right= 7#                 Palmar 3 point: Left=7# - 8#                        Right=9#     9 Hole Peg Test:              Left: 24 seconds  -  :21              Right: 20 seconds  - :18     QuickDASH Score: 34.1% disability reported   Updated QuickDASH Score  D/C : 18 % disability reported    -Rehab Potential: Good  -Requires OT Follow Up: Yes  Time In:   10:00 am            Time Out:  11 :00 am             Visit #: 16    Treatment Charges: Mins Units   Modalities-fluioth     Ther Exercise 30 2   Manual Therapy 15 1   Thera Activities 15 1   ADL/Home Mgt      Neuro Re-education     Gait Training     Group Therapy     Non-Billable Service Time-[facility]     Other     Total Time/Units 60 4       -Response to Treatment: good, patient tolerated activities well and increased endurance to strengthening activities. Pt is ready for D/C. Goals: Goals for pt can be see on initial eval occurring on 07/29/2020, 9/18/2020 and this date. PLAN      Treatment did include:   [x]? Instruction in HEP                   Modalities:  [x]?  Therapeutic Exercise                 [x]? Ultrasound               []? Electrical Stimulation/Attended  [x]? PROM/Stretching                    [x]? Fluidotherapy          []?  Paraffin                   [x]? AAROM  [x]?  AROM                 []? Iontophoresis: 4 mg/mL; Dexamethasone Sodium                                        []? Neuromuscular Re-education [x]? Splinting         [x]? Desensitization          [x]?  Therapeutic Activity                  [x]? Pain Management with/without modalities PRN                 [x]?  Manual Therapy/Fascial release                                         [x]? Tendon Glides                          []?Joint Protection/Training  []? Ergonomics                             [x]?  Joint Mobilization                      []? Adaptive Equipment Assessment/Training                             [x]?  Manual Edema Mobilization    []? Energy Conservation/Work Simplification  [x]? GM/FM Coordination                [x]? Safety retraining/education per  individual diagnosis/goals  [x]? Scar Management                   [x]? ADL/IADL re-training                                 GOALS (Long term same as Short term):  1) Patient will demonstrate good understanding of home program (exercises/activities/diagnosis/prognosis/goals) with good accuracy. Goal Met. 2) Patient will demonstrate increased active/passive range of motion of their LUE to [facility] for ADL/IADL completion. Goal Met. 3) Patient will demonstrate increased /pinch strength of at least 10 / 5 pinch pounds of their L hand. Goal MEt.   4) Patient to report decreased pain in their affected L upper extremity from 7/10 to 2/10 or less with resistive functional use. Goal Met.   5) Increase in fine motor function as evidenced by decreased time to complete 9-hole peg test and/or MRMT test by at least 5-10 seconds. Goal Met. 6) Patient to report 100% compliance with their splint wear, care, and precautions if needed. Goal MEt. Splint d/c'ed. 7) Patient to report a decrease in hypersensitivity from 80% to 20% or less in their LUE. Goal Met. 8) Patient to demonstrate decreased guarding of their affected extremity from 100% to 20% or less. Goal MEt.   9) Patient will be knowledgeable of edema control techniques as evident with decreases from moderate to mild/none. Goal MEt.   10) Patient will demonstrate a non-tender/non-adherent scar. Goal Met.    11) Patient will report ADL functions as Mod I/I using LUE. Goal Met.   12) Patient will demonstrate improved functional activity tolerance from fair to good for ADL/IADL completion. Goal Met.   13) Patient will decrease QuickDASH score by 25% for increased participation in daily functional .  Goal MEt. Plan:   []  Continues Plan of care: Prepare for D/C this Friday. Treatment covered based on POC and graduated to patient's progress. Pt education continues at each visit to obtain maximum benefits from skilled OT intervention.   []  Alter Plan of care:   [x]  Discharge:  With [address] OT/L , CHT

## 2020-12-29 ENCOUNTER — HOSPITAL ENCOUNTER (OUTPATIENT)
Dept: MAMMOGRAPHY | Age: 60
Discharge: HOME | End: 2020-12-31
Payer: MEDICARE

## 2020-12-29 DIAGNOSIS — Z12.31 ENCOUNTER FOR SCREENING MAMMOGRAM FOR MALIGNANT NEOPLASM OF BREAST: ICD-10-CM

## 2020-12-29 PROCEDURE — 77063 BREAST TOMOSYNTHESIS BI: CPT

## 2021-04-21 ENCOUNTER — TELEPHONE (OUTPATIENT)
Dept: BARIATRICS/WEIGHT MGMT | Age: 61
End: 2021-04-21

## 2021-05-21 ENCOUNTER — OFFICE VISIT (OUTPATIENT)
Dept: BARIATRICS/WEIGHT MGMT | Age: 61
End: 2021-05-21
Payer: MEDICARE

## 2021-05-21 VITALS
HEIGHT: 65 IN | WEIGHT: 159.6 LBS | TEMPERATURE: 97.3 F | SYSTOLIC BLOOD PRESSURE: 120 MMHG | DIASTOLIC BLOOD PRESSURE: 70 MMHG | BODY MASS INDEX: 26.59 KG/M2 | HEART RATE: 81 BPM

## 2021-05-21 DIAGNOSIS — E03.8 HYPOTHYROIDISM DUE TO HASHIMOTO'S THYROIDITIS: Primary | ICD-10-CM

## 2021-05-21 DIAGNOSIS — E06.3 HYPOTHYROIDISM DUE TO HASHIMOTO'S THYROIDITIS: Primary | ICD-10-CM

## 2021-05-21 PROCEDURE — 99214 OFFICE O/P EST MOD 30 MIN: CPT | Performed by: INTERNAL MEDICINE

## 2021-05-21 PROCEDURE — 99211 OFF/OP EST MAY X REQ PHY/QHP: CPT

## 2021-05-21 RX ORDER — LEVOTHYROXINE SODIUM 50 UG/1
TABLET ORAL
Qty: 65 TABLET | Refills: 3
Start: 2021-05-21 | End: 2021-08-03

## 2021-05-21 NOTE — PROGRESS NOTES
CC -   Hypothyroidism, Hypoparathyroidism    Background -   Last visit:  05/21/21  First visit:   11/4/20    Hypothyroidism  Since 2010  Due to Hashimoto's   Severe - requires full replacement  Likes Levoxyl b/c of belief that it is better absorbed in pts with RYGB (told this by her PCP)  No arrhythmias  + Osteopenia  Has been on Fosamax for three years in the past  Takes her LT4 in the evening    9/21/20 TSH 3.060 on LT4 50mcg daily, increased to 75 mcg daily by PCP  11/04/20                                                    dcrs LT4 to 50 mcg daily M-Sa, none Blanchard  12/29/20 TSH 2.100 on LT4 50 mcg daily M-Sa, none Blanchard    ______________________    [facility] -  Past Medical History:   Diagnosis Date    Acid reflux     Arthritis     Asthma     Clotting disorder ([facility]) 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, NEAL-1 genotype Heterozygote 5G/4G    CRPS (complex regional pain syndrome type I)     Gastric bypass status for obesity     Heart palpitations     History of constipation     History of phlebitis     Hyperlipidemia     Hypothyroidism     Lower leg DVT (deep venous thrombosis) ([facility])     Migraines     Varicose veins      Current Outpatient Medications   Medication Sig Dispense Refill    LEVOXYL 50 MCG tablet Take one tablet daily M-Sa, none on Blanchard 78 tablet 3    Zinc Gluconate 15 MG TABS Take one tablet daily 90 tablet 3    fexofenadine-pseudoephedrine (ALLEGRA-D ALLERGY & CONGESTION) 180-240 MG per extended release tablet Take 1 tablet by mouth daily      azelastine (ASTELIN) 0.1 % nasal spray       metroNIDAZOLE (METROGEL) 1 % gel       ondansetron (ZOFRAN) 4 MG tablet TAKE ONE TABLET BY MOUTH EVERY 6 TO 8 HOURS AS NEEDED FOR NAUSEA.  tretinoin microspheres (RETIN-A MICRO) 0.04 % gel       zolpidem (AMBIEN) 5 MG tablet Take 5 mg by mouth nightly.   0    clindamycin-benzoyl peroxide (BENZACLIN) 1-5 % gel       DULoxetine (CYMBALTA) 60 MG extended release capsule Take 60 mg by mouth daily  2    promethazine (PHENERGAN) 25 MG tablet as needed  1    albuterol sulfate  (90 Base) MCG/ACT inhaler Inhale 2 puffs into the lungs every 6 hours as needed for Wheezing      diclofenac sodium 1 % GEL Apply 2 g topically 2 times daily      ipratropium (ATROVENT) 0.03 % nasal spray 2 sprays by Nasal route every 12 hours      famotidine (PEPCID) 20 MG tablet Take 20 mg by mouth once      loteprednol (ALREX) 0.2 % SUSP 1 drop 4 times daily      bisacodyl (DULCOLAX) 5 MG EC tablet Take 5 mg by mouth daily as needed for Constipation      guaiFENesin (MUCINEX) 600 MG extended release tablet Take 600 mg by mouth as needed       Probiotic Product (PROBIOTIC DAILY PO) Take by mouth nightly      Ascorbic Acid (VITAMIN C) 250 MG tablet Take 250 mg by mouth daily      topiramate (TOPAMAX) 25 MG tablet Take 25 mg by mouth 2 times daily       Wheat Dextrin (BENEFIBER) POWD Take by mouth Daily 3 Tablespoons      folic acid (FOLVITE) 1 MG tablet Take 800 mcg by mouth daily       SUMAtriptan (IMITREX) 100 MG tablet Take 100 mg by mouth once as needed for Migraine      diazepam (VALIUM) 5 MG tablet Take 0.5 tablets by mouth nightly       montelukast (SINGULAIR) 10 MG tablet Take 1 tablet by mouth daily      amphetamine-dextroamphetamine (ADDERALL, 20MG,) 20 MG tablet Take 20 mg by mouth daily .  rosuvastatin (CRESTOR) 5 MG tablet Take 5 mg by mouth every other day       diphenhydrAMINE (BENADRYL) 25 MG tablet Take 25 mg by mouth nightly.  estradiol (ESTRACE VAGINAL) 0.1 MG/GM vaginal cream Place 2 g vaginally once a week.  cycloSPORINE (RESTASIS) 0.05 % ophthalmic emulsion Place 1 drop into both eyes 2 times daily.  amitriptyline (ELAVIL) 10 MG tablet Take 10 mg by mouth nightly.  baclofen (LIORESAL) 20 MG tablet Take 20 mg by mouth 2 times daily       Docusate Calcium (STOOL SOFTENER PO) Take 1 capsule by mouth 3 times daily.       vitamin D (ERGOCALCIFEROL) 28998 UNIT CAPS capsule Take 50,000 Units by mouth once a week       Calcium Citrate-Vitamin D 200-250 MG-UNIT TABS Take 1 tablet by mouth 2 times daily Gummies      therapeutic multivitamin-minerals (THERAGRAN-M) tablet Take 1 tablet by mouth 2 times daily Gummies      Ferrous Fumarate (IRON) 18 MG TBCR Take 2 tablets by mouth daily       Coenzyme Q-10 100 MG CAPS Take 1 capsule by mouth daily       Magnesium 250 MG TABS Take 500 mg by mouth every evening        No current facility-administered medications for this visit. PE -  Gen : /70 (Site: Left Upper Arm, Position: Sitting, Cuff Size: Medium Adult)   Pulse 81   Temp 97.3 °F (36.3 °C) (Temporal)   Ht 5' 4.75\" (1.645 m)   Wt 159 lb 9.6 oz (72.4 kg)   BMI 26.76 kg/m²    WN, WD, NAD  Lung: Nml resp effort  Psych: Normal mood   Full affect  Neuro:  Moves all ext well  ______________________      HPI & A/P -   Problem 1  - Hypothyroidism  HPI   - See above Background for description      Target TSH for her age and osteopenia is 4.5-5.0      12/29/20 TSH 2.100 on LT4 50 mcg daily M-Sa, none Blanchard      Symptoms: + chronic constipation, no diarrhea, sensitive to heat, + palpitations episodes less than one min and are infreq), no tremors, no sweating, + fatigue  Assessment  - Controlled, but need to reduce dose further to decrease risk of bone loss  Plan   - Decrease LT4 to 50 mcg daily M-F, none on Sa or Blanchard      Take by itself with water only first thing in the morning before taking anything else      Check TSH in two months    Problem 2  - Osteopenia  HPI   - See above Background for description    12/14/18 DEXA  T-scores:  LFN -1.4,  RFN -1.7,  L1-4 +0.1, L1 -0.5    10 yr Fraxx Risk 8.8% Major, 0.9% Hip    Has chose to take D3 50k weekly rather than 5k daily  Assessment  - Controlled  Plan   - Cont with Ca (1500 mg/day) and D3 50,000 IU weekly supplements    Follow up  Return to see me in 6 months    Joyice Koyanagi, MD, MD  Endocrinology/Obesity  5/21/21

## 2021-07-02 ENCOUNTER — HOSPITAL ENCOUNTER (OUTPATIENT)
Dept: GENERAL RADIOLOGY | Age: 61
Discharge: HOME | End: 2021-07-04
Payer: MEDICARE

## 2021-07-02 ENCOUNTER — HOSPITAL ENCOUNTER (OUTPATIENT)
Age: 61
Discharge: HOME | End: 2021-07-04
Payer: MEDICARE

## 2021-07-02 DIAGNOSIS — M79.641 BILATERAL HAND PAIN: ICD-10-CM

## 2021-07-02 DIAGNOSIS — M79.642 BILATERAL HAND PAIN: ICD-10-CM

## 2021-07-02 PROCEDURE — 73130 X-RAY EXAM OF HAND: CPT

## 2021-08-13 ENCOUNTER — OFFICE VISIT (OUTPATIENT)
Dept: BARIATRICS/WEIGHT MGMT | Age: 61
End: 2021-08-13
Payer: MEDICARE

## 2021-08-13 VITALS
BODY MASS INDEX: 27.02 KG/M2 | WEIGHT: 162.2 LBS | HEART RATE: 82 BPM | TEMPERATURE: 97 F | HEIGHT: 65 IN | DIASTOLIC BLOOD PRESSURE: 76 MMHG | SYSTOLIC BLOOD PRESSURE: 118 MMHG

## 2021-08-13 DIAGNOSIS — M85.852 OSTEOPENIA OF NECKS OF BOTH FEMURS: ICD-10-CM

## 2021-08-13 DIAGNOSIS — E03.8 HYPOTHYROIDISM DUE TO HASHIMOTO'S THYROIDITIS: Primary | ICD-10-CM

## 2021-08-13 DIAGNOSIS — E06.3 HYPOTHYROIDISM DUE TO HASHIMOTO'S THYROIDITIS: Primary | ICD-10-CM

## 2021-08-13 DIAGNOSIS — M85.851 OSTEOPENIA OF NECKS OF BOTH FEMURS: ICD-10-CM

## 2021-08-13 PROCEDURE — 99202 OFFICE O/P NEW SF 15 MIN: CPT

## 2021-08-13 PROCEDURE — 99214 OFFICE O/P EST MOD 30 MIN: CPT | Performed by: INTERNAL MEDICINE

## 2021-08-13 RX ORDER — LEVOTHYROXINE SODIUM 25 UG/1
TABLET ORAL
Qty: 116 TABLET | Refills: 3 | Status: SHIPPED
Start: 2021-08-13 | End: 2021-11-18 | Stop reason: SDUPTHER

## 2021-08-13 NOTE — PROGRESS NOTES
CC -   Hypothyroidism, Hypoparathyroidism    Background -   Last visit:   05/21/21  First visit:   11/04/20    · Hypothyroidism  Diagnosed 2010  Due to Hashimoto's   Prefers Levoxyl b/c of belief that it is better absorbed in pts with RYGB (told this by her PCP)  No arrhythmias  + Osteopenia  Has been on Fosamax for three years in the past  Takes her LT4 in the evening    9/21/20 TSH 3.060 on LT4 50mcg daily, increased to 75 mcg daily by PCP  11/04/20                                                    dcrs LT4 to 50 mcg daily M-Sa, none Blanchard  12/29/20 TSH 2.100 on LT4 50 mcg daily M-Sa, none Blanchard, on 5/21/21 decr to one tab M-F, no Sa or Blanchard  06/28/21 TSH 3.010 on LT4 50 mcg daily M-F, none Sa or Blanchard  08/04/21 TSH 1.830 on LT4 50 mcg daily M-F, none Sa or Blanchard  ______________________    [facility] -  Past Medical History:   Diagnosis Date    Acid reflux     Arthritis     Asthma     Clotting disorder ([facility] [address] 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, NEAL-1 genotype Heterozygote 5G/4G    CRPS (complex regional pain syndrome type I)     Gastric bypass status for obesity     Heart palpitations     History of constipation     History of phlebitis     Hyperlipidemia     Hypothyroidism     Lower leg DVT (deep venous thrombosis) ([facility])     Migraines     Varicose veins      Current Outpatient Medications   Medication Sig Dispense Refill    LEVOXYL 50 MCG tablet TAKE 1 TABLET DAILY MONDAY THROUGH Friday, NONE ON SATURDAY OR SUNDAY 65 tablet 3    Zinc Gluconate 15 MG TABS Take one tablet daily 90 tablet 3    fexofenadine-pseudoephedrine (ALLEGRA-D ALLERGY & CONGESTION) 180-240 MG per extended release tablet Take 1 tablet by mouth daily      azelastine (ASTELIN) 0.1 % nasal spray       metroNIDAZOLE (METROGEL) 1 % gel       ondansetron (ZOFRAN) 4 MG tablet TAKE ONE TABLET BY MOUTH EVERY 6 TO 8 HOURS AS NEEDED FOR NAUSEA.       tretinoin microspheres (RETIN-A MICRO) 0.04 % gel       zolpidem (AMBIEN) 5 MG tablet Take 5 mg by mouth nightly. 0    clindamycin-benzoyl peroxide (BENZACLIN) 1-5 % gel       DULoxetine (CYMBALTA) 60 MG extended release capsule Take 60 mg by mouth daily  2    promethazine (PHENERGAN) 25 MG tablet as needed  1    albuterol sulfate  (90 Base) MCG/ACT inhaler Inhale 2 puffs into the lungs every 6 hours as needed for Wheezing      diclofenac sodium 1 % GEL Apply 2 g topically 2 times daily      ipratropium (ATROVENT) 0.03 % nasal spray 2 sprays by Nasal route every 12 hours      famotidine (PEPCID) 20 MG tablet Take 20 mg by mouth once      loteprednol (ALREX) 0.2 % SUSP 1 drop 4 times daily      bisacodyl (DULCOLAX) 5 MG EC tablet Take 5 mg by mouth daily as needed for Constipation      guaiFENesin (MUCINEX) 600 MG extended release tablet Take 600 mg by mouth as needed       Probiotic Product (PROBIOTIC DAILY PO) Take by mouth nightly      Ascorbic Acid (VITAMIN C) 250 MG tablet Take 250 mg by mouth daily      topiramate (TOPAMAX) 25 MG tablet Take 25 mg by mouth 2 times daily       Wheat Dextrin (BENEFIBER) POWD Take by mouth Daily 3 Tablespoons      folic acid (FOLVITE) 1 MG tablet Take 800 mcg by mouth daily       SUMAtriptan (IMITREX) 100 MG tablet Take 100 mg by mouth once as needed for Migraine      diazepam (VALIUM) 5 MG tablet Take 0.5 tablets by mouth nightly       montelukast (SINGULAIR) 10 MG tablet Take 1 tablet by mouth daily      amphetamine-dextroamphetamine (ADDERALL, 20MG,) 20 MG tablet Take 20 mg by mouth daily .  rosuvastatin (CRESTOR) 5 MG tablet Take 5 mg by mouth every other day       diphenhydrAMINE (BENADRYL) 25 MG tablet Take 25 mg by mouth nightly.  estradiol (ESTRACE VAGINAL) 0.1 MG/GM vaginal cream Place 2 g vaginally once a week.  cycloSPORINE (RESTASIS) 0.05 % ophthalmic emulsion Place 1 drop into both eyes 2 times daily.  amitriptyline (ELAVIL) 10 MG tablet Take 10 mg by mouth nightly.         baclofen (LIORESAL) 20 MG tablet Take 20 mg by mouth 2 times daily       Docusate Calcium (STOOL SOFTENER PO) Take 1 capsule by mouth 3 times daily.  vitamin D (ERGOCALCIFEROL) 90137 UNIT CAPS capsule Take 50,000 Units by mouth once a week       Calcium Citrate-Vitamin D 200-250 MG-UNIT TABS Take 1 tablet by mouth 2 times daily Gummies      therapeutic multivitamin-minerals (THERAGRAN-M) tablet Take 1 tablet by mouth 2 times daily Gummies      Ferrous Fumarate (IRON) 18 MG TBCR Take 2 tablets by mouth daily       Coenzyme Q-10 100 MG CAPS Take 1 capsule by mouth daily       Magnesium 250 MG TABS Take 500 mg by mouth every evening        No current facility-administered medications for this visit. PE -  Gen : /76 (Site: Left Upper Arm, Position: Sitting, Cuff Size: Medium Adult)   Pulse 82   Temp 97 °F (36.1 °C) (Temporal)   Ht 5' 4.75\" (1.645 m)   Wt 162 lb 3.2 oz (73.6 kg)   BMI 27.20 kg/m²    WN, WD, NAD  Lung: Nml resp effort  Psych: Normal mood   Full affect  Neuro:  Moves all ext well  ______________________      HPI & A/P -   Problem 1  - Hypothyroidism  HPI   - See above Background for description      Target TSH for her age and osteopenia is 4.5-5.0      06/28/21 TSH 3.010 on LT4 50 mcg daily M-F, none [place]      08/04/21 TSH 1.830 on LT4 50 mcg daily M-F, none [place]      Symptoms: + chronic constipation, no diarrhea, sensitive to heat, + palpitations episodes have increased in freq, duration and are now associated with non-specific symptoms;  no tremors,  no sweating,  + fatigue  Assessment  - Controlled  Plan   -   Decrease LT4 to 25 mcg, one tablet daily M-F and two tab daily Sa and Blanchard  Take by itself with water only first thing in the morning before taking anything else    Have repeat TSH performed in six weeks  Hold all B-vitamin containing supplements for one week before the test    Problem 2  - Osteopenia  HPI   - See above Background for description    12/14/18 DEXA  T-scores:  LFN -1.4,  RFN -1.7, L1-4 +0.1, L1 -0.5    10 yr Fraxx Risk 8.8% Major, 0.9% Hip    Has chose to take D3 50k weekly rather than 5k daily  Assessment  - Controlled  Plan   - Cont with Ca (1500 mg/day) and D3 50,000 IU weekly supplements    Follow up  Return to see me in 3 months    Van Leal MD  Endocrinology/Obesity  8/13/21

## 2021-08-13 NOTE — PATIENT INSTRUCTIONS
Decrease LT4 to 25 mcg, one tablet daily M-F and two tab daily Sa and Blanchard  Take by itself with water only first thing in the morning before taking anything else    Have repeat TSH performed in six weeks  Hold all B-vitamin containing supplements for one week before the test    Cont with Ca (1500 mg/day, as Calcium Citrate or Calcium Phosphate) and D3 (50,000 IU weekly) supplements    See me back in three months

## 2021-08-18 ENCOUNTER — TELEPHONE (OUTPATIENT)
Dept: CARDIOLOGY | Age: 61
End: 2021-08-18

## 2021-08-18 NOTE — TELEPHONE ENCOUNTER
Spoke with patient and confirmed nuclear stress test appointment on August 20, 2021 at 0900. Instructions for test and COVID-19 preprocedure checklist reviewed with patient.

## 2021-08-20 ENCOUNTER — HOSPITAL ENCOUNTER (OUTPATIENT)
Dept: CARDIOLOGY | Age: 61
Discharge: HOME | End: 2021-08-20
Payer: MEDICARE

## 2021-08-20 VITALS
BODY MASS INDEX: 26.99 KG/M2 | HEART RATE: 78 BPM | DIASTOLIC BLOOD PRESSURE: 80 MMHG | WEIGHT: 162 LBS | HEIGHT: 65 IN | SYSTOLIC BLOOD PRESSURE: 114 MMHG | RESPIRATION RATE: 16 BRPM

## 2021-08-20 LAB
LV EF: 60 %
LV EF: 69 %
LVEF MODALITY: NORMAL
LVEF MODALITY: NORMAL

## 2021-08-20 PROCEDURE — 3430000000 HC RX DIAGNOSTIC RADIOPHARMACEUTICAL: Performed by: INTERNAL MEDICINE

## 2021-08-20 PROCEDURE — 93017 CV STRESS TEST TRACING ONLY: CPT

## 2021-08-20 PROCEDURE — 78452 HT MUSCLE IMAGE SPECT MULT: CPT

## 2021-08-20 PROCEDURE — 93306 TTE W/DOPPLER COMPLETE: CPT

## 2021-08-20 PROCEDURE — 2580000003 HC RX 258: Performed by: INTERNAL MEDICINE

## 2021-08-20 PROCEDURE — A9500 TC99M SESTAMIBI: HCPCS | Performed by: INTERNAL MEDICINE

## 2021-08-20 RX ORDER — SODIUM CHLORIDE 0.9 % (FLUSH) 0.9 %
10 SYRINGE (ML) INJECTION PRN
Status: DISCONTINUED | OUTPATIENT
Start: 2021-08-20 | End: 2021-08-21 | Stop reason: HOSPADM

## 2021-08-20 RX ORDER — LANOLIN ALCOHOL/MO/W.PET/CERES
3 CREAM (GRAM) TOPICAL NIGHTLY PRN
COMMUNITY

## 2021-08-20 RX ORDER — CELECOXIB 200 MG/1
200 CAPSULE ORAL DAILY
COMMUNITY

## 2021-08-20 RX ORDER — ASPIRIN 81 MG/1
81 TABLET ORAL DAILY
COMMUNITY

## 2021-08-20 RX ADMIN — Medication 32.3 MILLICURIE: at 10:52

## 2021-08-20 RX ADMIN — Medication 10.5 MILLICURIE: at 09:07

## 2021-08-20 RX ADMIN — SODIUM CHLORIDE, PRESERVATIVE FREE 10 ML: 5 INJECTION INTRAVENOUS at 09:07

## 2021-08-20 RX ADMIN — SODIUM CHLORIDE, PRESERVATIVE FREE 10 ML: 5 INJECTION INTRAVENOUS at 10:52

## 2021-08-20 NOTE — PROCEDURES
[address] [facility] [address] [phone]                Exercise Stress Nuclear Gated SPECT Study    Name: [address] Account Number: [de-identified]    :  1960      Sex: female              Date of Study:  2021    Height: 5' 5\" (165.1 cm)  Weight: 162 lb (73.5 kg)     Ordering Provider: Mckenzie Durán MD          PCP: Eulogio Hughes MD      Cardiologist: none                        Interpreting Physician: Angele Aschoff, DO  _________________________________________________________________________________    Indication:   Detecting the presence and location of coronary artery disease    Clinical History:   Patient has no known history of coronary artery disease. Resting ECG:    Normal sinus rhythm, Nonspecific ST-T wave changes and Occasional PVCs    Exercise: The patient exercised using a Zbigniew protocol, completing 8:30 minutes and reaching an estimated work load of 41.1 metabolic equivalents (METS). Resting HR was 78. Peak exercise heart rate was 152 ( 96% of maximum predicted heart rate for age). Baseline /80. Peak exercise /72. The blood pressure response to exercise was normal      Exercise was terminated due to heart rate attained and dyspnea. The patient experienced no chest pain with exercise. Exercise ECG:   The patient demonstrated frequent PVC's' during exercise. With exercise, there were no ST segment changes of significance at the heart rate achieved. Carmona treadmill score was 8.5 implying low risk. IMAGING: Myocardial perfusion imaging was performed at rest 30-35 minutes following the intravenous injection of 10.5 mCi of (Tc-Sestamibi) followed by 10 ml of Normal Saline. At peak exercise, the patient was injected intravenously with 32.3 mCi of (Tc-Sestamibi) followed by 10 ml of Normal Saline.   Gated post-stress tomographic imaging was performed 20-25 minutes after stress. FINDINGS: The overall quality of the study was fair. Left ventricular cavity size was noted to be normal.    Rotational analog analysis demonstrated soft tissue breast attenuation. The gated SPECT stress imaging in the short, vertical long, and horizontal long axis demonstrated normal homogeneous tracer distribution throughout the myocardium. The resting images are normal.     Gated SPECT left ventricular ejection fraction was calculated to be 69%, with normal myocardial thickening and wall motion. Impression:    1. Exercise EKG was negative. 2. The patient experienced no chest pain with exercise. 3. The myocardial perfusion imaging was normal with attenuation artifact. 4. Overall left ventricular systolic function was normal without regional wall motion abnormalities. 5. Carmona treadmill score was 8.5 implying low risk. 6. Exercise capacity was average. 7. Low risk general exercise treadmill test.    Thank you for sending your patient to this [employer].      Electronically signed by Rimma Ramirez DO on 8/20/21 at 12:21 PM EDT

## 2021-10-11 ENCOUNTER — HOSPITAL ENCOUNTER (OUTPATIENT)
Age: 61
Discharge: HOME | End: 2021-10-11
Payer: MEDICARE

## 2021-10-11 DIAGNOSIS — K91.2 MALNUTRITION FOLLOWING GASTROINTESTINAL SURGERY: ICD-10-CM

## 2021-10-11 DIAGNOSIS — E06.3 HYPOTHYROIDISM DUE TO HASHIMOTO'S THYROIDITIS: ICD-10-CM

## 2021-10-11 DIAGNOSIS — E03.8 HYPOTHYROIDISM DUE TO HASHIMOTO'S THYROIDITIS: ICD-10-CM

## 2021-10-11 LAB
ALBUMIN SERPL-MCNC: 4.2 G/DL (ref 3.5–5.2)
ALP BLD-CCNC: 111 U/L (ref 35–104)
ALT SERPL-CCNC: 16 U/L (ref 0–32)
ANION GAP SERPL CALCULATED.3IONS-SCNC: 9 MMOL/L (ref 7–16)
AST SERPL-CCNC: 19 U/L (ref 0–31)
BILIRUB SERPL-MCNC: 0.5 MG/DL (ref 0–1.2)
BUN BLDV-MCNC: 14 MG/DL (ref 6–23)
CALCIUM SERPL-MCNC: 9.2 MG/DL (ref 8.6–10.2)
CHLORIDE BLD-SCNC: 105 MMOL/L (ref 98–107)
CHOLESTEROL, TOTAL: 189 MG/DL (ref 0–199)
CO2: 26 MMOL/L (ref 22–29)
CREAT SERPL-MCNC: 0.7 MG/DL (ref 0.5–1)
FERRITIN: 47 NG/ML
FOLATE: >20 NG/ML (ref 4.8–24.2)
GFR AFRICAN AMERICAN: >60
GFR NON-AFRICAN AMERICAN: >60 ML/MIN/1.73
GLUCOSE BLD-MCNC: 93 MG/DL (ref 74–99)
HCT VFR BLD CALC: 40.2 % (ref 34–48)
HEMOGLOBIN: 13.3 G/DL (ref 11.5–15.5)
MCH RBC QN AUTO: 31.7 PG (ref 26–35)
MCHC RBC AUTO-ENTMCNC: 33.1 % (ref 32–34.5)
MCV RBC AUTO: 95.7 FL (ref 80–99.9)
PDW BLD-RTO: 12.7 FL (ref 11.5–15)
PLATELET # BLD: 245 E9/L (ref 130–450)
PMV BLD AUTO: 9.8 FL (ref 7–12)
POTASSIUM SERPL-SCNC: 3.8 MMOL/L (ref 3.5–5)
PREALBUMIN: 21 MG/DL (ref 20–40)
RBC # BLD: 4.2 E12/L (ref 3.5–5.5)
SODIUM BLD-SCNC: 140 MMOL/L (ref 132–146)
TOTAL PROTEIN: 6.6 G/DL (ref 6.4–8.3)
TRIGL SERPL-MCNC: 52 MG/DL (ref 0–149)
TSH SERPL DL<=0.05 MIU/L-ACNC: 2.2 UIU/ML (ref 0.27–4.2)
VITAMIN B-12: 569 PG/ML (ref 211–946)
WBC # BLD: 4.4 E9/L (ref 4.5–11.5)

## 2021-10-11 PROCEDURE — 82465 ASSAY BLD/SERUM CHOLESTEROL: CPT

## 2021-10-11 PROCEDURE — 84134 ASSAY OF PREALBUMIN: CPT

## 2021-10-11 PROCEDURE — 36415 COLL VENOUS BLD VENIPUNCTURE: CPT

## 2021-10-11 PROCEDURE — 82525 ASSAY OF COPPER: CPT

## 2021-10-11 PROCEDURE — 84255 ASSAY OF SELENIUM: CPT

## 2021-10-11 PROCEDURE — 82607 VITAMIN B-12: CPT

## 2021-10-11 PROCEDURE — 80053 COMPREHEN METABOLIC PANEL: CPT

## 2021-10-11 PROCEDURE — 82746 ASSAY OF FOLIC ACID SERUM: CPT

## 2021-10-11 PROCEDURE — 84630 ASSAY OF ZINC: CPT

## 2021-10-11 PROCEDURE — 84478 ASSAY OF TRIGLYCERIDES: CPT

## 2021-10-11 PROCEDURE — 85027 COMPLETE CBC AUTOMATED: CPT

## 2021-10-11 PROCEDURE — 82728 ASSAY OF FERRITIN: CPT

## 2021-10-11 PROCEDURE — 84443 ASSAY THYROID STIM HORMONE: CPT

## 2021-10-11 PROCEDURE — 84425 ASSAY OF VITAMIN B-1: CPT

## 2021-10-14 LAB
COPPER: 127.8 UG/DL (ref 80–155)
SELENIUM: 116.6 UG/L (ref 23–190)
ZINC: 82 UG/DL (ref 60–120)

## 2021-10-16 LAB — VITAMIN B1 WHOLE BLOOD: 147 NMOL/L (ref 70–180)

## 2021-10-24 NOTE — RESULT ENCOUNTER NOTE
Let pt know that her TSH is in the normal range. However, it is lower than I prefer. I'll discuss with her at the next appt about possibly changing the LT4 dose.

## 2021-11-18 ENCOUNTER — OFFICE VISIT (OUTPATIENT)
Dept: BARIATRICS/WEIGHT MGMT | Age: 61
End: 2021-11-18
Payer: MEDICARE

## 2021-11-18 VITALS
SYSTOLIC BLOOD PRESSURE: 120 MMHG | TEMPERATURE: 97.2 F | HEART RATE: 90 BPM | HEIGHT: 65 IN | DIASTOLIC BLOOD PRESSURE: 61 MMHG | BODY MASS INDEX: 27.46 KG/M2 | WEIGHT: 164.8 LBS

## 2021-11-18 VITALS
RESPIRATION RATE: 20 BRPM | HEIGHT: 65 IN | SYSTOLIC BLOOD PRESSURE: 137 MMHG | BODY MASS INDEX: 27.16 KG/M2 | WEIGHT: 163 LBS | TEMPERATURE: 97 F | DIASTOLIC BLOOD PRESSURE: 82 MMHG | HEART RATE: 85 BPM

## 2021-11-18 DIAGNOSIS — K91.2 MALNUTRITION FOLLOWING GASTROINTESTINAL SURGERY: Primary | ICD-10-CM

## 2021-11-18 DIAGNOSIS — E03.8 HYPOTHYROIDISM DUE TO HASHIMOTO'S THYROIDITIS: Primary | ICD-10-CM

## 2021-11-18 DIAGNOSIS — E06.3 HYPOTHYROIDISM DUE TO HASHIMOTO'S THYROIDITIS: Primary | ICD-10-CM

## 2021-11-18 PROCEDURE — 99211 OFF/OP EST MAY X REQ PHY/QHP: CPT

## 2021-11-18 PROCEDURE — 99214 OFFICE O/P EST MOD 30 MIN: CPT | Performed by: INTERNAL MEDICINE

## 2021-11-18 PROCEDURE — 99214 OFFICE O/P EST MOD 30 MIN: CPT | Performed by: SURGERY

## 2021-11-18 RX ORDER — LEVOTHYROXINE SODIUM 25 UG/1
TABLET ORAL
Qty: 103 TABLET | Refills: 3
Start: 2021-11-18 | End: 2022-09-08 | Stop reason: SDUPTHER

## 2021-11-18 NOTE — PROGRESS NOTES
CC -   Hypothyroidism, post- RYGB    Background -   Last visit:   08/13/21  First visit:   11/04/20    · Hypothyroidism  Diagnosed 2010  Due to Hashimoto's   Prefers Levoxyl b/c of belief that it is better absorbed in pts with RYGB (told this by her PCP)  No arrhythmias  + Osteopenia  Has been on Fosamax for three years in the past  Takes her LT4 in the evening    9/21/20 TSH 3.060 on LT4 50mcg daily, increased to 75 mcg daily by PCP  11/04/20                                                    dcrs LT4 to 50 mcg daily M-Sa, none Blanchard  12/29/20 TSH 2.100 on LT4 50 mcg daily M-Sa, none Blanchard, on 5/21/21 decr to one tab M-F, no Sa or Blanchard  06/28/21 TSH 3.010 on LT4 50 mcg daily M-F, none Sa or Blanchard  08/04/21 TSH 1.830 on LT4 50 mcg daily M-F, none Sa or Blanchard, Decr 25 mcg, one tablet daily M-F and two tab daily Sa and Blanchard  10/11/21 TSH 2.200 on LT4 25 mcg, one tablet daily M-F and two tab daily Sa and Blanchard  ______________________    [facility] -  Past Medical History:   Diagnosis Date    Acid reflux     Arthritis     Asthma     Clotting disorder ([facility] [address] 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, NEAL-1 genotype Heterozygote 5G/4G    CRPS (complex regional pain syndrome type I)     Gastric bypass status for obesity     Heart palpitations     History of constipation     History of phlebitis     Hyperlipidemia     Hypothyroidism     Lower leg DVT (deep venous thrombosis) (HCC)     Migraines     Varicose veins      Current Outpatient Medications   Medication Sig Dispense Refill    Loratadine (CLARITIN PO) Take by mouth as needed      celecoxib (CELEBREX) 200 MG capsule Take 200 mg by mouth daily      melatonin 3 MG TABS tablet Take 3 mg by mouth nightly as needed      aspirin 81 MG EC tablet Take 81 mg by mouth daily      LEVOXYL 25 MCG tablet one tablet daily M-F and two tab daily Sa and Blanchard 116 tablet 3    Zinc Gluconate 15 MG TABS Take one tablet daily (Patient taking differently: 30 mg Take one tablet daily) 90 tablet 3    fexofenadine-pseudoephedrine (ALLEGRA-D ALLERGY & CONGESTION) 180-240 MG per extended release tablet Take 1 tablet by mouth daily (Patient not taking: Reported on 8/20/2021)      azelastine (ASTELIN) 0.1 % nasal spray 2 times daily       metroNIDAZOLE (METROGEL) 1 % gel       ondansetron (ZOFRAN) 4 MG tablet TAKE ONE TABLET BY MOUTH EVERY 6 TO 8 HOURS AS NEEDED FOR NAUSEA.  tretinoin microspheres (RETIN-A MICRO) 0.04 % gel       zolpidem (AMBIEN) 5 MG tablet Take 5 mg by mouth nightly.   0    clindamycin-benzoyl peroxide (BENZACLIN) 1-5 % gel       DULoxetine (CYMBALTA) 60 MG extended release capsule Take 60 mg by mouth daily  2    promethazine (PHENERGAN) 25 MG tablet as needed  1    albuterol sulfate  (90 Base) MCG/ACT inhaler Inhale 2 puffs into the lungs every 6 hours as needed for Wheezing      diclofenac sodium 1 % GEL Apply 2 g topically 2 times daily      ipratropium (ATROVENT) 0.03 % nasal spray 2 sprays by Nasal route every 12 hours (Patient not taking: Reported on 8/20/2021)      famotidine (PEPCID) 20 MG tablet Take 20 mg by mouth once      loteprednol (ALREX) 0.2 % SUSP 1 drop 4 times daily      bisacodyl (DULCOLAX) 5 MG EC tablet Take 5 mg by mouth daily as needed for Constipation      guaiFENesin (MUCINEX) 600 MG extended release tablet Take 600 mg by mouth as needed       Probiotic Product (PROBIOTIC DAILY PO) Take by mouth nightly      Ascorbic Acid (VITAMIN C) 250 MG tablet Take 500 mg by mouth daily       topiramate (TOPAMAX) 25 MG tablet Take 25 mg by mouth nightly       Wheat Dextrin (BENEFIBER) POWD Take by mouth Daily 1 Tablespoons      folic acid (FOLVITE) 1 MG tablet Take 800 mcg by mouth daily  (Patient not taking: Reported on 8/20/2021)      SUMAtriptan (IMITREX) 100 MG tablet Take 100 mg by mouth once as needed for Migraine      diazepam (VALIUM) 5 MG tablet Take 0.5 tablets by mouth nightly       montelukast (SINGULAIR) 10 MG tablet Take 1 tablet by mouth daily      amphetamine-dextroamphetamine (ADDERALL, 20MG,) 20 MG tablet Take 20 mg by mouth daily .  rosuvastatin (CRESTOR) 5 MG tablet Take 5 mg by mouth every other day       diphenhydrAMINE (BENADRYL) 25 MG tablet Take 25 mg by mouth nightly.  estradiol (ESTRACE VAGINAL) 0.1 MG/GM vaginal cream Place 2 g vaginally once a week.  cycloSPORINE (RESTASIS) 0.05 % ophthalmic emulsion Place 1 drop into both eyes 2 times daily.  amitriptyline (ELAVIL) 10 MG tablet Take 10 mg by mouth nightly.  baclofen (LIORESAL) 20 MG tablet Take 20 mg by mouth 2 times daily       Docusate Calcium (STOOL SOFTENER PO) Take 1 capsule by mouth daily       vitamin D (ERGOCALCIFEROL) 84182 UNIT CAPS capsule Take 50,000 Units by mouth once a week       Calcium Citrate-Vitamin D 200-250 MG-UNIT TABS Take 1 tablet by mouth 2 times daily Gummies      therapeutic multivitamin-minerals (THERAGRAN-M) tablet Take 1 tablet by mouth 2 times daily Gummies      Ferrous Fumarate (IRON) 18 MG TBCR Take 2 tablets by mouth daily       Coenzyme Q-10 100 MG CAPS Take 1 capsule by mouth every other day       Magnesium 250 MG TABS Take 500 mg by mouth every evening        No current facility-administered medications for this visit. PE -  Gen : /61 (Site: Left Upper Arm, Position: Sitting, Cuff Size: Large Adult)   Pulse 90   Temp 97.2 °F (36.2 °C) (Temporal)   Ht 5' 4.75\" (1.645 m)   Wt 164 lb 12.8 oz (74.8 kg)   BMI 27.64 kg/m²    WN, WD, NAD  Lung: Nml resp effort  Psych: Normal mood   Full affect  Neuro:  Moves all ext well  ______________________      HPI & A/P -   Problem 1  - Hypothyroidism  HPI   - See above Background for description      Target TSH for her age and osteopenia is 4.5-5.0      06/28/21 TSH 3.010 on LT4 50 mcg daily M-F, none Blanchard      08/04/21 TSH 1.830 on LT4 50 mcg daily M-F, none Blanchard, Decr 25 mcg, one tablet daily M-F and two tab daily Sa and Blanchard      10/11/21

## 2021-11-18 NOTE — PROGRESS NOTES
Patient is 24 yr LRYGB. Patient stated that she is having a lot of heart burn recently. Water intake is around 40 oz daily. Protein through shakes and foods. Vitamin intake is good. Bowel movements are good.

## 2021-11-18 NOTE — PATIENT INSTRUCTIONS
Decrease LT4 to 25 mcg, one tablet daily M-Sa and two tab Blanchard  Take by itself with water only first thing in the morning before taking anything else    Have repeat TSH performed in six weeks  Hold all B-vitamin containing supplements for one week before the test    Cont with Ca (1500 mg/day) and D3 50,000 IU weekly supplements    Return to see me in 3 months

## 2021-11-18 NOTE — PROGRESS NOTES
Laura from Lab called with critical result of ANC of 0.0 at 0406. Critical lab result read back to Laura.   This critical lab result is within parameters established by [facility] oncology policy.     Taking? Authorizing Provider   LEVOXYL 25 MCG tablet one tablet daily M-Sa and two tab Blanchard Yes Elma Bhardwaj MD   Loratadine (CLARITIN PO) Take by mouth as needed Yes Historical Provider, MD   celecoxib (CELEBREX) 200 MG capsule Take 200 mg by mouth daily Yes Historical Provider, MD   melatonin 3 MG TABS tablet Take 3 mg by mouth nightly as needed Yes Historical Provider, MD   aspirin 81 MG EC tablet Take 81 mg by mouth daily Yes Historical Provider, MD   Zinc Gluconate 15 MG TABS Take one tablet daily  Patient taking differently: 30 mg Take one tablet daily Yes Elma Bhardwaj MD   azelastine (ASTELIN) 0.1 % nasal spray 2 times daily  Yes Historical Provider, MD   metroNIDAZOLE (METROGEL) 1 % gel  Yes Historical Provider, MD   ondansetron (ZOFRAN) 4 MG tablet TAKE ONE TABLET BY MOUTH EVERY 6 TO 8 HOURS AS NEEDED FOR NAUSEA. Yes Historical Provider, MD   tretinoin microspheres (RETIN-A MICRO) 0.04 % gel  Yes Historical Provider, MD   zolpidem (AMBIEN) 5 MG tablet Take 5 mg by mouth nightly.  Yes Historical Provider, MD   clindamycin-benzoyl peroxide (BENZACLIN) 1-5 % gel  Yes Historical Provider, MD   DULoxetine (CYMBALTA) 60 MG extended release capsule Take 60 mg by mouth daily Yes Historical Provider, MD   promethazine (PHENERGAN) 25 MG tablet as needed Yes Historical Provider, MD   albuterol sulfate  (90 Base) MCG/ACT inhaler Inhale 2 puffs into the lungs every 6 hours as needed for Wheezing Yes Historical Provider, MD   diclofenac sodium 1 % GEL Apply 2 g topically 2 times daily Yes Historical Provider, MD   ipratropium (ATROVENT) 0.03 % nasal spray 2 sprays by Nasal route every 12 hours  Yes Historical Provider, MD   famotidine (PEPCID) 20 MG tablet Take 20 mg by mouth once Yes Historical Provider, MD   loteprednol (ALREX) 0.2 % SUSP 1 drop 4 times daily Yes Historical Provider, MD   bisacodyl (DULCOLAX) 5 MG EC tablet Take 5 mg by mouth daily as needed for Constipation Yes Historical Provider, MD tablet Take 1 tablet by mouth 2 times daily Gummies Yes Historical Provider, MD   Ferrous Fumarate (IRON) 18 MG TBCR Take 2 tablets by mouth daily  Yes Historical Provider, MD   Coenzyme Q-10 100 MG CAPS Take 1 capsule by mouth every other day  Yes Historical Provider, MD   Magnesium 250 MG TABS Take 500 mg by mouth every evening  Yes Historical Provider, MD     Allergies: Dust mite extract, Other, Doxycycline, Ketorolac tromethamine, Lyrica [pregabalin], Morphine, Pcn [penicillins], and Fentanyl   Social History:   TOBACCO:   reports that she has never smoked. She has never used smokeless tobacco.  All smokers should join the free smoking cessation program and stop smoking before having surgery. ETOH:    reports current alcohol use. Family History   Problem Relation Age of Onset    Rheumatologic Disease Mother         Connective tissue disease    Heart Disease Mother     Other Mother 79        Pacemaker, CMP, Pulmonary HTN     Review of Systems:  Psychiatric:  depression and anxiety  Respiratory: negative  Cardiovascular: negative  Gastrointestinal: negative  Musculoskeletal:negative  All others reviewed, negative    Physical Exam:   VITALS: Blood pressure 137/82, pulse 85, temperature 97 °F (36.1 °C), temperature source Temporal, resp. rate 20, height 5' 5\" (1.651 m), weight 163 lb (73.9 kg). General appearance: alert, appears stated age and cooperative, does ambulate easily  Head: Normocephalic, without obvious abnormality, atraumatic  Eyes: PERRL  Ears/mouth/throat:  Ears clear, mouth normal, throat no redness  Neck: no adenopathy, no JVD, supple, symmetrical, trachea midline and thyroid not enlarged  Lungs: clear to auscultation bilaterally  Heart: regular rate and rhythm  Abdomen: soft, non-tender; bowel sounds normal; no masses,  no organomegaly  Extremities: extremities normal, atraumatic, no cyanosis or edema  Skin: no open wounds    Assessment:   Post Edwin-en- Y Gastric Bypass.   She does not complain of GERD but uses Pepcid every other day for ulcer pains which are worse now, under lots of stress. Cholesterol 189, triglycerides 52. Some constipation. Plan:  Nexium daily for ulcer pains. She will need an EGD if the epigastric pain continues, she wants to wait. Follow up in 12 months.       Physician Signature: Electronically signed by Dr. Dmitry Borden MD

## 2022-01-18 ENCOUNTER — OFFICE VISIT (OUTPATIENT)
Dept: PODIATRY | Age: 62
End: 2022-01-18
Payer: MEDICARE

## 2022-01-18 VITALS
WEIGHT: 158 LBS | DIASTOLIC BLOOD PRESSURE: 82 MMHG | BODY MASS INDEX: 26.29 KG/M2 | TEMPERATURE: 97.7 F | SYSTOLIC BLOOD PRESSURE: 128 MMHG

## 2022-01-18 DIAGNOSIS — M25.552 CHRONIC LEFT HIP PAIN: ICD-10-CM

## 2022-01-18 DIAGNOSIS — M20.42 HAMMER TOES OF BOTH FEET: Primary | ICD-10-CM

## 2022-01-18 DIAGNOSIS — M20.41 HAMMER TOES OF BOTH FEET: Primary | ICD-10-CM

## 2022-01-18 DIAGNOSIS — M79.675 PAIN IN LEFT TOE(S): ICD-10-CM

## 2022-01-18 DIAGNOSIS — G89.29 CHRONIC LEFT HIP PAIN: ICD-10-CM

## 2022-01-18 PROCEDURE — 99203 OFFICE O/P NEW LOW 30 MIN: CPT | Performed by: PODIATRIST

## 2022-01-18 NOTE — PATIENT INSTRUCTIONS
Surgery will be scheduled for 3/4/22 at [facility] in [address] [place] [address] [place]  They will contact you to give you all your preop instructions  Do not eat or drink anything after midnight on 3/3/2022 nothing morning of surgery  Call Mony Massey if any issues [phone]  Someone has to drive you to and from the surgery  Avoid anti -inflammatories 7 days prior to surgery  Call Dr. Chaitanya Andrea and set up a preop clearance history and physical appointment anytime after 2/5/2022 for it to be valid has to be within 30 days of sx  No special paperwork needs filled out he needs to see you do a complete history and physical and will need office note clearing you for sx and ekg and labs   Need it faxed to [phone]

## 2022-01-18 NOTE — PROGRESS NOTES
1 [facility] PODIATRY  [address] [place], [place] [address] E Naomie Timothy  Dept: [phone]  Dept Fax: [phone]  Loc: [phone]    NEW PATIENT PROGRESS NOTE  Date of patient's visit: 1/18/2022  Patient's Name:  Katina Johnston YOB: 1960            Patient Care Team:  Lester Snellen., MD as PCP - General (Family Medicine)        Chief Complaint   Patient presents with   Myranda Blankenship Doctor     was seeing another foot dr. for this had two injections 3-4 mos ago and had xrays     Hammer Toe     second toe left foot and arthritis left foot        HPI  HPI:   Katina Johnston is a 57 y.o. female who presents to the office today complaining of hammertoes left foot. This new patient is seen today regarding hammertoes on her left foot second and fourth digit are progressively gotten worse painful at this time the patient is here to discuss treatment options. .       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  Authorizing Provider   LEVOXYL 25 MCG tablet one tablet daily M-Sa and two tab [facility] 11/18/21  Yes Destiny Vila MD   esomeprazole ([address]) 20 MG delayed release capsule Take 1 capsule by mouth daily 11/18/21  Yes Jose Navarro MD   Loratadine (CLARITIN PO) Take by mouth as needed   Yes Historical Provider, MD   celecoxib (CELEBREX) 200 MG capsule Take 200 mg by mouth daily   Yes Historical Provider, MD   melatonin 3 MG TABS tablet Take 3 mg by mouth nightly as needed   Yes Historical Provider, MD   aspirin 81 MG EC tablet Take 81 mg by mouth daily   Yes Historical Provider, MD   Zinc Gluconate 15 MG TABS Take one tablet daily  Patient taking differently: 30 mg Take one tablet daily 11/4/20  Yes Destiny Vila MD   azelastine (ASTELIN) 0.1 % nasal spray 2 times daily  7/21/20  Yes Historical Provider, MD   metroNIDAZOLE (METROGEL) 1 % gel  8/12/20  Yes Historical Provider, MD   tretinoin microspheres (RETIN-A MICRO) 0.04 % gel  9/25/20  Yes Historical Provider, MD   zolpidem (AMBIEN) 5 MG tablet Take 5 mg by mouth nightly.  10/11/19  Yes Historical Provider, MD   clindamycin-benzoyl peroxide (BENZACLIN) 1-5 % gel  1/9/19  Yes Historical Provider, MD   DULoxetine (CYMBALTA) 60 MG extended release capsule Take 60 mg by mouth daily 1/3/19  Yes Historical Provider, MD   promethazine (PHENERGAN) 25 MG tablet as needed 12/21/18  Yes Historical Provider, MD   albuterol sulfate  (90 Base) MCG/ACT inhaler Inhale 2 puffs into the lungs every 6 hours as needed for Wheezing   Yes Historical Provider, MD   diclofenac sodium 1 % GEL Apply 2 g topically 2 times daily   Yes Historical Provider, MD   loteprednol (ALREX) 0.2 % SUSP 1 drop 4 times daily   Yes Historical Provider, MD   bisacodyl (DULCOLAX) 5 MG EC tablet Take 5 mg by mouth daily as needed for Constipation   Yes Historical Provider, MD   guaiFENesin (MUCINEX) 600 MG extended release tablet Take 600 mg by mouth as needed    Yes Historical Provider, MD   Probiotic Product (PROBIOTIC DAILY PO) Take by mouth nightly   Yes Historical Provider, MD   Ascorbic Acid (VITAMIN C) 250 MG tablet Take 500 mg by mouth daily    Yes Historical Provider, MD   topiramate (TOPAMAX) 25 MG tablet Take 25 mg by mouth nightly    Yes Historical Provider, MD   Wheat Dextrin (Viona Gaona) POWD Take by mouth Daily 1 Tablespoons   Yes Historical Provider, MD   SUMAtriptan (IMITREX) 100 MG tablet Take 100 mg by mouth once as needed for Migraine   Yes Historical Provider, MD   diazepam (VALIUM) 5 MG tablet Take 0.5 tablets by mouth nightly  3/19/17  Yes Historical Provider, MD   montelukast (SINGULAIR) 10 MG tablet Take 1 tablet by mouth daily 3/14/17  Yes Historical Provider, MD   amphetamine-dextroamphetamine (ADDERALL, 20MG,) 20 MG tablet Take 20 mg by mouth daily . Yes Historical Provider, MD   rosuvastatin (CRESTOR) 5 MG tablet Take 5 mg by mouth every other day    Yes Historical Provider, MD   diphenhydrAMINE (BENADRYL) 25 MG tablet Take 25 mg by mouth nightly. Yes Historical Provider, MD   estradiol (ESTRACE VAGINAL) 0.1 MG/GM vaginal cream Place 2 g vaginally once a week. Yes Historical Provider, MD   cycloSPORINE (RESTASIS) 0.05 % ophthalmic emulsion Place 1 drop into both eyes 2 times daily. Yes Historical Provider, MD   amitriptyline (ELAVIL) 10 MG tablet Take 10 mg by mouth nightly.      Yes Historical Provider, MD   baclofen (LIORESAL) 20 MG tablet Take 20 mg by mouth 2 times daily    Yes Historical Provider, MD   Docusate Calcium (STOOL SOFTENER PO) Take 1 capsule by mouth daily    Yes Historical Provider, MD   vitamin D (ERGOCALCIFEROL) 88204 UNIT CAPS capsule Take 50,000 Units by mouth once a week    Yes Historical Provider, MD   Calcium Citrate-Vitamin D 200-250 MG-UNIT TABS Take 1 tablet by mouth 2 times daily Gummies   Yes Historical Provider, MD   therapeutic multivitamin-minerals (THERAGRAN-M) tablet Take 1 tablet by mouth 2 times daily Gummies   Yes Historical Provider, MD   Ferrous Fumarate (IRON) 18 MG TBCR Take 2 tablets by mouth daily Yes Historical Provider, MD   Coenzyme Q-10 100 MG CAPS Take 1 capsule by mouth every other day    Yes Historical Provider, MD   Magnesium 250 MG TABS Take 500 mg by mouth every evening    Yes Historical Provider, MD   ondansetron (ZOFRAN) 4 MG tablet TAKE ONE TABLET BY MOUTH EVERY 6 TO 8 HOURS AS NEEDED FOR NAUSEA. Patient not taking: Reported on 1/18/2022 7/17/20   Historical Provider, MD       Past Surgical History:   Procedure Laterality Date    ABDOMEN SURGERY      COLONOSCOPY  09/27/2017    ENDOSCOPY, COLON, DIAGNOSTIC      HERNIA REPAIR  07/02/2004    Dr. Jess Garcia - Repair of Incisional Hernia with Composix Dual Mesh   [address], 7/12/16    Dorsal column stimulator [facility], Battery change stimulator 7/12/2016    [place] EGD TRANSORAL BIOPSY SINGLE/MULTIPLE N/A 9/24/2018    EGD ESOPHAGOGASTRODUODENOSCOPY performed by Brandy Licea MD at [address] [place] [address] LAP, REPAIR PARAESOPHAGEAL HERNIA, INCL FUNDOPLASTY W/O MESH N/A 11/19/2018    ROBOTIC XI ASSISTED LAPAROSCOPIC HIATAL HERNIA REPAIR WITH MESH performed by Brandy Licea MD at [address] [place] [address]  09/30/2003    Dr. Stone Edwards  06/13/2003    Dr. Edgardo Marley [facility] - Findings:  Moderate gastritis with linear erosions, Small Hiatal Hernia with columnar epithelium extending up into the distal esophagus       Family History   Problem Relation Age of Onset    Rheumatologic Disease Mother         Connective tissue disease    Heart Disease Mother     Other Mother 79        Pacemaker, CMP, Pulmonary HTN       Social History     Tobacco Use    Smoking status: Never Smoker    Smokeless tobacco: Never Used   Substance Use Topics    Alcohol use: Yes     Comment: socially       Review of Systems    Review of Systems:   History obtained from chart review and the patient  General ROS: negative for - chills, fatigue, fever, night sweats or weight gain  Constitutional: Negative for chills, diaphoresis, fatigue, fever and unexpected weight change. Musculoskeletal: Positive for . Neurological ROS: negative for - behavioral changes, confusion, headaches or seizures. Negative for weakness and numbness. Dermatological ROS: negative for - mole changes, rash  Cardiovascular: Negative for leg swelling. Gastrointestinal: Negative for constipation, diarrhea, nausea and vomiting. Lower Extremity Physical Examination:   Vitals:   Vitals:    01/18/22 0729   BP: 128/82   Temp: 97.7 °F (36.5 °C)        Foot Exam    General  General Appearance: appears stated age and healthy   Orientation: alert and oriented to person, place, and time       Left Foot/Ankle      Inspection and Palpation  Tenderness: bony tenderness   Hammertoes: second toe and fourth toe  Claw toes: second toe and fourth toe  Hallux valgus: no  Skin Exam: skin intact; Neurovascular  Dorsalis pedis: 3+  Posterior tibial: 3+  Saphenous nerve sensation: normal  Tibial nerve sensation: normal  Superficial peroneal nerve sensation: normal  Deep peroneal nerve sensation: normal  Sural nerve sensation: normal  Achilles reflex: 2+  Babinski reflex: 2+    Muscle Strength  Ankle dorsiflexion: 5  Ankle plantar flexion: 5  Ankle inversion: 5  Ankle eversion: 5  Great toe extension: 5  Great toe flexion: 5          Left Ankle Exam     Muscle Strength   The patient has normal left ankle strength. Dorsiflexion:  5/5   Plantar flexion:  5/5             General: AAO x 3 in NAD. Dermatologic Exam:  Skin lesion/ulceration Absent . Skin No rashes or nodules noted. .   Musculoskeletal:   There is a contracted deviated fourth digit left foot.   There is a contracted hammertoe second digit left foot  1st MPJ ROM within normal limits, Bilateral.    Ankle ROM within normal limits,Bilateral.   HEEL PAIN      TARSAL TUNNEL PAIN      Vascular: 2/4 DP and PT left    Radiographs:  3 views 3 views of the left foot show a contracted hammertoe fourth digit left foot hammertoe second digit left foot. Foot/ankle:     Asessment: Patient is a 57 y.o. female with:    Diagnosis Orders   1. Hammer toes of both feet  XR FOOT LEFT (MIN 3 VIEWS)   2. Chronic left hip pain  Maryam 1, Irlanda Santos, DO, [address] [facility], [address] [place] [address]   3. Pain in left toe(s)         Plan: Patient examined and evaluated. Current condition and treatment options discussed in detail. Discussed conservative and surgical options with the patient. Treatment options today consisted of verbal and written instructions given to patient. Contact office with any questions/problems/concerns. RTC in 3week(s). Today we did discuss conservative versus surgical I did recommend a power step insert or wide new balance tennis shoes. But patient would like to discuss surgery this would be an arthrodesis second and fourth with tenotomy capsulotomy second digit left foot. I had a long discussion today with the patient about the likely diagnosis and its natural history, physical exam and imaging findings, as well as treatment options. We discussed both surgical and nonsurgical treatments, including risks and benefits. From a nonoperative standpoint, we discussed rest/activity modification, NSAIDs/Acetaminophen/topical anesthetics, orthotics, bracing/immobilization, and physical therapy. After conservative treatment has failed patient agrees and would like to have surgery today, we reviewed all aspects of the surgery including the risks the complications and the postop course there are no guarantees,   Patient agrees with surgery   we reviewed all risk and benefits all complications including anesthesia infection further surgery over under correction patient agrees to proceed with surgery.   General the terms and procedures of the treatment were undertaken there was alternative procedures and methods discussed with the patient the patient declined these and opted for surgery. All the risks with the procedure were reviewed.   Patient will have surgery at [facility] in the future       1/18/2022    Electronically signed by Mathew Jones DPM on 1/18/2022 at 9:18 AM  1/18/2022

## 2022-01-28 ENCOUNTER — TELEPHONE (OUTPATIENT)
Dept: PODIATRY | Age: 62
End: 2022-01-28

## 2022-01-28 NOTE — TELEPHONE ENCOUNTER
Bruce gurrola ref# [id number]    Outpatient foot Sx Procedure code 64250    DX code : ozzie    Is covered @ 80% after deductible.     Deductible: $ 0    Met Year: 0    Pre-cert Needed: no    Date of procedure : 3/4/22

## 2022-02-15 ENCOUNTER — TELEPHONE (OUTPATIENT)
Dept: BARIATRICS/WEIGHT MGMT | Age: 62
End: 2022-02-15

## 2022-02-22 ENCOUNTER — PREP FOR PROCEDURE (OUTPATIENT)
Dept: PODIATRY | Age: 62
End: 2022-02-22

## 2022-02-22 RX ORDER — SODIUM CHLORIDE 0.9 % (FLUSH) 0.9 %
5-40 SYRINGE (ML) INJECTION PRN
Status: CANCELLED | OUTPATIENT
Start: 2022-02-22

## 2022-02-22 RX ORDER — SODIUM CHLORIDE 0.9 % (FLUSH) 0.9 %
5-40 SYRINGE (ML) INJECTION EVERY 12 HOURS SCHEDULED
Status: CANCELLED | OUTPATIENT
Start: 2022-02-22

## 2022-02-22 RX ORDER — SODIUM CHLORIDE 9 MG/ML
25 INJECTION, SOLUTION INTRAVENOUS PRN
Status: CANCELLED | OUTPATIENT
Start: 2022-02-22

## 2022-03-10 ENCOUNTER — HOSPITAL ENCOUNTER (OUTPATIENT)
Age: 62
Discharge: HOME | End: 2022-03-12
Payer: MEDICARE

## 2022-03-10 ENCOUNTER — HOSPITAL ENCOUNTER (OUTPATIENT)
Dept: GENERAL RADIOLOGY | Age: 62
Discharge: HOME | End: 2022-03-12
Payer: MEDICARE

## 2022-03-10 DIAGNOSIS — M54.50 LOW BACK PAIN, UNSPECIFIED BACK PAIN LATERALITY, UNSPECIFIED CHRONICITY, UNSPECIFIED WHETHER SCIATICA PRESENT: ICD-10-CM

## 2022-03-10 DIAGNOSIS — M25.552 LEFT HIP PAIN: ICD-10-CM

## 2022-03-10 PROCEDURE — 72110 X-RAY EXAM L-2 SPINE 4/>VWS: CPT

## 2022-03-10 PROCEDURE — 73502 X-RAY EXAM HIP UNI 2-3 VIEWS: CPT

## 2022-03-12 ENCOUNTER — TELEPHONE (OUTPATIENT)
Dept: BARIATRICS/WEIGHT MGMT | Age: 62
End: 2022-03-12

## 2022-03-13 NOTE — TELEPHONE ENCOUNTER
Spoke with pt by phone  Did not reschedule 2/18/22 appt yet b/c of upheaval in her life (mother fell)  2/08/22 TSH 2.74 on LT4 25 mcg M-Sa, 50 mcg Blanchard  She prefers to not decrease her dose at this time (target range is 3.5-5.0 based on her age) b/c of not wanting to feel more fatigue  Plan:  Cont present regimen  Reschedule appt when life permits  SDR  00/44/75

## 2022-04-26 ENCOUNTER — OFFICE VISIT (OUTPATIENT)
Dept: NEUROLOGY | Age: 62
End: 2022-04-26
Payer: MEDICARE

## 2022-04-26 VITALS
SYSTOLIC BLOOD PRESSURE: 110 MMHG | BODY MASS INDEX: 26.99 KG/M2 | WEIGHT: 162 LBS | DIASTOLIC BLOOD PRESSURE: 76 MMHG | HEIGHT: 65 IN

## 2022-04-26 DIAGNOSIS — M54.32 LEFT SIDED SCIATICA: ICD-10-CM

## 2022-04-26 DIAGNOSIS — M54.16 LEFT LUMBAR RADICULITIS: Chronic | ICD-10-CM

## 2022-04-26 DIAGNOSIS — M54.17 LEFT LUMBOSACRAL RADICULOPATHY: Primary | ICD-10-CM

## 2022-04-26 PROCEDURE — 95886 MUSC TEST DONE W/N TEST COMP: CPT | Performed by: PSYCHIATRY & NEUROLOGY

## 2022-04-26 PROCEDURE — 95910 NRV CNDJ TEST 7-8 STUDIES: CPT | Performed by: PSYCHIATRY & NEUROLOGY

## 2022-04-26 NOTE — PROGRESS NOTES
[address] [facility]  Electrodiagnostic Laboratory  *Accredited by the [address] [place] [address] with exemplary status  [address] [facility]  Phone: [phone]  Fax: [phone]    Referring Provider: Calista Vásquez.,*  Primary Care Physician: Lizbeth Westbrook MD  Patient Name: Yajaira Kruger  Patient YOB: 1960  Gender: female  BMI: Body mass index is 26.96 kg/m². Blood pressure 110/76, height 5' 5\" (1.651 m), weight 162 lb (73.5 kg). 4/26/2022    Description of clinical problem: Left lumbar radiculopathy. History of chronic left hip pain, lower back pain; lumbar degenerative disc disease, moderate from L1-L3. Pain Yes   ; Numbness/tingling  Yes; Weakness  No       Brief physical exam:   Sensory deficit No; Weakness No; Atrophy  No; Reflex abnormality No    Brief neurologic exam performed of the left lower extremity is grossly intact strength of 5/5 power in all muscle groups including hip flexion/extension, knee flexion/extension, dorsiflexion/plantarflexion and normal motor tone. DTRs: 1+. No ankle clonus. Plantar is flexor. Sensory: Grossly intact subjectively to light touch and sharp stick testing. Musculoskeletal: No fasciculations or focal muscular atrophy. No foot drop. + hammertoes. Mildly positive left lower extremity straight leg raising test to an angle of 35 degrees approximately. Study Limitations: Pain    Full Name: Katarzyna Adams Gender: Female  MRN: [id number] YOB: 1960      Visit Date: 4/26/2022 08:55  Age: 57 Years 8 Months Old  Examining Physician: Dr. Mauro Polanco   Referring Physician: Dr. Alison Kanner  Technician: Rolly Chacko   Height: 5 feet 5 inch  Weight: 162 lbs  Notes: Lumbar Radiculopathy       Motor NCS      Nerve / Sites Latency Amplitude Amp. 1-2 Distance Lat Diff Velocity Temp.    ms mV % cm ms m/s °C   L Peroneal - EDB      Ankle 5.26 3.3 100 8   31.9      Fib head 11.35 3.0 90.2 25 6.09 41 31.4      Pop fossa 13.28 2.9 88.5 10 1.93 52 31.3   R Peroneal - EDB      Ankle 4.32 4.7 100 8   32.4      Fib head 10.26 4.0 85.1 25 5.94 42 32.4      Pop fossa 12.19 3.9 83.3 10 1.93 52 32.4   L Tibial - AH      Ankle 3.75 5.5 100 8   31.9      Pop fossa 12.03 4.7 84.4 39 8.28 47 31.7       Sensory NCS      Nerve / Sites Onset Lat Peak Lat PP Amp Amp. 1-2 Distance Velocity Temp.    ms ms µV % cm m/s °C   L Sural - Ankle (Calf)      Calf 2.66 3.39 7.9 100 14 53 31.5   L Superficial peroneal - Ankle      Lat leg 2.45 3.33 8.5 100 10 41 31.3   R Superficial peroneal - Ankle      Lat leg 2.71 3.59 5.0 100 10 37 32.5     F  Wave      Nerve F Lat M Lat F-M Lat    ms ms ms   L Peroneal - EDB 50.3 5.5 44.8   L Tibial - AH 54.3 5.7 48.6   R Peroneal - EDB 51.2 4.3 46.9       H Reflex      Nerve Lat Hmax    ms   L Tibial - Soleus 29.9   R Tibial - Soleus 30.0       EMG         EMG Summary Table     Spontaneous MUAP Recruitment   Muscle IA Fib PSW Fasc H.F. Amp Dur. PPP Pattern   L. Tibialis anterior Normal None None None None Normal Normal None Normal   L. Gastrocn (Medial head) Normal None None None None Normal Normal None Normal   L. Flexor digitorum longus Normal None None None None 1+ 1+ None Reduced   L. Extensor digitorum brevis Normal None None None None Giant 2+ None Reduced   L. Abductor hallucis Normal None None None None Giant 2+ None Reduced   L. Vastus lateralis Normal None None None None 2+ 2+ None Reduced   L. Gluteus medius Normal None None None None 1+ 1+ None Reduced   L. Sacral paraspinals  (upper) Normal None None None None Normal Normal None Normal   L. Lumbar paraspinals (low) Incr +/- None None CRDs Normal Normal None *1+ CRD     *CRD= complex repetitive discharge    Summary of Findings:   Nerve conduction studies:   · All nerve conduction studies listed in the table above were normal in latency, amplitude and conduction velocity. Needle EMG:   · Needle EMG was performed using a concentric needle.   · The following abnormalities were seen on needle EMG: Enlarged motor unit potentials in duration and amplitude with decreased recruitment (loss of motor units) in primarily a left lumbosacral (I.e., L5/S1) myotomal distribution of mild-moderate degree. · The left low lumbar paraspinal muscle group shows increased insertional activity and 1+ complex repetitive discharges.  All other muscles tested, as listed in the table above demonstrated normal amplitude, duration, phases and recruitment and no active denervation signs were seen. Diagnostic Interpretation: This study was abnormal.     Electrodiagnosis: There is electrodiagnostic evidence of a chronic left lumbosacral radiculopathy (I.e., primarily L5/S1 myotomal distribution) with chronic denervation of a mild-moderate degree. There is not a prior study for comparison. Clinical correlation is recommended. Technologist:   Physician: Ronaldo Feng MD    Nerve conduction studies and electromyography were performed according to our laboratory policies and procedures which can be provided upon request. All abnormal values are identified in the table.  Laboratory normal values can also be provided upon request.       Cc: Obdulia Chakraborty.,*  Gian James MD

## 2022-07-28 ENCOUNTER — TELEPHONE (OUTPATIENT)
Dept: BARIATRICS/WEIGHT MGMT | Age: 62
End: 2022-07-28

## 2022-07-28 DIAGNOSIS — E03.8 HYPOTHYROIDISM DUE TO HASHIMOTO'S THYROIDITIS: ICD-10-CM

## 2022-07-28 DIAGNOSIS — E06.3 HYPOTHYROIDISM DUE TO HASHIMOTO'S THYROIDITIS: ICD-10-CM

## 2022-07-28 NOTE — TELEPHONE ENCOUNTER
Called patient due to refill request for Levoxyl. No follow up scheduled. Left patient a message to call facility to schedule follow up.

## 2022-08-16 ENCOUNTER — HOSPITAL ENCOUNTER (OUTPATIENT)
Age: 62
Discharge: HOME | End: 2022-08-16
Payer: MEDICARE

## 2022-08-16 DIAGNOSIS — E03.8 HYPOTHYROIDISM DUE TO HASHIMOTO'S THYROIDITIS: ICD-10-CM

## 2022-08-16 DIAGNOSIS — E06.3 HYPOTHYROIDISM DUE TO HASHIMOTO'S THYROIDITIS: ICD-10-CM

## 2022-08-16 LAB — TSH SERPL DL<=0.05 MIU/L-ACNC: 1.94 UIU/ML (ref 0.27–4.2)

## 2022-08-16 PROCEDURE — 36415 COLL VENOUS BLD VENIPUNCTURE: CPT

## 2022-08-16 PROCEDURE — 84443 ASSAY THYROID STIM HORMONE: CPT

## 2022-09-05 RX ORDER — LEVOTHYROXINE SODIUM 25 UG/1
TABLET ORAL
Qty: 116 TABLET | Refills: 3 | OUTPATIENT
Start: 2022-09-05

## 2022-09-08 ENCOUNTER — OFFICE VISIT (OUTPATIENT)
Dept: BARIATRICS/WEIGHT MGMT | Age: 62
End: 2022-09-08
Payer: MEDICARE

## 2022-09-08 VITALS
HEIGHT: 65 IN | TEMPERATURE: 97.1 F | DIASTOLIC BLOOD PRESSURE: 66 MMHG | WEIGHT: 168.6 LBS | SYSTOLIC BLOOD PRESSURE: 120 MMHG | HEART RATE: 84 BPM | BODY MASS INDEX: 28.09 KG/M2

## 2022-09-08 DIAGNOSIS — E03.8 HYPOTHYROIDISM DUE TO HASHIMOTO'S THYROIDITIS: Primary | ICD-10-CM

## 2022-09-08 DIAGNOSIS — E06.3 HYPOTHYROIDISM DUE TO HASHIMOTO'S THYROIDITIS: Primary | ICD-10-CM

## 2022-09-08 PROCEDURE — 99211 OFF/OP EST MAY X REQ PHY/QHP: CPT

## 2022-09-08 PROCEDURE — 99214 OFFICE O/P EST MOD 30 MIN: CPT | Performed by: INTERNAL MEDICINE

## 2022-09-08 RX ORDER — LEVOTHYROXINE SODIUM 25 UG/1
TABLET ORAL
Qty: 90 TABLET | Refills: 3 | Status: SHIPPED | OUTPATIENT
Start: 2022-09-08

## 2022-09-08 NOTE — PATIENT INSTRUCTIONS
Decrease LT4 to 25 mcg, one tablet daily  Have repeat TSH performed in six weeks  Hold all B-vitamin containing supplements for one week before the test

## 2022-09-08 NOTE — PROGRESS NOTES
CC -   Hypothyroidism, post- RYGB    Background -   Last visit:   11/18/21  First visit:   11/04/20    Hypothyroidism  Diagnosed 2010  Due to Hashimoto's   Prefers Levoxyl b/c of belief that it is better absorbed in pts with RYGB (told this by her PCP)  No arrhythmias  + Osteopenia  Has been on Fosamax for three years in the past  Takes her LT4 in the evening    9/21/20 TSH 3.060 on LT4 50mcg daily, increased to 75 mcg daily by PCP  11/04/20                                                    dcrs LT4 to 50 mcg daily M-Sa, none Blanchard  12/29/20 TSH 2.100 on LT4 50 mcg daily M-Sa, none Blanchard, on 5/21/21 decr to one tab M-F, no Sa or Blanchard  06/28/21 TSH 3.010 on LT4 50 mcg daily M-F, none Sa or Blanchard  08/04/21 TSH 1.830 on LT4 50 mcg daily M-F, none Sa or Blanchard, Decr 25 mcg, one tablet daily M-F and two tab daily Sa and Blanchard  10/11/21 TSH 2.200 on LT4 25 mcg, one tablet daily M-F and two tab daily Sa and Blanchard  ______________________    [facility] -  Past Medical History:   Diagnosis Date    Acid reflux     Arthritis     Asthma     Clotting disorder ([facility] [address] 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, NEAL-1 genotype Heterozygote 5G/4G    CRPS (complex regional pain syndrome type I)     Gastric bypass status for obesity     Heart palpitations     History of constipation     History of phlebitis     Hyperlipidemia     Hypothyroidism     Left lumbar radiculitis 4/26/2022    Left lumbosacral radiculopathy 4/26/2022    Lower leg DVT (deep venous thrombosis) (HCC)     Migraines     Varicose veins      Current Outpatient Medications   Medication Sig Dispense Refill    LEVOXYL 25 MCG tablet one tablet daily M-Sa and two tab Blanchard 103 tablet 3    esomeprazole (NEXIUM) 20 MG delayed release capsule Take 1 capsule by mouth daily 90 capsule 1    Loratadine (CLARITIN PO) Take by mouth as needed      celecoxib (CELEBREX) 200 MG capsule Take 200 mg by mouth daily      melatonin 3 MG TABS tablet Take 3 mg by mouth nightly as needed      aspirin 81 MG EC tablet Take 81 mg by mouth daily      Zinc Gluconate 15 MG TABS Take one tablet daily (Patient taking differently: 30 mg Take one tablet daily) 90 tablet 3    azelastine (ASTELIN) 0.1 % nasal spray 2 times daily       metroNIDAZOLE (METROGEL) 1 % gel       ondansetron (ZOFRAN) 4 MG tablet TAKE ONE TABLET BY MOUTH EVERY 6 TO 8 HOURS AS NEEDED FOR NAUSEA. (Patient not taking: Reported on 1/18/2022)      tretinoin microspheres (RETIN-A MICRO) 0.04 % gel       zolpidem (AMBIEN) 5 MG tablet Take 5 mg by mouth nightly. 0    clindamycin-benzoyl peroxide (BENZACLIN) 1-5 % gel       DULoxetine (CYMBALTA) 60 MG extended release capsule Take 60 mg by mouth daily  2    promethazine (PHENERGAN) 25 MG tablet as needed  1    albuterol sulfate  (90 Base) MCG/ACT inhaler Inhale 2 puffs into the lungs every 6 hours as needed for Wheezing      diclofenac sodium 1 % GEL Apply 2 g topically 2 times daily      loteprednol (ALREX) 0.2 % SUSP 1 drop 4 times daily      bisacodyl (DULCOLAX) 5 MG EC tablet Take 5 mg by mouth daily as needed for Constipation      guaiFENesin (MUCINEX) 600 MG extended release tablet Take 600 mg by mouth as needed       Probiotic Product (PROBIOTIC DAILY PO) Take by mouth nightly      Ascorbic Acid (VITAMIN C) 250 MG tablet Take 500 mg by mouth daily       topiramate (TOPAMAX) 25 MG tablet Take 25 mg by mouth nightly       Wheat Dextrin (BENEFIBER) POWD Take by mouth Daily 1 Tablespoons      SUMAtriptan (IMITREX) 100 MG tablet Take 100 mg by mouth once as needed for Migraine      diazepam (VALIUM) 5 MG tablet Take 0.5 tablets by mouth nightly       montelukast (SINGULAIR) 10 MG tablet Take 1 tablet by mouth daily      amphetamine-dextroamphetamine (ADDERALL, 20MG,) 20 MG tablet Take 20 mg by mouth daily . rosuvastatin (CRESTOR) 5 MG tablet Take 5 mg by mouth every other day       diphenhydrAMINE (BENADRYL) 25 MG tablet Take 25 mg by mouth nightly.       estradiol (ESTRACE VAGINAL) 0.1 MG/GM vaginal cream Place 2 g vaginally once a week. cycloSPORINE (RESTASIS) 0.05 % ophthalmic emulsion Place 1 drop into both eyes 2 times daily. amitriptyline (ELAVIL) 10 MG tablet Take 10 mg by mouth nightly. baclofen (LIORESAL) 20 MG tablet Take 20 mg by mouth 2 times daily       Docusate Calcium (STOOL SOFTENER PO) Take 1 capsule by mouth daily       vitamin D (ERGOCALCIFEROL) 54350 UNIT CAPS capsule Take 50,000 Units by mouth once a week       Calcium Citrate-Vitamin D 200-250 MG-UNIT TABS Take 1 tablet by mouth 2 times daily Gummies      therapeutic multivitamin-minerals (THERAGRAN-M) tablet Take 1 tablet by mouth 2 times daily Gummies      Ferrous Fumarate (IRON) 18 MG TBCR Take 2 tablets by mouth daily       Coenzyme Q-10 100 MG CAPS Take 1 capsule by mouth every other day       Magnesium 250 MG TABS Take 500 mg by mouth every evening        No current facility-administered medications for this visit. PE -  Gen : /66 (Site: Left Upper Arm, Position: Sitting, Cuff Size: Large Adult)   Pulse 84   Temp 97.1 °F (36.2 °C) (Temporal)   Ht 5' 4.75\" (1.645 m)   Wt 168 lb 9.6 oz (76.5 kg)   BMI 28.27 kg/m²    WN, WD, NAD  Heart: RRR w/o mgr, no carotid bruits, 4+ LE edema  Lung: Nml resp effort, CTA b/l  Psych: Normal mood   Full affect  Neuro:  Moves all ext well, 2+ bicep reflex  ______________________      HPI & A/P -   Problem 1  - Hypothyroidism  HPI   - See above Background for description      Target TSH for her age and osteopenia is 4.5-5.0      06/28/21 TSH 3.010 on LT4 50 mcg daily M-F, none Blanchard      08/04/21 TSH 1.830 on LT4 50 mcg daily M-F, none Blanchard, Decr 25 mcg, one tablet daily M-F and two tab daily Sa and Blanchard      10/11/21 TSH 2.200 on LT4 25 mcg, one tablet daily M-F and two tab daily Sa and Blanchard; Decrto 25 mcg, one tablet daily M-Sa and two tab Blanchard      2/08/22 TSH 2.74 on LT4 25 mcg M-Sa, 50 mcg Blanchard            She prefered to not decrease her dose at that time (target range is 3.5-5.0 based on her age) b/c of not wanting to risk an increase in fatigue    8/16/22 TSH 1.94 on LT4 25 mcg M-Sa, 50 mcg Blanchard      Symptoms: + chronic constipation, no diarrhea, sensitive to both cold and heat, palpitations episodes remain low in freq,  no tremors,  no sweating,  + fatigue  Assessment  - Controlled but TSH is below preferred target range of 3.5-5; will decr LT4  Plan   -   Decrease LT4 to 25 mcg, one tablet daily  Have repeat TSH performed in six weeks  Hold all B-vitamin containing supplements for one week before the test    Problem 2  - Osteopenia  HPI   - See above Background for description    12/14/18 DEXA  T-scores:  LFN -1.4,  RFN -1.7,  L1-4 +0.1, L1 -0.5    10 yr Fraxx Risk 8.8% Major, 0.9% Hip    Has chose to take D3 50k weekly rather than 5k daily  Assessment  - Controlled  Plan   - Cont with Ca (1500 mg/day) and D3 50,000 IU weekly supplements      Follow up  Return to see me in 6 months    Medication management: LT4    Marcus Peña MD  Endocrinology/Obesity  9/8/22

## 2022-09-09 ENCOUNTER — HOSPITAL ENCOUNTER (OUTPATIENT)
Age: 62
Discharge: HOME | End: 2022-09-09
Payer: MEDICARE

## 2022-09-09 DIAGNOSIS — E03.8 HYPOTHYROIDISM DUE TO HASHIMOTO'S THYROIDITIS: ICD-10-CM

## 2022-09-09 DIAGNOSIS — K91.2 MALNUTRITION FOLLOWING GASTROINTESTINAL SURGERY: ICD-10-CM

## 2022-09-09 DIAGNOSIS — E06.3 HYPOTHYROIDISM DUE TO HASHIMOTO'S THYROIDITIS: ICD-10-CM

## 2022-09-09 DIAGNOSIS — K21.00 GASTROESOPHAGEAL REFLUX DISEASE WITH ESOPHAGITIS, UNSPECIFIED WHETHER HEMORRHAGE: ICD-10-CM

## 2022-09-09 DIAGNOSIS — E60 ZINC DEFICIENCY: Primary | ICD-10-CM

## 2022-09-09 DIAGNOSIS — E60 ZINC DEFICIENCY: ICD-10-CM

## 2022-09-09 LAB
ALBUMIN SERPL-MCNC: 4.1 G/DL (ref 3.5–5.2)
ALP BLD-CCNC: 110 U/L (ref 35–104)
ALT SERPL-CCNC: 18 U/L (ref 0–32)
ANION GAP SERPL CALCULATED.3IONS-SCNC: 7 MMOL/L (ref 7–16)
AST SERPL-CCNC: 20 U/L (ref 0–31)
BILIRUB SERPL-MCNC: 0.5 MG/DL (ref 0–1.2)
BUN BLDV-MCNC: 12 MG/DL (ref 6–23)
CALCIUM SERPL-MCNC: 9.2 MG/DL (ref 8.6–10.2)
CHLORIDE BLD-SCNC: 104 MMOL/L (ref 98–107)
CHOLESTEROL, TOTAL: 183 MG/DL (ref 0–199)
CO2: 27 MMOL/L (ref 22–29)
CREAT SERPL-MCNC: 0.8 MG/DL (ref 0.5–1)
FERRITIN: 39 NG/ML
FOLATE: >20 NG/ML (ref 4.8–24.2)
GFR AFRICAN AMERICAN: >60
GFR NON-AFRICAN AMERICAN: >60 ML/MIN/1.73
GLUCOSE BLD-MCNC: 92 MG/DL (ref 74–99)
HCT VFR BLD CALC: 40.9 % (ref 34–48)
HEMOGLOBIN: 13.6 G/DL (ref 11.5–15.5)
MCH RBC QN AUTO: 31.6 PG (ref 26–35)
MCHC RBC AUTO-ENTMCNC: 33.3 % (ref 32–34.5)
MCV RBC AUTO: 94.9 FL (ref 80–99.9)
PDW BLD-RTO: 12.9 FL (ref 11.5–15)
PLATELET # BLD: 257 E9/L (ref 130–450)
PMV BLD AUTO: 9.7 FL (ref 7–12)
POTASSIUM SERPL-SCNC: 4.3 MMOL/L (ref 3.5–5)
PREALBUMIN: 20 MG/DL (ref 20–40)
RBC # BLD: 4.31 E12/L (ref 3.5–5.5)
SODIUM BLD-SCNC: 138 MMOL/L (ref 132–146)
TOTAL PROTEIN: 6.6 G/DL (ref 6.4–8.3)
TRIGL SERPL-MCNC: 85 MG/DL (ref 0–149)
VITAMIN B-12: 526 PG/ML (ref 211–946)
VITAMIN D 25-HYDROXY: 60 NG/ML (ref 30–100)
WBC # BLD: 4.2 E9/L (ref 4.5–11.5)

## 2022-09-09 PROCEDURE — 84630 ASSAY OF ZINC: CPT

## 2022-09-09 PROCEDURE — 36415 COLL VENOUS BLD VENIPUNCTURE: CPT

## 2022-09-09 PROCEDURE — 82306 VITAMIN D 25 HYDROXY: CPT

## 2022-09-09 PROCEDURE — 84255 ASSAY OF SELENIUM: CPT

## 2022-09-09 PROCEDURE — 84134 ASSAY OF PREALBUMIN: CPT

## 2022-09-09 PROCEDURE — 80053 COMPREHEN METABOLIC PANEL: CPT

## 2022-09-09 PROCEDURE — 82525 ASSAY OF COPPER: CPT

## 2022-09-09 PROCEDURE — 82728 ASSAY OF FERRITIN: CPT

## 2022-09-09 PROCEDURE — 82607 VITAMIN B-12: CPT

## 2022-09-09 PROCEDURE — 84425 ASSAY OF VITAMIN B-1: CPT

## 2022-09-09 PROCEDURE — 84478 ASSAY OF TRIGLYCERIDES: CPT

## 2022-09-09 PROCEDURE — 82465 ASSAY BLD/SERUM CHOLESTEROL: CPT

## 2022-09-09 PROCEDURE — 85027 COMPLETE CBC AUTOMATED: CPT

## 2022-09-09 PROCEDURE — 82746 ASSAY OF FOLIC ACID SERUM: CPT

## 2022-09-13 LAB
COPPER: 144.6 UG/DL (ref 80–155)
SELENIUM: 132 UG/L (ref 23–190)
ZINC: 83.7 UG/DL (ref 60–120)

## 2022-09-14 LAB — VITAMIN B1 WHOLE BLOOD: 138 NMOL/L (ref 70–180)

## 2022-09-29 ENCOUNTER — OFFICE VISIT (OUTPATIENT)
Dept: BARIATRICS/WEIGHT MGMT | Age: 62
End: 2022-09-29
Payer: MEDICARE

## 2022-09-29 VITALS
SYSTOLIC BLOOD PRESSURE: 180 MMHG | HEIGHT: 65 IN | DIASTOLIC BLOOD PRESSURE: 88 MMHG | TEMPERATURE: 97.3 F | BODY MASS INDEX: 27.82 KG/M2 | WEIGHT: 167 LBS | HEART RATE: 83 BPM | RESPIRATION RATE: 20 BRPM

## 2022-09-29 DIAGNOSIS — E44.1 MILD PROTEIN-CALORIE MALNUTRITION (HCC): ICD-10-CM

## 2022-09-29 DIAGNOSIS — K91.2 MALNUTRITION FOLLOWING GASTROINTESTINAL SURGERY: Primary | ICD-10-CM

## 2022-09-29 PROCEDURE — 99213 OFFICE O/P EST LOW 20 MIN: CPT | Performed by: NURSE PRACTITIONER

## 2022-09-29 PROCEDURE — 99211 OFF/OP EST MAY X REQ PHY/QHP: CPT

## 2022-09-29 NOTE — PROGRESS NOTES
Patient is 22 yr RYGB. Water intake is around 32 oz daily. Protein through shakes and foods. Vitamin intake is good. Bowel movements are good. Some constipation.

## 2022-09-29 NOTE — PROGRESS NOTES
Shadia Dowd  9/29/2022  [address]     Edwin-en- Y Gastric Bypass  23 Year Post-Operative Follow-up     Shadia Dowd is a 60 y.o. female who is 19 years post Laparoscopic Edwin-en-Y Gastric Bypass surgery. Reports that she had a bad year, her mom passed away and she got Covid. She is not having swallowing difficulty. Patient denies nausea and vomiting. Patient reports gastric reflux symptoms, she reports that it is better since Dr. Lilibeth Jarrell put her on nexium last year. Bowel movements are normal per patient. Patient is compliant all of the time with the iron supplement and multivitamins and calcium + Vit D, she is not taking bariatric specific vitamins. She is not meeting fluid recommendations of at least 64 ounces per day and is not meeting protein recommendations. She reports that she does drink a protein drink daily. She  is not exercising: no regular exercise. Patient is  taking fiber supplements, which include benefiber, stool softener and 4 fiber gummies daily. She also reports that steel cut oats help her to go to the bathroom as well. Weight=167 lb (75.8 kg)  Today's weight represents a total weight loss of 63 pounds since surgery with 44 pounds regained since the lowest weight. Prior to Admission medications    Medication Sig Start Date End Date Taking?  Authorizing Provider   Galcanezumab-[facility]) Inject into the skin   Yes Historical Provider, MD   LEVOXYL 25 MCG tablet one tablet daily 9/8/22  Yes Nancy Santo MD   esomeprazole ([address]) 20 MG delayed release capsule Take 1 capsule by mouth daily 11/18/21  Yes India Magallon MD   Loratadine (CLARITIN PO) Take by mouth as needed   Yes Historical Provider, MD   celecoxib (CELEBREX) 200 MG capsule Take 200 mg by mouth daily   Yes Historical Provider, MD   melatonin 3 MG TABS tablet Take 3 mg by mouth nightly as needed   Yes Historical Provider, MD   aspirin 81 MG EC tablet Take 81 mg by mouth daily   Yes Historical Provider, MD   Zinc Gluconate 15 MG TABS Take one tablet daily  Patient taking differently: 30 mg Take one tablet daily 11/4/20  Yes Real Naas, MD   azelastine (ASTELIN) 0.1 % nasal spray 2 times daily  7/21/20  Yes Historical Provider, MD   metroNIDAZOLE (METROGEL) 1 % gel  8/12/20  Yes Historical Provider, MD   ondansetron (ZOFRAN) 4 MG tablet TAKE ONE TABLET BY MOUTH EVERY 6 TO 8 HOURS AS NEEDED FOR NAUSEA. 7/17/20  Yes Historical Provider, MD   tretinoin microspheres (RETIN-A MICRO) 0.04 % gel  9/25/20  Yes Historical Provider, MD   zolpidem (AMBIEN) 5 MG tablet Take 5 mg by mouth nightly.  10/11/19  Yes Historical Provider, MD   clindamycin-benzoyl peroxide (BENZACLIN) 1-5 % gel  1/9/19  Yes Historical Provider, MD   DULoxetine (CYMBALTA) 60 MG extended release capsule Take 60 mg by mouth daily 1/3/19  Yes Historical Provider, MD   promethazine (PHENERGAN) 25 MG tablet as needed 12/21/18  Yes Historical Provider, MD   albuterol sulfate  (90 Base) MCG/ACT inhaler Inhale 2 puffs into the lungs every 6 hours as needed for Wheezing   Yes Historical Provider, MD   diclofenac sodium 1 % GEL Apply 2 g topically 2 times daily   Yes Historical Provider, MD   loteprednol (ALREX) 0.2 % SUSP 1 drop 4 times daily   Yes Historical Provider, MD   bisacodyl (DULCOLAX) 5 MG EC tablet Take 5 mg by mouth daily as needed for Constipation   Yes Historical Provider, MD   guaiFENesin (MUCINEX) 600 MG extended release tablet Take 600 mg by mouth as needed    Yes Historical Provider, MD   Probiotic Product (PROBIOTIC DAILY PO) Take by mouth nightly   Yes Historical Provider, MD   Ascorbic Acid (VITAMIN C) 250 MG tablet Take 500 mg by mouth daily    Yes Historical Provider, MD   topiramate (TOPAMAX) 25 MG tablet Take 25 mg by mouth nightly    Yes Historical Provider, MD   Wheat Dextrin (BENEFIBER) POWD Take by mouth Daily 1 Tablespoons   Yes Historical Provider, MD   SUMAtriptan (IMITREX) 100 MG tablet Take 100 mg by mouth once as needed for Migraine   Yes Historical Provider, MD   diazepam (VALIUM) 5 MG tablet Take 0.5 tablets by mouth nightly  3/19/17  Yes Historical Provider, MD   montelukast (SINGULAIR) 10 MG tablet Take 1 tablet by mouth daily 3/14/17  Yes Historical Provider, MD   amphetamine-dextroamphetamine (ADDERALL) 20 MG tablet Take 20 mg by mouth daily . Yes Historical Provider, MD   rosuvastatin (CRESTOR) 5 MG tablet Take 5 mg by mouth every other day    Yes Historical Provider, MD   diphenhydrAMINE (BENADRYL) 25 MG tablet Take 25 mg by mouth nightly. Yes Historical Provider, MD   estradiol (ESTRACE) 0.1 MG/GM vaginal cream Place 2 g vaginally once a week. Yes Historical Provider, MD   cycloSPORINE (RESTASIS) 0.05 % ophthalmic emulsion Place 1 drop into both eyes 2 times daily. Yes Historical Provider, MD   amitriptyline (ELAVIL) 10 MG tablet Take 10 mg by mouth nightly.      Yes Historical Provider, MD   baclofen (LIORESAL) 20 MG tablet Take 20 mg by mouth 2 times daily    Yes Historical Provider, MD   Docusate Calcium (STOOL SOFTENER PO) Take 1 capsule by mouth daily    Yes Historical Provider, MD   vitamin D (ERGOCALCIFEROL) 61469 UNIT CAPS capsule Take 50,000 Units by mouth once a week    Yes Historical Provider, MD   Calcium Citrate-Vitamin D 200-250 MG-UNIT TABS Take 1 tablet by mouth 2 times daily Gummies   Yes Historical Provider, MD   therapeutic multivitamin-minerals (THERAGRAN-M) tablet Take 1 tablet by mouth 2 times daily Gummies   Yes Historical Provider, MD   Ferrous Fumarate (IRON) 18 MG TBCR Take 2 tablets by mouth daily    Yes Historical Provider, MD   Coenzyme Q-10 100 MG CAPS Take 1 capsule by mouth every other day    Yes Historical Provider, MD   Magnesium 250 MG TABS Take 500 mg by mouth every evening    Yes Historical Provider, MD        Allergies   Allergen Reactions    Dust Mite Extract     Other Other (See Comments)    Doxycycline Diarrhea    Ketorolac Tromethamine Hives    Lyrica [Pregabalin] Itching, Swelling and Other (See Comments)     Memory Lapses     Morphine Other (See Comments)     Headache    Pcn [Penicillins]     Fentanyl Itching           Past Medical History:   Diagnosis Date    Acid reflux     Arthritis     Asthma     Clotting disorder ([facility]) 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, NEAL-1 genotype Heterozygote 5G/4G    CRPS (complex regional pain syndrome type I)     Gastric bypass status for obesity     Heart palpitations     History of constipation     History of phlebitis     Hyperlipidemia     Hypothyroidism     Left lumbar radiculitis 4/26/2022    Left lumbosacral radiculopathy 4/26/2022    Lower leg DVT (deep venous thrombosis) ([facility] [address]     Migraines     Varicose veins        Past Surgical History:   Procedure Laterality Date    ABDOMEN SURGERY      COLONOSCOPY  09/27/2017    ENDOSCOPY, COLON, DIAGNOSTIC      HERNIA REPAIR  07/02/2004    Dr. Keesha Villa - Repair of Incisional Hernia with Composix Dual Mesh    Rufus Alvarado, 7/12/16    Dorsal column stimulator [facility], Battery change stimulator 7/12/2016    [place] EGD TRANSORAL BIOPSY SINGLE/MULTIPLE N/A 9/24/2018    EGD ESOPHAGOGASTRODUODENOSCOPY performed by Abigail Sandoval MD at [facility], REPAIR PARAESOPHAGEAL HERNIA, INCL FUNDOPLASTY W/O MESH N/A 11/19/2018    ROBOTIC XI ASSISTED LAPAROSCOPIC HIATAL HERNIA REPAIR WITH MESH performed by Abigail Sandoval MD at [address]  09/30/2003    Dr. Yumiko Marc - [facility] - Open RYGB     Lawo Naimae  06/13/2003    Dr. Yumiko Marc [facility] - Findings:  Moderate gastritis with linear erosions, Small Hiatal Hernia with columnar epithelium extending up into the distal esophagus       Current Outpatient Medications   Medication Sig Dispense Refill    Galcanezumab-gnlm (EMGALITY SC) Inject into the skin      LEVOXYL 25 MCG tablet one tablet daily 90 tablet 3    esomeprazole (NEXIUM) 20 MG delayed release capsule Take 1 capsule by mouth daily 90 capsule 1    Loratadine (CLARITIN PO) Take by mouth as needed      celecoxib (CELEBREX) 200 MG capsule Take 200 mg by mouth daily      melatonin 3 MG TABS tablet Take 3 mg by mouth nightly as needed      aspirin 81 MG EC tablet Take 81 mg by mouth daily      Zinc Gluconate 15 MG TABS Take one tablet daily (Patient taking differently: 30 mg Take one tablet daily) 90 tablet 3    azelastine (ASTELIN) 0.1 % nasal spray 2 times daily       metroNIDAZOLE (METROGEL) 1 % gel       ondansetron (ZOFRAN) 4 MG tablet TAKE ONE TABLET BY MOUTH EVERY 6 TO 8 HOURS AS NEEDED FOR NAUSEA.      tretinoin microspheres (RETIN-A MICRO) 0.04 % gel       zolpidem (AMBIEN) 5 MG tablet Take 5 mg by mouth nightly.   0    clindamycin-benzoyl peroxide (BENZACLIN) 1-5 % gel       DULoxetine (CYMBALTA) 60 MG extended release capsule Take 60 mg by mouth daily  2    promethazine (PHENERGAN) 25 MG tablet as needed  1    albuterol sulfate  (90 Base) MCG/ACT inhaler Inhale 2 puffs into the lungs every 6 hours as needed for Wheezing      diclofenac sodium 1 % GEL Apply 2 g topically 2 times daily      loteprednol (ALREX) 0.2 % SUSP 1 drop 4 times daily      bisacodyl (DULCOLAX) 5 MG EC tablet Take 5 mg by mouth daily as needed for Constipation      guaiFENesin (MUCINEX) 600 MG extended release tablet Take 600 mg by mouth as needed       Probiotic Product (PROBIOTIC DAILY PO) Take by mouth nightly      Ascorbic Acid (VITAMIN C) 250 MG tablet Take 500 mg by mouth daily       topiramate (TOPAMAX) 25 MG tablet Take 25 mg by mouth nightly       Wheat Dextrin (BENEFIBER) POWD Take by mouth Daily 1 Tablespoons      SUMAtriptan (IMITREX) 100 MG tablet Take 100 mg by mouth once as needed for Migraine      diazepam (VALIUM) 5 MG tablet Take 0.5 tablets by mouth nightly       montelukast (SINGULAIR) 10 MG tablet Take 1 tablet by mouth daily      amphetamine-dextroamphetamine (ADDERALL) 20 MG tablet Take 20 mg by mouth daily . rosuvastatin (CRESTOR) 5 MG tablet Take 5 mg by mouth every other day       diphenhydrAMINE (BENADRYL) 25 MG tablet Take 25 mg by mouth nightly. estradiol (ESTRACE) 0.1 MG/GM vaginal cream Place 2 g vaginally once a week. cycloSPORINE (RESTASIS) 0.05 % ophthalmic emulsion Place 1 drop into both eyes 2 times daily. amitriptyline (ELAVIL) 10 MG tablet Take 10 mg by mouth nightly. baclofen (LIORESAL) 20 MG tablet Take 20 mg by mouth 2 times daily       Docusate Calcium (STOOL SOFTENER PO) Take 1 capsule by mouth daily       vitamin D (ERGOCALCIFEROL) 40897 UNIT CAPS capsule Take 50,000 Units by mouth once a week       Calcium Citrate-Vitamin D 200-250 MG-UNIT TABS Take 1 tablet by mouth 2 times daily Gummies      therapeutic multivitamin-minerals (THERAGRAN-M) tablet Take 1 tablet by mouth 2 times daily Gummies      Ferrous Fumarate (IRON) 18 MG TBCR Take 2 tablets by mouth daily       Coenzyme Q-10 100 MG CAPS Take 1 capsule by mouth every other day       Magnesium 250 MG TABS Take 500 mg by mouth every evening        No current facility-administered medications for this visit.        Allergies   Allergen Reactions    Dust Mite Extract     Other Other (See Comments)    Doxycycline Diarrhea    Ketorolac Tromethamine Hives    Lyrica [Pregabalin] Itching, Swelling and Other (See Comments)     Memory Lapses     Morphine Other (See Comments)     Headache    Pcn [Penicillins]     Fentanyl Itching       Family History   Problem Relation Age of Onset    Rheumatologic Disease Mother         Connective tissue disease    Heart Disease Mother     Other Mother 79        Pacemaker, CMP, Pulmonary HTN       Social History     Socioeconomic History    Marital status:      Spouse name: Not on file    Number of children: Not on file    Years of education: Not on file    Highest education level: Not on file   Occupational History    Not on file   Tobacco Use    Smoking status: Never    Smokeless tobacco: Never   Vaping Use    Vaping Use: Never used   Substance and Sexual Activity    Alcohol use: Yes     Comment: socially    Drug use: No    Sexual activity: Yes   Other Topics Concern    Not on file   Social History Narrative    Not on file     Social Determinants of Health     Financial Resource Strain: Not on file   Food Insecurity: Not on file   Transportation Needs: Not on file   Physical Activity: Not on file   Stress: Not on file   Social Connections: Not on file   Intimate Partner Violence: Not on file   Housing Stability: Not on file       A complete 10 system review was performed and are otherwise negative unless mentioned in the above HPI. Specific negatives are listed below but may not include all those reviewed.     General ROS: negative obtundation, AMS  ENT ROS: negative rhinorrhea, epistaxis  Allergy and Immunology ROS: negative itchy/watery eyes or nasal congestion  Hematological and Lymphatic ROS: negative spontaneous bleeding or bruising  Endocrine ROS: negative  lethargy, mood swings, palpitations or polydipsia/polyuria  Respiratory ROS: negative sputum changes, stridor, tachypnea or wheezing  Cardiovascular ROS: negative for - loss of consciousness, murmur or orthopnea  Gastrointestinal ROS: negative for - hematochezia or hematemesis  Genito-Urinary ROS: negative for -  genital discharge or hematuria  Musculoskeletal ROS: negative for - focal weakness, gangrene  Psych/Neuro ROS: negative for - visual or auditory hallucinations, suicidal ideation        Physical exam:   BP (!) 180/88 (Site: Right Lower Arm, Position: Sitting, Cuff Size: Medium Adult)   Pulse 83   Temp 97.3 °F (36.3 °C) (Temporal)   Resp 20   Ht 5' 5\" (1.651 m)   Wt 167 lb (75.8 kg)   BMI 27.79 kg/m²    General appearance: alert, appears stated age and cooperative  Head: Normocephalic, without obvious abnormality, atraumatic  Neck: no adenopathy, no carotid bruit, no JVD, supple, symmetrical, trachea midline and thyroid not enlarged, symmetric, no tenderness/mass/nodules  Lungs: clear to auscultation bilaterally  Heart: regular rate and rhythm  Abdomen: soft, non-tender; bowel sounds normal; no masses,  no organomegaly  Extremities: extremities normal, atraumatic, no cyanosis or edema    Assessment: Post Edwin-en- Y Gastric Bypass. She does complain of GERD,  does not have sleep apnea,  does not have diabetes,  does not have hypertension off medical treatment. Cholesterol and triglycerides are normal.    1. Malnutrition following gastrointestinal surgery    - CBC; Future  - Comprehensive Metabolic Panel; Future  - Ferritin; Future  - Triglyceride; Future  - Cholesterol, Total; Future  - Zinc; Future  - Vitamin B12; Future  - Folate; Future  - Vitamin B1; Future  - Vitamin D 25 Hydroxy; Future  - Prealbumin; Future  - Copper, Serum; Future  - Selenium serum; Future    2. Mild protein-calorie malnutrition ([address]     - Cholesterol, Total; Future      Plan:  Continue to eat a high protein, low calorie diet, eat small portions very slowly and chew well before swallowing. Drink plenty of water and fluids. Make sure to use fiber to keep the bowels regular. Try to exercise 7 days per week, maintain adequate variety and balance and increase intake of: proteins. Always notify the clinic if you have any medical problems. Follow up in 12  months.       Physician Signature: Electronically signed by ZEYAD Partida CNP

## 2022-09-29 NOTE — PATIENT INSTRUCTIONS
Please continue to take your vitamin and mineral supplements as instructed. If you received a blood work prescription today for laboratory monitoring due prior to your next routine follow-up visit, please have this blood work obtained 10 to 14 days prior to your next visit. It is important to fast for 12 hours prior to routine weight loss surgery blood work, EXCEPT for drinking water, to ensure accuracy of results. Please report nausea, vomiting, abdominal pain, or any other problems you experience to your surgeon. For problems related to weight loss surgery, it is best to go to [address] [place] [address] Emergency Department and have your surgeon paged. Recommend 60 - 80 grams of protein daily.

## 2022-10-06 ENCOUNTER — HOSPITAL ENCOUNTER (OUTPATIENT)
Age: 62
Discharge: HOME | End: 2022-10-06
Payer: MEDICARE

## 2022-10-06 DIAGNOSIS — E03.8 HYPOTHYROIDISM DUE TO HASHIMOTO'S THYROIDITIS: ICD-10-CM

## 2022-10-06 DIAGNOSIS — E06.3 HYPOTHYROIDISM DUE TO HASHIMOTO'S THYROIDITIS: ICD-10-CM

## 2022-10-06 LAB — TSH SERPL DL<=0.05 MIU/L-ACNC: 2.18 UIU/ML (ref 0.27–4.2)

## 2022-10-06 PROCEDURE — 36415 COLL VENOUS BLD VENIPUNCTURE: CPT

## 2022-10-06 PROCEDURE — 84443 ASSAY THYROID STIM HORMONE: CPT

## 2022-10-14 ENCOUNTER — TELEPHONE (OUTPATIENT)
Dept: VASCULAR SURGERY | Age: 62
End: 2022-10-14

## 2022-10-17 ENCOUNTER — OFFICE VISIT (OUTPATIENT)
Dept: VASCULAR SURGERY | Age: 62
End: 2022-10-17
Payer: COMMERCIAL

## 2022-10-17 VITALS — SYSTOLIC BLOOD PRESSURE: 118 MMHG | DIASTOLIC BLOOD PRESSURE: 74 MMHG

## 2022-10-17 DIAGNOSIS — I73.00 RAYNAUD'S DISEASE WITHOUT GANGRENE: Primary | ICD-10-CM

## 2022-10-17 PROCEDURE — 99204 OFFICE O/P NEW MOD 45 MIN: CPT | Performed by: SURGERY

## 2022-10-17 NOTE — PROGRESS NOTES
Vascular Surgery Outpatient Consultation    Reason for Consult : Finger cyanosis    PCP : Ayla Thompson MD  Pain Management : Dr Ann Aguilar:    The patient is a 60 y.o. female who states is here in regards to discoloration, pain in her right hand. She has known issues with her right upper extremity since 1990 after multiple injuries. She has been following with Pain management in regards to these issues. She has known complex regional pain syndrome and reflex sympathetic dystropy. Since about 10/2021 she has noticed more issues with cyanosis of her finger tips and sometimes palm of her hand R > L. It has gotten progressively worse. Gets worse if at the computer. If she puts gloves on, or wash hands with warm water it helps. It is worse with cold exposure. The pain associated with this is a 7/10 at its worst, and it lasts about 1-2 hour. It is a burning pain and tingling. She has pain in her left hip and back and radiates down to her left leg laterally to there foot. She is not a smoker. She is not a DM. She does have a hx of L LE DVT when she was pregnant 1985, 1987. Per her report she has had multiple episodes of recurrence and she was on coumadin in tx for this. She last had 1995 after work injury. She was an LPN. She has hashimoto's, Sjogren's.        ROS : All others Negative if blank [], Positive if [x]  General Urinary   [] Fevers [] Hematuria   [] Chills [] Dysuria   [] Weight Loss Vascular   Skin [] Claudication   [] Tissue Loss [] Rest Pain   Eyes Neurologic   [x] Wears Glasses/Contacts [] Stroke/TIA   [] Vision Changes [] Focal weakness   Respiratory [] Slurred Speech    [] Shortness of breath ENT   Cardiovascular [] Difficulty swallowing   [] Chest Pain Endocrine    [] Shortness of breath with exertion [] Increased Thirst   Gastrointestinal    [] Abdominal Pain    [] Melena   [] Hematochezia         Past Medical History:        Diagnosis Date    Acid reflux     Arthritis     Asthma     Clotting disorder ([facility] [address] 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, NEAL-1 genotype Heterozygote 5G/4G    CRPS (complex regional pain syndrome type I)     Gastric bypass status for obesity     Heart palpitations     History of constipation     History of phlebitis     Hyperlipidemia     Hypothyroidism     Left lumbar radiculitis 4/26/2022    Left lumbosacral radiculopathy 4/26/2022    Lower leg DVT (deep venous thrombosis) ([facility] [address]     Migraines     Varicose veins      Past Surgical History:        Procedure Laterality Date    ABDOMEN SURGERY      COLONOSCOPY  09/27/2017    ENDOSCOPY, COLON, DIAGNOSTIC      HERNIA REPAIR  07/02/2004    Dr. Jovany Sainz - Repair of Incisional Hernia with Composix Dual Mesh    Rufus Alvarado 7/12/16    Dorsal column stimulator [facility], Battery change stimulator 7/12/2016    [place] EGD TRANSORAL BIOPSY SINGLE/MULTIPLE N/A 9/24/2018    EGD ESOPHAGOGASTRODUODENOSCOPY performed by Deanne Garsia MD at [facility], REPAIR PARAESOPHAGEAL HERNIA, INCL FUNDOPLASTY W/O MESH N/A 11/19/2018    ROBOTIC XI ASSISTED LAPAROSCOPIC HIATAL HERNIA REPAIR WITH MESH performed by Deanne Garsia MD at [address]  09/30/2003    Dr. Teresita Olivares  06/13/2003    Dr. Wilman Rosario [facility] - Findings:  Moderate gastritis with linear erosions, Small Hiatal Hernia with columnar epithelium extending up into the distal esophagus     Current Medications:   Current Outpatient Medications   Medication Sig Dispense Refill    Galcanezumab-gnlm (EMGALITY SC) Inject into the skin      LEVOXYL 25 MCG tablet one tablet daily 90 tablet 3    esomeprazole (NEXIUM) 20 MG delayed release capsule Take 1 capsule by mouth daily 90 capsule 1    Loratadine (CLARITIN PO) Take by mouth as needed      celecoxib (CELEBREX) 200 MG capsule Take 200 mg by mouth daily      melatonin 3 MG TABS tablet Take 3 mg by mouth nightly as needed      aspirin 81 MG EC tablet Take 81 mg by mouth daily      Zinc Gluconate 15 MG TABS Take one tablet daily (Patient taking differently: 30 mg Take one tablet daily) 90 tablet 3    azelastine (ASTELIN) 0.1 % nasal spray 2 times daily       metroNIDAZOLE (METROGEL) 1 % gel       ondansetron (ZOFRAN) 4 MG tablet TAKE ONE TABLET BY MOUTH EVERY 6 TO 8 HOURS AS NEEDED FOR NAUSEA.      tretinoin microspheres (RETIN-A MICRO) 0.04 % gel       zolpidem (AMBIEN) 5 MG tablet Take 5 mg by mouth nightly. 0    clindamycin-benzoyl peroxide (BENZACLIN) 1-5 % gel       DULoxetine (CYMBALTA) 60 MG extended release capsule Take 60 mg by mouth daily  2    promethazine (PHENERGAN) 25 MG tablet as needed  1    albuterol sulfate  (90 Base) MCG/ACT inhaler Inhale 2 puffs into the lungs every 6 hours as needed for Wheezing      diclofenac sodium 1 % GEL Apply 2 g topically 2 times daily      loteprednol (ALREX) 0.2 % SUSP 1 drop 4 times daily      bisacodyl (DULCOLAX) 5 MG EC tablet Take 5 mg by mouth daily as needed for Constipation      guaiFENesin (MUCINEX) 600 MG extended release tablet Take 600 mg by mouth as needed       Probiotic Product (PROBIOTIC DAILY PO) Take by mouth nightly      Ascorbic Acid (VITAMIN C) 250 MG tablet Take 500 mg by mouth daily       Wheat Dextrin (BENEFIBER) POWD Take by mouth Daily 1 Tablespoons      SUMAtriptan (IMITREX) 100 MG tablet Take 100 mg by mouth once as needed for Migraine      diazepam (VALIUM) 5 MG tablet Take 0.5 tablets by mouth nightly       montelukast (SINGULAIR) 10 MG tablet Take 1 tablet by mouth daily      amphetamine-dextroamphetamine (ADDERALL) 20 MG tablet Take 20 mg by mouth daily .       rosuvastatin (CRESTOR) 5 MG tablet Take 5 mg by mouth every other day       diphenhydrAMINE (BENADRYL) 25 MG tablet Take 25 mg by mouth nightly. estradiol (ESTRACE) 0.1 MG/GM vaginal cream Place 2 g vaginally once a week. cycloSPORINE (RESTASIS) 0.05 % ophthalmic emulsion Place 1 drop into both eyes 2 times daily. amitriptyline (ELAVIL) 10 MG tablet Take 20 mg by mouth nightly      baclofen (LIORESAL) 20 MG tablet Take 20 mg by mouth 2 times daily       Docusate Calcium (STOOL SOFTENER PO) Take 1 capsule by mouth daily       vitamin D (ERGOCALCIFEROL) 19049 UNIT CAPS capsule Take 50,000 Units by mouth once a week       Calcium Citrate-Vitamin D 200-250 MG-UNIT TABS Take 1 tablet by mouth 2 times daily Gummies      therapeutic multivitamin-minerals (THERAGRAN-M) tablet Take 1 tablet by mouth 2 times daily Gummies      Ferrous Fumarate (IRON) 18 MG TBCR Take 2 tablets by mouth daily       Coenzyme Q-10 100 MG CAPS Take 1 capsule by mouth every other day       Magnesium 250 MG TABS Take 500 mg by mouth every evening        No current facility-administered medications for this visit.      Allergies:  Dust mite extract, Other, Doxycycline, Ketorolac tromethamine, Lyrica [pregabalin], Morphine, Pcn [penicillins], and Fentanyl  Social History     Socioeconomic History    Marital status:      Spouse name: Not on file    Number of children: Not on file    Years of education: Not on file    Highest education level: Not on file   Occupational History    Not on file   Tobacco Use    Smoking status: Never    Smokeless tobacco: Never   Vaping Use    Vaping Use: Never used   Substance and Sexual Activity    Alcohol use: Yes     Comment: socially    Drug use: No    Sexual activity: Yes   Other Topics Concern    Not on file   Social History Narrative    Not on file     Social Determinants of Health     Financial Resource Strain: Not on file   Food Insecurity: Not on file   Transportation Needs: Not on file   Physical Activity: Not on file   Stress: Not on file   Social Connections: Not on file   Intimate Partner Violence: Not on file   Housing Stability: Not on file        Family History   Problem Relation Age of Onset    Rheumatologic Disease Mother         Connective tissue disease    Heart Disease Mother     Other Mother 79        Pacemaker, CMP, Pulmonary HTN     Labs  Lab Results   Component Value Date    WBC 4.2 (L) 09/09/2022    HGB 13.6 09/09/2022    HCT 40.9 09/09/2022     09/09/2022    PROTIME 11.9 09/27/2017    INR 1.0 09/27/2017    K 4.3 09/09/2022    BUN 12 09/09/2022    CREATININE 0.8 09/09/2022     PHYSICAL EXAM:    /74   CONSTITUTIONAL:   Awake, alert, cooperative  PSYCHIATRIC :  Oriented to time, place and person     Appropriate insight to disease process  EYES: Lids and lashes normal  ENT:  External ears and nose without lesions   Hearing deficits notnoted  NECK: Supple, symmetrical, trachea midline   Thyroid goiter not appreciated   Carotid bruit notnoted  LUNGS:  No increased work of breathing                 Clear to auscultation  CARDIOVASCULAR:  regular rate and rhythm   ABDOMEN:  soft, non-distended, non-tender   Hernias not noted   Aorta is not palpable  Lymphatics : Cervical lymphadenopathy notnoted     Femoral lymphadenopathy ntonoted  SKIN:   Normal skin color   Texture and turgor normal, no induration  EXTREMITIES:   R UE Cyanosis of fingers worse on right   Brachial 2+, radial 2+, ulnar 1+, palmar biphasic  L UE Cyanosis of fingers    Brachial 2+, radial 2+, ulnar 1+, palmar biphasic  R LE Edema absent   No open wounds  L LE Edema slight   No open wounds  R femoral 2+ L femoral 2+   R posterior tibial 2+ L posterior tibial 1+     RADIOLOGY:    A/P Cyanosis of fingers  Raynauds  We discussed the pathophysiology of raynauds  Generally it a a benign condition but can lead to ischemic changes of digits  Emphasized importance of extremity care  They understood to call if develops any wounds or ulcerations, changes in appearance or symptoms  Patient and family counseled on the following general measures to prevent or diminish severity of attacks  avoidance of cold exposure especially sudden changes  Strategies to keep the whole body warm - dressing warmly, layered lothing  Strategies to keep digits of the hands and feet warm - gloves, heavy socks, multiple socks  keeping feet and hands dry  Avoid tobacco use  Avoid sympathomimetic drugs such as decongestants, amphetamines, diet pills, ephedra  Avoid medications used to treat attention deficit hyperactivity disorder (methylphenidate, and dextroamphetamine) - she is on adderall 10 mg recently cut back  Avoid medications used for migraines - sumatritpan, caffeine plus ergotamine - she recently had injection and hasn't been taking sumatriptan much recently but previously was taking it regularly  If General measures fail to ameliorate symptoms than would be reasonable to proceed with pharmacologic intervention  Nifedipine Would start at 30 mg daily  Would adjust every 4-6 weeks if no significant improvement      Leanord Sacks, MD

## 2022-12-19 ENCOUNTER — OFFICE VISIT (OUTPATIENT)
Dept: VASCULAR SURGERY | Age: 62
End: 2022-12-19
Payer: COMMERCIAL

## 2022-12-19 VITALS — BODY MASS INDEX: 27.99 KG/M2 | HEIGHT: 65 IN | WEIGHT: 168 LBS

## 2022-12-19 DIAGNOSIS — I73.00 RAYNAUD'S DISEASE WITHOUT GANGRENE: Primary | ICD-10-CM

## 2022-12-19 PROCEDURE — 99213 OFFICE O/P EST LOW 20 MIN: CPT

## 2022-12-19 RX ORDER — NIFEDIPINE 30 MG/1
30 TABLET, EXTENDED RELEASE ORAL DAILY
Qty: 30 TABLET | Refills: 5 | Status: SHIPPED | OUTPATIENT
Start: 2022-12-19

## 2022-12-19 NOTE — PROGRESS NOTES
Vascular Surgery Outpatient Follow up    Reason for Consult : Finger cyanosis    PCP : Ayla Thompson MD  Pain Management : Dr Ann Aguilar:    The patient is a 60 y.o. female who states is here in regards to discoloration, pain in her right hand. She has known issues with her right upper extremity since 1990 after multiple injuries. She has been following with Pain management in regards to these issues. She has known complex regional pain syndrome and reflex sympathetic dystropy. Since about 10/2021 she has noticed more issues with cyanosis of her finger tips and sometimes palm of her hand R > L. It has gotten progressively worse. Gets worse if at the computer. If she puts gloves on, or wash hands with warm water it helps. It is worse with cold exposure. The pain associated with this is a 7/10 at its worst, and it lasts about 1-2 hour. It is a burning pain and tingling. She has pain in her left hip and back and radiates down to her left leg laterally to there foot. She is not a smoker. She is not a DM. She does have a hx of L LE DVT when she was pregnant 1985, 1987. Per her report she has had multiple episodes of recurrence and she was on coumadin in tx for this. She last had 1995 after work injury. She has not had any improvement in her symptoms since last office visit. She knows her symptoms are worse in the winter time and is willing to try nifedipine. She was an LPN. She has hashimoto's, Sjogren's.       Past Medical History:        Diagnosis Date    Acid reflux     Arthritis     Asthma     Clotting disorder ([facility] [address] 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, NEAL-1 genotype Heterozygote 5G/4G    CRPS (complex regional pain syndrome type I)     Gastric bypass status for obesity     Heart palpitations     History of constipation     History of phlebitis     Hyperlipidemia     Hypothyroidism     Left lumbar radiculitis 4/26/2022    Left lumbosacral radiculopathy 4/26/2022    Lower leg DVT (deep venous thrombosis) ([facility])     Migraines     Varicose veins      Past Surgical History:        Procedure Laterality Date    ABDOMEN SURGERY      COLONOSCOPY  09/27/2017    ENDOSCOPY, COLON, DIAGNOSTIC      HERNIA REPAIR  07/02/2004    Dr. Verito Burger - Repair of Incisional Hernia with Composix Dual Mesh    Rufus Alvarado, 7/12/16    Dorsal column stimulator [facility], Battery change stimulator 7/12/2016    [place] EGD TRANSORAL BIOPSY SINGLE/MULTIPLE N/A 9/24/2018    EGD ESOPHAGOGASTRODUODENOSCOPY performed by Lajune Duverney, MD at [facility], REPAIR PARAESOPHAGEAL HERNIA, INCL FUNDOPLASTY W/O MESH N/A 11/19/2018    ROBOTIC XI ASSISTED LAPAROSCOPIC HIATAL HERNIA REPAIR WITH MESH performed by Lajune Duverney, MD at [address]  09/30/2003    Dr. Zapata Minium  06/13/2003    Dr. Kit Franoc [facility] - Findings:  Moderate gastritis with linear erosions, Small Hiatal Hernia with columnar epithelium extending up into the distal esophagus     Current Medications:   Current Outpatient Medications   Medication Sig Dispense Refill    LEVOXYL 25 MCG tablet one tablet daily Mon-Sat, take one-half tab Sun 90 tablet 3    Galcanezumab-gnlm (EMGALITY SC) Inject into the skin      esomeprazole (NEXIUM) 20 MG delayed release capsule Take 1 capsule by mouth daily 90 capsule 1    Loratadine (CLARITIN PO) Take by mouth as needed      celecoxib (CELEBREX) 200 MG capsule Take 200 mg by mouth daily      melatonin 3 MG TABS tablet Take 3 mg by mouth nightly as needed      aspirin 81 MG EC tablet Take 81 mg by mouth daily      Zinc Gluconate 15 MG TABS Take one tablet daily (Patient taking differently: 30 mg Take one tablet daily) 90 tablet 3    azelastine (ASTELIN) 0.1 % nasal spray 2 times daily       metroNIDAZOLE (METROGEL) 1 % gel       ondansetron (ZOFRAN) 4 MG tablet TAKE ONE TABLET BY MOUTH EVERY 6 TO 8 HOURS AS NEEDED FOR NAUSEA.      tretinoin microspheres (RETIN-A MICRO) 0.04 % gel       zolpidem (AMBIEN) 5 MG tablet Take 5 mg by mouth nightly. 0    clindamycin-benzoyl peroxide (BENZACLIN) 1-5 % gel       DULoxetine (CYMBALTA) 60 MG extended release capsule Take 60 mg by mouth daily  2    promethazine (PHENERGAN) 25 MG tablet as needed  1    albuterol sulfate  (90 Base) MCG/ACT inhaler Inhale 2 puffs into the lungs every 6 hours as needed for Wheezing      diclofenac sodium 1 % GEL Apply 2 g topically 2 times daily      loteprednol (ALREX) 0.2 % SUSP 1 drop 4 times daily      bisacodyl (DULCOLAX) 5 MG EC tablet Take 5 mg by mouth daily as needed for Constipation      guaiFENesin (MUCINEX) 600 MG extended release tablet Take 600 mg by mouth as needed       Probiotic Product (PROBIOTIC DAILY PO) Take by mouth nightly      Ascorbic Acid (VITAMIN C) 250 MG tablet Take 500 mg by mouth daily       Wheat Dextrin (BENEFIBER) POWD Take by mouth Daily 1 Tablespoons      SUMAtriptan (IMITREX) 100 MG tablet Take 100 mg by mouth once as needed for Migraine      diazepam (VALIUM) 5 MG tablet Take 0.5 tablets by mouth nightly       montelukast (SINGULAIR) 10 MG tablet Take 1 tablet by mouth daily      amphetamine-dextroamphetamine (ADDERALL) 20 MG tablet Take 20 mg by mouth daily . rosuvastatin (CRESTOR) 5 MG tablet Take 5 mg by mouth every other day       diphenhydrAMINE (BENADRYL) 25 MG tablet Take 25 mg by mouth nightly. estradiol (ESTRACE) 0.1 MG/GM vaginal cream Place 2 g vaginally once a week. cycloSPORINE (RESTASIS) 0.05 % ophthalmic emulsion Place 1 drop into both eyes 2 times daily.         amitriptyline (ELAVIL) 10 MG tablet Take 20 mg by mouth nightly      baclofen (LIORESAL) 20 MG tablet Take 20 mg by mouth 2 times daily       Docusate Calcium (STOOL SOFTENER PO) Take 1 capsule by mouth daily       vitamin D (ERGOCALCIFEROL) 13074 UNIT CAPS capsule Take 50,000 Units by mouth once a week       Calcium Citrate-Vitamin D 200-250 MG-UNIT TABS Take 1 tablet by mouth 2 times daily Gummies      therapeutic multivitamin-minerals (THERAGRAN-M) tablet Take 1 tablet by mouth 2 times daily Gummies      Ferrous Fumarate (IRON) 18 MG TBCR Take 2 tablets by mouth daily       Coenzyme Q-10 100 MG CAPS Take 1 capsule by mouth every other day       Magnesium 250 MG TABS Take 500 mg by mouth every evening        No current facility-administered medications for this visit.      Allergies:  Dust mite extract, Other, Doxycycline, Ketorolac tromethamine, Lyrica [pregabalin], Morphine, Pcn [penicillins], and Fentanyl  Social History     Socioeconomic History    Marital status:      Spouse name: Not on file    Number of children: Not on file    Years of education: Not on file    Highest education level: Not on file   Occupational History    Not on file   Tobacco Use    Smoking status: Never    Smokeless tobacco: Never   Vaping Use    Vaping Use: Never used   Substance and Sexual Activity    Alcohol use: Yes     Comment: socially    Drug use: No    Sexual activity: Yes   Other Topics Concern    Not on file   Social History Narrative    Not on file     Social Determinants of Health     Financial Resource Strain: Not on file   Food Insecurity: Not on file   Transportation Needs: Not on file   Physical Activity: Not on file   Stress: Not on file   Social Connections: Not on file   Intimate Partner Violence: Not on file   Housing Stability: Not on file        Family History   Problem Relation Age of Onset    Rheumatologic Disease Mother         Connective tissue disease    Heart Disease Mother     Other Mother 79        Pacemaker, CMP, Pulmonary HTN     Labs  Lab Results   Component Value Date    WBC 4.2 (L) 09/09/2022    HGB 13.6 09/09/2022    HCT 40.9 09/09/2022     09/09/2022 PROTIME 11.9 09/27/2017    INR 1.0 09/27/2017    K 4.3 09/09/2022    BUN 12 09/09/2022    CREATININE 0.8 09/09/2022     PHYSICAL EXAM:    Ht 5' 5\" (1.651 m)   Wt 168 lb (76.2 kg)   BMI 27.96 kg/m²   CONSTITUTIONAL:   Awake, alert, cooperative  PSYCHIATRIC :  Oriented to time, place and person     Appropriate insight to disease process  EYES: Lids and lashes normal  ENT:  External ears and nose without lesions   Hearing deficits notnoted  NECK: Supple, symmetrical, trachea midline   Thyroid goiter not appreciated   Carotid bruit notnoted  LUNGS:  No increased work of breathing                 Clear to auscultation  CARDIOVASCULAR:  regular rate and rhythm   ABDOMEN:  soft, non-distended, non-tender   Hernias not noted   Aorta is not palpable  Lymphatics : Cervical lymphadenopathy notnoted     Femoral lymphadenopathy ntonoted  SKIN:   Normal skin color   Texture and turgor normal, no induration  EXTREMITIES:   R UE Cyanosis of fingers worse on right   Brachial 2+, radial 2+, ulnar 1+, palmar biphasic  L UE Cyanosis of fingers    Brachial 2+, radial 2+, ulnar 1+, palmar biphasic  R LE Edema absent   No open wounds  L LE Edema slight   No open wounds  R femoral 2+ L femoral 2+   R posterior tibial 2+ L posterior tibial 1+     RADIOLOGY:    A/P Cyanosis of fingers  Raynauds  We discussed the pathophysiology of raynauds  Generally it a a benign condition but can lead to ischemic changes of digits  Emphasized importance of extremity care  They understood to call if develops any wounds or ulcerations, changes in appearance or symptoms  Patient and family counseled on the following general measures to prevent or diminish severity of attacks  avoidance of cold exposure especially sudden changes  Strategies to keep the whole body warm - dressing warmly, layered lothing  Strategies to keep digits of the hands and feet warm - gloves, heavy socks, multiple socks  keeping feet and hands dry  Avoid tobacco use  Avoid sympathomimetic drugs such as decongestants, amphetamines, diet pills, ephedra  Avoid medications used to treat attention deficit hyperactivity disorder (methylphenidate, and dextroamphetamine) - she continues on adderall 10 mg   Avoid medications used for migraines - sumatritpan, caffeine plus ergotamine - she is not taking sumatriptan anymore  General measures have failed to ameliorate symptoms   Start Nifedipine 30 mg daily  Would adjust every 4-6 weeks if no significant improvement  F/u in 2 months    Pt seen and plan reviewed with ZEYAD Dotson - CNP

## 2023-01-20 ENCOUNTER — TELEPHONE (OUTPATIENT)
Dept: VASCULAR SURGERY | Age: 63
End: 2023-01-20

## 2023-01-20 NOTE — TELEPHONE ENCOUNTER
Procardia was Rx for Raynaud's however, she took it for 3 days only due to migraines but did seem to help the Raynaud's s/s.

## 2023-02-28 ENCOUNTER — HOSPITAL ENCOUNTER (OUTPATIENT)
Age: 63
Discharge: HOME | End: 2023-02-28
Payer: MEDICARE

## 2023-02-28 DIAGNOSIS — E06.3 HYPOTHYROIDISM DUE TO HASHIMOTO'S THYROIDITIS: ICD-10-CM

## 2023-02-28 DIAGNOSIS — E03.8 HYPOTHYROIDISM DUE TO HASHIMOTO'S THYROIDITIS: ICD-10-CM

## 2023-02-28 LAB — TSH SERPL DL<=0.05 MIU/L-ACNC: 2.51 UIU/ML (ref 0.27–4.2)

## 2023-02-28 PROCEDURE — 36415 COLL VENOUS BLD VENIPUNCTURE: CPT

## 2023-02-28 PROCEDURE — 84443 ASSAY THYROID STIM HORMONE: CPT

## 2023-03-08 ENCOUNTER — OFFICE VISIT (OUTPATIENT)
Dept: BARIATRICS/WEIGHT MGMT | Age: 63
End: 2023-03-08
Payer: MEDICARE

## 2023-03-08 VITALS
HEIGHT: 65 IN | WEIGHT: 171.8 LBS | BODY MASS INDEX: 28.62 KG/M2 | DIASTOLIC BLOOD PRESSURE: 65 MMHG | SYSTOLIC BLOOD PRESSURE: 96 MMHG | TEMPERATURE: 98.2 F | HEART RATE: 96 BPM

## 2023-03-08 DIAGNOSIS — E66.3 OVERWEIGHT: ICD-10-CM

## 2023-03-08 DIAGNOSIS — M85.852 OSTEOPENIA OF NECKS OF BOTH FEMURS: ICD-10-CM

## 2023-03-08 DIAGNOSIS — E03.8 HYPOTHYROIDISM DUE TO HASHIMOTO'S THYROIDITIS: Primary | ICD-10-CM

## 2023-03-08 DIAGNOSIS — E06.3 HYPOTHYROIDISM DUE TO HASHIMOTO'S THYROIDITIS: Primary | ICD-10-CM

## 2023-03-08 DIAGNOSIS — M85.851 OSTEOPENIA OF NECKS OF BOTH FEMURS: ICD-10-CM

## 2023-03-08 PROCEDURE — G8484 FLU IMMUNIZE NO ADMIN: HCPCS | Performed by: INTERNAL MEDICINE

## 2023-03-08 PROCEDURE — 99214 OFFICE O/P EST MOD 30 MIN: CPT | Performed by: INTERNAL MEDICINE

## 2023-03-08 PROCEDURE — G8419 CALC BMI OUT NRM PARAM NOF/U: HCPCS | Performed by: INTERNAL MEDICINE

## 2023-03-08 PROCEDURE — G8428 CUR MEDS NOT DOCUMENT: HCPCS | Performed by: INTERNAL MEDICINE

## 2023-03-08 PROCEDURE — 1036F TOBACCO NON-USER: CPT | Performed by: INTERNAL MEDICINE

## 2023-03-08 PROCEDURE — 99211 OFF/OP EST MAY X REQ PHY/QHP: CPT

## 2023-03-08 PROCEDURE — 3017F COLORECTAL CA SCREEN DOC REV: CPT | Performed by: INTERNAL MEDICINE

## 2023-03-08 RX ORDER — LEVOTHYROXINE SODIUM 25 UG/1
TABLET ORAL
Qty: 78 TABLET | Refills: 3
Start: 2023-03-08 | End: 2023-03-08 | Stop reason: SDUPTHER

## 2023-03-08 RX ORDER — LEVOTHYROXINE SODIUM 25 UG/1
TABLET ORAL
Qty: 78 TABLET | Refills: 3 | Status: SHIPPED | OUTPATIENT
Start: 2023-03-08

## 2023-03-08 NOTE — PATIENT INSTRUCTIONS
Decrease LT4 to 25 mcg, one tablet daily M-Sa, none on [place]  Have repeat TSH performed in six weeks  Hold all B-vitamin containing supplements for one week before the test    Have a DEXA performed at the same facility as the last one    Begin taking Metformin 500 mg according to the following schedule:   Week 1 - Take one tablet every morning with breakfast   Week 2 - Take one tablet every morning with breakfast and one tablet every evening with dinner   Week 3 - Take two tablets every morning with breakfast and one tablet every evening with dinner   Week 4 - Take two tablets every morning with breakfast and two tablets every evening with dinner    See me back in 6 months

## 2023-03-08 NOTE — PROGRESS NOTES
CC -   Hypothyroidism, post- RYGB    Background -   Last visit:   09/08/22  First visit:   11/04/20    Hypothyroidism  Diagnosed 2010  Due to Hashimoto's   Prefers Levoxyl b/c of belief that it is better absorbed in pts with RYGB (told this by her PCP)  No arrhythmias  + Osteopenia  Has been on Fosamax for three years in the past  Takes her LT4 in the evening    9/21/20 TSH 3.060 on LT4 50mcg daily, increased to 75 mcg daily by PCP  11/04/20                                                    dcrs LT4 to 50 mcg daily M-Sa, none Blanchard  12/29/20 TSH 2.100 on LT4 50 mcg daily M-Sa, none Blanchard, on 5/21/21 decr to one tab M-F, no Sa or Blanchard  06/28/21 TSH 3.010 on LT4 50 mcg daily M-F, none Sa or Blanchard  08/04/21 TSH 1.830 on LT4 50 mcg daily M-F, none Sa or Blanchard, Decr 25 mcg, one tablet daily M-F and two tab daily Sa and Blanchard  10/11/21 TSH 2.200 on LT4 25 mcg, one tablet daily M-F and two tab daily Sa and Blanchard  ______________________    [facility] -  Past Medical History:   Diagnosis Date    Acid reflux     Arthritis     Asthma     Clotting disorder ([facility] [address] 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, NEAL-1 genotype Heterozygote 5G/4G    CRPS (complex regional pain syndrome type I)     Gastric bypass status for obesity     Heart palpitations     History of constipation     History of phlebitis     Hyperlipidemia     Hypothyroidism     Left lumbar radiculitis 4/26/2022    Left lumbosacral radiculopathy 4/26/2022    Lower leg DVT (deep venous thrombosis) ([facility])     Migraines     Varicose veins      Current Outpatient Medications   Medication Sig Dispense Refill    NIFEdipine (PROCARDIA XL) 30 MG extended release tablet Take 1 tablet by mouth daily 30 tablet 5    LEVOXYL 25 MCG tablet one tablet daily Mon-Sat, take one-half tab Sun 90 tablet 3    Galcanezumab-gnlm (EMGALITY SC) Inject into the skin      esomeprazole (NEXIUM) 20 MG delayed release capsule Take 1 capsule by mouth daily 90 capsule 1    Loratadine (CLARITIN PO) Take by mouth as needed      celecoxib (CELEBREX) 200 MG capsule Take 200 mg by mouth daily      melatonin 3 MG TABS tablet Take 3 mg by mouth nightly as needed      aspirin 81 MG EC tablet Take 81 mg by mouth daily      Zinc Gluconate 15 MG TABS Take one tablet daily (Patient taking differently: 30 mg Take one tablet daily) 90 tablet 3    azelastine (ASTELIN) 0.1 % nasal spray 2 times daily       metroNIDAZOLE (METROGEL) 1 % gel       ondansetron (ZOFRAN) 4 MG tablet TAKE ONE TABLET BY MOUTH EVERY 6 TO 8 HOURS AS NEEDED FOR NAUSEA.      tretinoin microspheres (RETIN-A MICRO) 0.04 % gel       zolpidem (AMBIEN) 5 MG tablet Take 5 mg by mouth nightly. 0    clindamycin-benzoyl peroxide (BENZACLIN) 1-5 % gel       DULoxetine (CYMBALTA) 60 MG extended release capsule Take 60 mg by mouth daily  2    promethazine (PHENERGAN) 25 MG tablet as needed  1    albuterol sulfate  (90 Base) MCG/ACT inhaler Inhale 2 puffs into the lungs every 6 hours as needed for Wheezing      diclofenac sodium 1 % GEL Apply 2 g topically 2 times daily      loteprednol (ALREX) 0.2 % SUSP 1 drop 4 times daily      bisacodyl (DULCOLAX) 5 MG EC tablet Take 5 mg by mouth daily as needed for Constipation      guaiFENesin (MUCINEX) 600 MG extended release tablet Take 600 mg by mouth as needed       Probiotic Product (PROBIOTIC DAILY PO) Take by mouth nightly      Ascorbic Acid (VITAMIN C) 250 MG tablet Take 500 mg by mouth daily       Wheat Dextrin (BENEFIBER) POWD Take by mouth Daily 1 Tablespoons      SUMAtriptan (IMITREX) 100 MG tablet Take 100 mg by mouth once as needed for Migraine      diazepam (VALIUM) 5 MG tablet Take 0.5 tablets by mouth nightly       montelukast (SINGULAIR) 10 MG tablet Take 1 tablet by mouth daily      amphetamine-dextroamphetamine (ADDERALL) 20 MG tablet Take 20 mg by mouth daily .       rosuvastatin (CRESTOR) 5 MG tablet Take 5 mg by mouth every other day       diphenhydrAMINE (BENADRYL) 25 MG tablet Take 25 mg by mouth nightly. estradiol (ESTRACE) 0.1 MG/GM vaginal cream Place 2 g vaginally once a week. cycloSPORINE (RESTASIS) 0.05 % ophthalmic emulsion Place 1 drop into both eyes 2 times daily. amitriptyline (ELAVIL) 10 MG tablet Take 20 mg by mouth nightly      baclofen (LIORESAL) 20 MG tablet Take 20 mg by mouth 2 times daily       Docusate Calcium (STOOL SOFTENER PO) Take 1 capsule by mouth daily       vitamin D (ERGOCALCIFEROL) 56043 UNIT CAPS capsule Take 50,000 Units by mouth once a week       Calcium Citrate-Vitamin D 200-250 MG-UNIT TABS Take 1 tablet by mouth 2 times daily Gummies      therapeutic multivitamin-minerals (THERAGRAN-M) tablet Take 1 tablet by mouth 2 times daily Gummies      Ferrous Fumarate (IRON) 18 MG TBCR Take 2 tablets by mouth daily       Coenzyme Q-10 100 MG CAPS Take 1 capsule by mouth every other day       Magnesium 250 MG TABS Take 500 mg by mouth every evening        No current facility-administered medications for this visit. PE -  Gen : BP 96/65 (Site: Left Upper Arm, Position: Sitting, Cuff Size: Large Adult)   Pulse 96   Temp 98.2 °F (36.8 °C)   Ht 5' 5\" (1.651 m)   Wt 171 lb 12.8 oz (77.9 kg)   BMI 28.59 kg/m²    WN, WD, NAD  Heart: RRR w/o mgr, no carotid bruits, no LE edema (wearing support stockings)  Lung: Nml resp effort, CTA b/l  Psych: Normal mood   Full affect  Neuro:  Moves all ext well, 2+ bicep reflex, mild fine tremor of outstretched fingers b/l  ______________________      HPI & A/P -   Problem 1  - Hypothyroidism  HPI   - See above Background for description      Target TSH for her age and osteopenia is 4.5-5.0      06/28/21 TSH 3.010 on LT4 50 mcg daily M-F, none Blanchard      08/04/21 TSH 1.830 on LT4 50 mcg daily M-F, none Blanchard, Decr 25 mcg, one tablet daily M-F and two tab daily Sa and Blanchard      10/11/21 TSH 2.200 on LT4 25 mcg, one tablet daily M-F and two tab daily Sa and Blanchard; Decrto 25 mcg, one tablet daily M-Sa and two tab Blanchard      2/08/22 TSH 2.74 on LT4 25 mcg M-Sa, 50 mcg Blanchard            She prefered to not decrease her dose at that time (target range is 3.5-5.0 based on her age) b/c of not wanting to risk an increase in fatigue    8/16/22 TSH 1.94 on LT4 25 mcg M-Sa, 50 mcg Blanchard    10/06/22 TSH 2.18 on LT4 25 mcg, one tab daily; decr to one tab daily M-Sa, one-half tab on Blanchard    02/23/23 TSH 2.50 on LT4 25 mcg, one tab daily M-Sa, one-half tab Blanchard      Symptoms: + chronic constipation, no diarrhea, sensitive to both cold and heat, palpitations episodes remain low in freq,  no tremors,  + sweating,  + fatigue   Assessment  - Controlled but TSH remains below preferred target range of 3.5-5; will decr LT4 (she prefers to do this slowly)  Plan   -   Decrease LT4 to 25 mcg, one tablet daily M-Sa, none on Blanchard  Have repeat TSH performed in six weeks  Hold all B-vitamin containing supplements for one week before the test    Problem 2  - Osteopenia  HPI   - See above Background for description    12/14/18 DEXA  T-scores:  LFN -1.4,  RFN -1.7,  L1-4 +0.1, L1 -0.5    10 yr Fraxx Risk 8.8% Major, 0.9% Hip    Has chose to take D3 50k weekly rather than 5k daily  Assessment  - Controlled  Plan   - Cont with Ca (1500 mg/day) and D3 50,000 IU weekly supplements     Repeat DEXA    Other - overweight; 4 lbs wt gain over past 6 months; would like an appetite suppressant; will trial metformin    Follow up  Return to see me in 6 months    Medication management: LT4 and metformin    Mario Alberto Dallas MD  Endocrinology/Obesity  3/8/23

## 2023-04-17 ENCOUNTER — OFFICE VISIT (OUTPATIENT)
Dept: VASCULAR SURGERY | Age: 63
End: 2023-04-17
Payer: MEDICARE

## 2023-04-17 ENCOUNTER — TELEPHONE (OUTPATIENT)
Dept: VASCULAR SURGERY | Age: 63
End: 2023-04-17

## 2023-04-17 VITALS — BODY MASS INDEX: 27.16 KG/M2 | WEIGHT: 163 LBS | HEIGHT: 65 IN

## 2023-04-17 DIAGNOSIS — I73.00 RAYNAUD'S DISEASE WITHOUT GANGRENE: Primary | ICD-10-CM

## 2023-04-17 PROCEDURE — 3017F COLORECTAL CA SCREEN DOC REV: CPT | Performed by: SURGERY

## 2023-04-17 PROCEDURE — G8419 CALC BMI OUT NRM PARAM NOF/U: HCPCS | Performed by: SURGERY

## 2023-04-17 PROCEDURE — G8427 DOCREV CUR MEDS BY ELIG CLIN: HCPCS | Performed by: SURGERY

## 2023-04-17 PROCEDURE — 99213 OFFICE O/P EST LOW 20 MIN: CPT | Performed by: SURGERY

## 2023-04-17 PROCEDURE — 1036F TOBACCO NON-USER: CPT | Performed by: SURGERY

## 2023-04-17 RX ORDER — NIFEDIPINE 10 MG/1
10 CAPSULE ORAL 3 TIMES DAILY
Qty: 90 CAPSULE | Refills: 3 | Status: SHIPPED | OUTPATIENT
Start: 2023-04-17

## 2023-04-17 NOTE — PROGRESS NOTES
capsule by mouth daily      melatonin 3 MG TABS tablet Take 1 tablet by mouth nightly as needed      aspirin 81 MG EC tablet Take 1 tablet by mouth daily      Zinc Gluconate 15 MG TABS Take one tablet daily (Patient taking differently: 30 mg Take one tablet daily) 90 tablet 3    azelastine (ASTELIN) 0.1 % nasal spray 2 times daily       ondansetron (ZOFRAN) 4 MG tablet TAKE ONE TABLET BY MOUTH EVERY 6 TO 8 HOURS AS NEEDED FOR NAUSEA.      tretinoin microspheres (RETIN-A MICRO) 0.04 % gel       zolpidem (AMBIEN) 5 MG tablet Take 1 tablet by mouth nightly. 0    clindamycin-benzoyl peroxide (BENZACLIN) 1-5 % gel       DULoxetine (CYMBALTA) 60 MG extended release capsule Take 1 capsule by mouth daily  2    promethazine (PHENERGAN) 25 MG tablet as needed  1    albuterol sulfate  (90 Base) MCG/ACT inhaler Inhale 2 puffs into the lungs every 6 hours as needed for Wheezing      diclofenac sodium 1 % GEL Apply 2 g topically 2 times daily      loteprednol (ALREX) 0.2 % SUSP 1 drop 4 times daily      bisacodyl (DULCOLAX) 5 MG EC tablet Take 1 tablet by mouth daily as needed for Constipation      guaiFENesin (MUCINEX) 600 MG extended release tablet Take 1 tablet by mouth as needed      Probiotic Product (PROBIOTIC DAILY PO) Take by mouth nightly      Ascorbic Acid (VITAMIN C) 250 MG tablet Take 2 tablets by mouth daily      Wheat Dextrin (BENEFIBER) POWD Take by mouth Daily 1 Tablespoons      SUMAtriptan (IMITREX) 100 MG tablet Take 1 tablet by mouth once as needed for Migraine      diazepam (VALIUM) 5 MG tablet Take 0.5 tablets by mouth nightly. montelukast (SINGULAIR) 10 MG tablet Take 1 tablet by mouth daily      amphetamine-dextroamphetamine (ADDERALL) 20 MG tablet Take 1 tablet by mouth daily.       rosuvastatin (CRESTOR) 5 MG tablet Take 1 tablet by mouth every other day Indications: Every other day      diphenhydrAMINE (BENADRYL) 25 MG tablet Take 1 tablet by mouth nightly      cycloSPORINE (RESTASIS) 0.05 %

## 2023-05-20 ENCOUNTER — HOSPITAL ENCOUNTER (OUTPATIENT)
Age: 63
Discharge: HOME | End: 2023-05-20
Payer: MEDICARE

## 2023-05-20 LAB — TSH SERPL-MCNC: 1.75 UIU/ML (ref 0.27–4.2)

## 2023-05-20 PROCEDURE — 36415 COLL VENOUS BLD VENIPUNCTURE: CPT

## 2023-05-20 PROCEDURE — 84443 ASSAY THYROID STIM HORMONE: CPT

## 2023-06-19 RX ORDER — NIFEDIPINE 10 MG/1
10 CAPSULE ORAL 3 TIMES DAILY
Qty: 90 CAPSULE | Refills: 3 | Status: SHIPPED | OUTPATIENT
Start: 2023-06-19

## 2023-07-04 ENCOUNTER — TELEPHONE (OUTPATIENT)
Dept: BARIATRICS/WEIGHT MGMT | Age: 63
End: 2023-07-04

## 2023-07-04 NOTE — TELEPHONE ENCOUNTER
Results of 3/10/23 DEXA reviewed  T score R fem neck -1.7  10 risks for fracture:  Hip 1.1%, Major osteoporotic 9.4%  Assessment: Osteopenia  Plan: no treatment needed other than adequate vit D and calcium intake  SDR  07/04/23

## 2023-07-13 ENCOUNTER — HOSPITAL ENCOUNTER (OUTPATIENT)
Age: 63
Discharge: HOME | End: 2023-07-15
Payer: MEDICARE

## 2023-07-13 ENCOUNTER — HOSPITAL ENCOUNTER (OUTPATIENT)
Dept: GENERAL RADIOLOGY | Age: 63
Discharge: HOME | End: 2023-07-15
Attending: FAMILY MEDICINE
Payer: MEDICARE

## 2023-07-13 DIAGNOSIS — M54.50 LOW BACK PAIN, UNSPECIFIED BACK PAIN LATERALITY, UNSPECIFIED CHRONICITY, UNSPECIFIED WHETHER SCIATICA PRESENT: ICD-10-CM

## 2023-07-13 DIAGNOSIS — R05.9 COUGH, UNSPECIFIED TYPE: ICD-10-CM

## 2023-07-13 PROCEDURE — 72110 X-RAY EXAM L-2 SPINE 4/>VWS: CPT

## 2023-07-13 PROCEDURE — 71046 X-RAY EXAM CHEST 2 VIEWS: CPT

## 2023-08-26 DIAGNOSIS — I73.00 RAYNAUD'S DISEASE WITHOUT GANGRENE: Primary | ICD-10-CM

## 2023-08-28 ENCOUNTER — TELEPHONE (OUTPATIENT)
Dept: VASCULAR SURGERY | Age: 63
End: 2023-08-28

## 2023-08-28 RX ORDER — NIFEDIPINE 10 MG/1
10 CAPSULE ORAL 3 TIMES DAILY
Qty: 270 CAPSULE | Refills: 0 | Status: SHIPPED | OUTPATIENT
Start: 2023-08-28 | End: 2023-08-28 | Stop reason: SINTOL

## 2023-08-28 NOTE — TELEPHONE ENCOUNTER
Spoke with patient regarding refill for Nifedipine. She is not taking it due to side effect of migraine headaches and does not want the refill. Will call the office if needs to schedule appointment. Patient to call [facility] to cancel the refill.

## 2023-09-12 ENCOUNTER — HOSPITAL ENCOUNTER (OUTPATIENT)
Age: 63
Discharge: HOME | End: 2023-09-12
Payer: MEDICARE

## 2023-09-12 DIAGNOSIS — E44.1 MILD PROTEIN-CALORIE MALNUTRITION (HCC): ICD-10-CM

## 2023-09-12 DIAGNOSIS — K91.2 MALNUTRITION FOLLOWING GASTROINTESTINAL SURGERY: ICD-10-CM

## 2023-09-12 DIAGNOSIS — E06.3 HYPOTHYROIDISM DUE TO HASHIMOTO'S THYROIDITIS: ICD-10-CM

## 2023-09-12 DIAGNOSIS — E03.8 HYPOTHYROIDISM DUE TO HASHIMOTO'S THYROIDITIS: ICD-10-CM

## 2023-09-12 LAB
25(OH)D3 SERPL-MCNC: 59.5 NG/ML (ref 30–100)
ALBUMIN SERPL-MCNC: 4.2 G/DL (ref 3.5–5.2)
ALP SERPL-CCNC: 95 U/L (ref 35–104)
ALT SERPL-CCNC: 21 U/L (ref 0–32)
ANION GAP SERPL CALCULATED.3IONS-SCNC: 9 MMOL/L (ref 7–16)
AST SERPL-CCNC: 23 U/L (ref 0–31)
BILIRUB SERPL-MCNC: 0.6 MG/DL (ref 0–1.2)
BUN SERPL-MCNC: 13 MG/DL (ref 6–23)
CALCIUM SERPL-MCNC: 9.2 MG/DL (ref 8.6–10.2)
CHLORIDE SERPL-SCNC: 103 MMOL/L (ref 98–107)
CHOLEST SERPL-MCNC: 186 MG/DL
CO2 SERPL-SCNC: 26 MMOL/L (ref 22–29)
CREAT SERPL-MCNC: 0.7 MG/DL (ref 0.5–1)
ERYTHROCYTE [DISTWIDTH] IN BLOOD BY AUTOMATED COUNT: 13.6 % (ref 11.5–15)
FERRITIN SERPL-MCNC: 26 NG/ML
FOLATE SERPL-MCNC: >20 NG/ML (ref 4.8–24.2)
GFR SERPL CREATININE-BSD FRML MDRD: >60 ML/MIN/1.73M2
GLUCOSE SERPL-MCNC: 86 MG/DL (ref 74–99)
HCT VFR BLD AUTO: 40.5 % (ref 34–48)
HGB BLD-MCNC: 13.4 G/DL (ref 11.5–15.5)
MCH RBC QN AUTO: 31.8 PG (ref 26–35)
MCHC RBC AUTO-ENTMCNC: 33.1 G/DL (ref 32–34.5)
MCV RBC AUTO: 96.2 FL (ref 80–99.9)
PLATELET # BLD AUTO: 294 K/UL (ref 130–450)
PMV BLD AUTO: 9.3 FL (ref 7–12)
POTASSIUM SERPL-SCNC: 4.2 MMOL/L (ref 3.5–5)
PREALB SERPL-MCNC: 20 MG/DL (ref 20–40)
PROT SERPL-MCNC: 6.8 G/DL (ref 6.4–8.3)
RBC # BLD AUTO: 4.21 M/UL (ref 3.5–5.5)
SODIUM SERPL-SCNC: 138 MMOL/L (ref 132–146)
TRIGL SERPL-MCNC: 65 MG/DL
TSH SERPL DL<=0.05 MIU/L-ACNC: 2.87 UIU/ML (ref 0.27–4.2)
VIT B12 SERPL-MCNC: 537 PG/ML (ref 211–946)
WBC OTHER # BLD: 4.5 K/UL (ref 4.5–11.5)

## 2023-09-12 PROCEDURE — 85027 COMPLETE CBC AUTOMATED: CPT

## 2023-09-12 PROCEDURE — 82465 ASSAY BLD/SERUM CHOLESTEROL: CPT

## 2023-09-12 PROCEDURE — 82607 VITAMIN B-12: CPT

## 2023-09-12 PROCEDURE — 84443 ASSAY THYROID STIM HORMONE: CPT

## 2023-09-12 PROCEDURE — 82306 VITAMIN D 25 HYDROXY: CPT

## 2023-09-12 PROCEDURE — 36415 COLL VENOUS BLD VENIPUNCTURE: CPT

## 2023-09-12 PROCEDURE — 80053 COMPREHEN METABOLIC PANEL: CPT

## 2023-09-12 PROCEDURE — 84630 ASSAY OF ZINC: CPT

## 2023-09-12 PROCEDURE — 82525 ASSAY OF COPPER: CPT

## 2023-09-12 PROCEDURE — 82728 ASSAY OF FERRITIN: CPT

## 2023-09-12 PROCEDURE — 84255 ASSAY OF SELENIUM: CPT

## 2023-09-12 PROCEDURE — 84478 ASSAY OF TRIGLYCERIDES: CPT

## 2023-09-12 PROCEDURE — 84140 ASSAY OF PREGNENOLONE: CPT

## 2023-09-12 PROCEDURE — 82746 ASSAY OF FOLIC ACID SERUM: CPT

## 2023-09-12 PROCEDURE — 84134 ASSAY OF PREALBUMIN: CPT

## 2023-09-12 PROCEDURE — 84425 ASSAY OF VITAMIN B-1: CPT

## 2023-09-15 LAB
COPPER SERPL-MCNC: 130.8 UG/DL (ref 80–155)
SELENIUM SERPL-MCNC: 114 UG/L (ref 23–190)

## 2023-09-17 LAB — ZINC SERPL-MCNC: 69.3 UG/DL (ref 60–120)

## 2023-09-18 LAB
VIT B1 PYROPHOSHATE BLD-SCNC: 154 NMOL/L (ref 70–180)
ZINC SERPL-MCNC: NORMAL UG/ML

## 2023-09-19 LAB — PREG SERPL-MCNC: 217 NG/DL (ref 15–132)

## 2023-09-28 ENCOUNTER — OFFICE VISIT (OUTPATIENT)
Dept: BARIATRICS/WEIGHT MGMT | Age: 63
End: 2023-09-28
Payer: MEDICARE

## 2023-09-28 VITALS
RESPIRATION RATE: 20 BRPM | HEART RATE: 85 BPM | DIASTOLIC BLOOD PRESSURE: 61 MMHG | TEMPERATURE: 97.3 F | HEIGHT: 65 IN | WEIGHT: 164 LBS | SYSTOLIC BLOOD PRESSURE: 126 MMHG | BODY MASS INDEX: 27.32 KG/M2

## 2023-09-28 VITALS
DIASTOLIC BLOOD PRESSURE: 69 MMHG | HEART RATE: 78 BPM | SYSTOLIC BLOOD PRESSURE: 120 MMHG | HEIGHT: 65 IN | WEIGHT: 165.4 LBS | TEMPERATURE: 97.1 F | BODY MASS INDEX: 27.56 KG/M2

## 2023-09-28 DIAGNOSIS — K91.2 MALNUTRITION FOLLOWING GASTROINTESTINAL SURGERY: Primary | ICD-10-CM

## 2023-09-28 DIAGNOSIS — E03.8 HYPOTHYROIDISM DUE TO HASHIMOTO'S THYROIDITIS: Primary | ICD-10-CM

## 2023-09-28 DIAGNOSIS — E06.3 HYPOTHYROIDISM DUE TO HASHIMOTO'S THYROIDITIS: Primary | ICD-10-CM

## 2023-09-28 PROCEDURE — G8419 CALC BMI OUT NRM PARAM NOF/U: HCPCS | Performed by: NURSE PRACTITIONER

## 2023-09-28 PROCEDURE — 99211 OFF/OP EST MAY X REQ PHY/QHP: CPT

## 2023-09-28 PROCEDURE — G8427 DOCREV CUR MEDS BY ELIG CLIN: HCPCS | Performed by: NURSE PRACTITIONER

## 2023-09-28 PROCEDURE — G8419 CALC BMI OUT NRM PARAM NOF/U: HCPCS | Performed by: INTERNAL MEDICINE

## 2023-09-28 PROCEDURE — 1036F TOBACCO NON-USER: CPT | Performed by: NURSE PRACTITIONER

## 2023-09-28 PROCEDURE — 1036F TOBACCO NON-USER: CPT | Performed by: INTERNAL MEDICINE

## 2023-09-28 PROCEDURE — 3017F COLORECTAL CA SCREEN DOC REV: CPT | Performed by: INTERNAL MEDICINE

## 2023-09-28 PROCEDURE — 3017F COLORECTAL CA SCREEN DOC REV: CPT | Performed by: NURSE PRACTITIONER

## 2023-09-28 PROCEDURE — 99214 OFFICE O/P EST MOD 30 MIN: CPT | Performed by: INTERNAL MEDICINE

## 2023-09-28 PROCEDURE — G8428 CUR MEDS NOT DOCUMENT: HCPCS | Performed by: INTERNAL MEDICINE

## 2023-09-28 PROCEDURE — 99213 OFFICE O/P EST LOW 20 MIN: CPT | Performed by: NURSE PRACTITIONER

## 2023-09-28 RX ORDER — LEVOTHYROXINE SODIUM 25 UG/1
TABLET ORAL
Qty: 65 TABLET | Refills: 3 | Status: SHIPPED | OUTPATIENT
Start: 2023-09-28

## 2023-09-28 NOTE — PROGRESS NOTES
increase in fatigue    8/16/22 TSH 1.94 on LT4 25 mcg M-Sa, 50 mcg Blanchard    10/06/22 TSH 2.18 on LT4 25 mcg, one tab daily; decr to one tab daily M-Sa, one-half tab on Blanchard    02/23/23 TSH 2.50 on LT4 25 mcg, one tab daily M-Sa, one-half tab Blanchard;  decr to 25 mcg, one tab daily, M-Sa, none on Blanchard    09/12/23 TSH 2.87 on LT4 25 mcg, one tab daily, M-Sa, none on Blanchard      Symptoms: + chronic constipation, no diarrhea, sensitive to both cold and heat, palpitations episodes remain low in freq,  no tremors,  + sweating,  + fatigue   Assessment  - Controlled but TSH remains below preferred target range of 3.5-5; will again decr LT4 (she prefers to do this slowly)  Plan   -   Decrease LT4 to 25 mcg, one tablet daily M-F, none on Sa and Blanchard  Have repeat TSH performed in six to eight weeks  Hold all B-vitamin containing supplements for one week before the test    Problem 2  - Osteopenia  HPI   - See above Background for description    12/14/18 DEXA  T-scores:  LFN -1.4,  RFN -1.7,  L1-4 +0.1, L1 -0.5    03/10/23 DEXA  T score RFN  -1.8, L1-4  - 0.6  10 risks for fracture:  Hip 1.1%, Major osteoporotic 9.4%    Has chose to take D3 50k weekly rather than 5k daily  Assessment  - Controlled  Plan   - Cont with Ca (1500 mg/day) and D3 50,000 IU weekly supplements      Other - overweight; 6 lbs wt gain over past 6 months; she stopped metformin b/c of thinking it was the cause of her constipation (however, it cont'd after stopping it) and not feeling that it helped    Follow up  Return to see me in 6 months    Medication management:   LT4    Rosaura Davidson MD  Endocrinology/Obesity  9/28/23

## 2023-09-28 NOTE — PATIENT INSTRUCTIONS
Unsweetened almond milk    Please continue to take your vitamin and mineral supplements as instructed. If you received a blood work prescription today for laboratory monitoring due prior to your next routine follow-up visit, please have this blood work obtained 10 to 14 days prior to your next visit. It is important to fast for 12 hours prior to routine weight loss surgery blood work, EXCEPT for drinking water, to ensure accuracy of results. Please report nausea, vomiting, abdominal pain, or any other problems you experience to your surgeon. For problems related to weight loss surgery, it is best to go to [facility] Emergency Department and have your surgeon paged.

## 2023-09-28 NOTE — PROGRESS NOTES
[Pregabalin] Itching, Swelling and Other (See Comments)     Memory Lapses     Morphine Other (See Comments)     Headache    Nifedipine Headaches    Pcn [Penicillins]     Fentanyl Itching           Past Medical History:   Diagnosis Date    Acid reflux     Arthritis     Asthma     Clotting disorder ([address]) 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, NEAL-1 genotype Heterozygote 5G/4G    CRPS (complex regional pain syndrome type I)     Gastric bypass status for obesity     Heart palpitations     History of constipation     History of phlebitis     Hyperlipidemia     Hypothyroidism     Left lumbar radiculitis 4/26/2022    Left lumbosacral radiculopathy 4/26/2022    Lower leg DVT (deep venous thrombosis) ([address])     Migraines     Varicose veins        Past Surgical History:   Procedure Laterality Date    ABDOMEN SURGERY      COLONOSCOPY  09/27/2017    ENDOSCOPY, COLON, DIAGNOSTIC      HERNIA REPAIR  07/02/2004    Dr. Kourtney Samayoa - Repair of Incisional Hernia with Composix Dual Mesh    [address] [facility] [place], 7/12/16    Dorsal column stimulator [facility], Battery change stimulator 7/12/2016    MI EGD TRANSORAL BIOPSY SINGLE/MULTIPLE N/A 9/24/2018    EGD ESOPHAGOGASTRODUODENOSCOPY performed by Melissa Yip MD at [address] [facility] RPR PARAESPHGL HRNA INCL FUNDPLSTY W/O MESH N/A 11/19/2018    ROBOTIC XI ASSISTED LAPAROSCOPIC HIATAL HERNIA REPAIR WITH MESH performed by Melissa Yip MD at [address] [facility]  09/30/2003    Dr. Estrella Martinez [facility] - Open RYGB     Sweetie Robledo  06/13/2003    Dr. Estrella Martinez [facility] - Findings:  Moderate gastritis with linear erosions, Small Hiatal Hernia with columnar epithelium extending up into the distal esophagus       Current Outpatient Medications   Medication Sig Dispense Refill    LEVOXYL 25 MCG tablet one tablet daily Mon-Sat, take

## 2023-09-28 NOTE — PATIENT INSTRUCTIONS
Decrease LT4 to 25 mcg, one tablet daily M-F, none on Sa and Blanchard  Have repeat TSH performed in six to eight weeks  Hold all B-vitamin containing supplements for one week before the test    See me back in 6 months

## 2023-12-06 DIAGNOSIS — E66.3 OVERWEIGHT: ICD-10-CM

## 2023-12-07 NOTE — TELEPHONE ENCOUNTER
Check with pt about this refill request. I think it came from the pharmacy. At her last appt, I was under the impression she stopped it and did not want to restart it. See if that is the case or if she instead wants it refilled.

## 2024-01-22 ENCOUNTER — TELEPHONE (OUTPATIENT)
Dept: BARIATRICS/WEIGHT MGMT | Age: 64
End: 2024-01-22

## 2024-01-22 DIAGNOSIS — E66.3 OVERWEIGHT: ICD-10-CM

## 2024-04-17 ENCOUNTER — OFFICE VISIT (OUTPATIENT)
Dept: BARIATRICS/WEIGHT MGMT | Age: 64
End: 2024-04-17
Payer: MEDICARE

## 2024-04-17 VITALS
SYSTOLIC BLOOD PRESSURE: 120 MMHG | DIASTOLIC BLOOD PRESSURE: 72 MMHG | WEIGHT: 167 LBS | HEART RATE: 98 BPM | BODY MASS INDEX: 27.82 KG/M2 | HEIGHT: 65 IN

## 2024-04-17 DIAGNOSIS — K21.9 GASTROESOPHAGEAL REFLUX DISEASE WITHOUT ESOPHAGITIS: ICD-10-CM

## 2024-04-17 DIAGNOSIS — K59.00 CONSTIPATION, UNSPECIFIED CONSTIPATION TYPE: ICD-10-CM

## 2024-04-17 DIAGNOSIS — K44.9 HIATAL HERNIA: ICD-10-CM

## 2024-04-17 DIAGNOSIS — K91.2 MALNUTRITION FOLLOWING GASTROINTESTINAL SURGERY: Primary | ICD-10-CM

## 2024-04-17 DIAGNOSIS — R19.8 RECTAL PRESSURE: ICD-10-CM

## 2024-04-17 PROCEDURE — 1036F TOBACCO NON-USER: CPT | Performed by: NURSE PRACTITIONER

## 2024-04-17 PROCEDURE — G8427 DOCREV CUR MEDS BY ELIG CLIN: HCPCS | Performed by: NURSE PRACTITIONER

## 2024-04-17 PROCEDURE — 99213 OFFICE O/P EST LOW 20 MIN: CPT | Performed by: NURSE PRACTITIONER

## 2024-04-17 PROCEDURE — 3017F COLORECTAL CA SCREEN DOC REV: CPT | Performed by: NURSE PRACTITIONER

## 2024-04-17 PROCEDURE — G8419 CALC BMI OUT NRM PARAM NOF/U: HCPCS | Performed by: NURSE PRACTITIONER

## 2024-04-17 RX ORDER — NICOTINE POLACRILEX 4 MG/1
20 GUM, CHEWING ORAL DAILY
COMMUNITY

## 2024-04-17 NOTE — PATIENT INSTRUCTIONS
Please continue to take your vitamin and mineral supplements as instructed.      If you received a blood work prescription today for laboratory monitoring due prior to your next routine follow-up visit, please have this blood work obtained 10 to 14 days prior to your next visit.  It is important to fast for 12 hours prior to routine weight loss surgery blood work, EXCEPT for drinking water, to ensure accuracy of results.    Please report nausea, vomiting, abdominal pain, or any other problems you experience to your surgeon.  For problems related to weight loss surgery, it is best to go to [facility] Emergency Department and have your surgeon paged.    Last Surgical Weight Loss:       No data to display

## 2024-04-17 NOTE — PROGRESS NOTES
Chief Complaint   Patient presents with    Other     Pt states she is having pain after she eats with a lot of heart burn, she is experiencing gas and constipation with no help with medications.        HPI:  Patient presents today for complaints of epigastric pain, as well as heart burn. She tells me that her bra even hurts when that is on. Reports that this does usually occur when she eats a larger meal. She tells me that she has a hiatal hernia as well with repair 11/19/2018. She does take omeprazole daily, carafate prn - she has a hard time swallowing this. She has been doing tums with relief of pain.       She also has an issue with chronic constipation. She has been eating high fiber, doing . Has to take a laxative every 3 days. She tells me that she gets a pressure of rectal pressure after a bowel movement. Also now has external what she thinks are internal hemorrhoids. She denies any rectal bleeding. She is now taking 2 fiber well gummies in the morning, 3 stool softeners daily, uses benefiber in her protein shake in the morning. She will also take senna if she needs to. She does eat greek yogurt a couple of times per day, also takes a probiotic.      She was put on metformin by Dr. Stone in the past. She tells me that this also increases her issues with constipation. She tells me that she has stopped taking this.         Prior to Visit Medications    Medication Sig Taking? Authorizing Provider   omeprazole 20 MG EC tablet Take 1 tablet by mouth daily Yes Bronson Seymour MD   metFORMIN (GLUCOPHAGE) 500 MG tablet Take 2 tablets by mouth 2 times daily (with meals) Yes Des Stone MD   LEVOXYL 25 MCG tablet one tablet daily Mon-Fri, take none on Sa and Sun Yes Des Stone MD   Galcanezumab-gnlm (EMGALITY SC) Inject into the skin Yes ProviderBronson MD   Loratadine (CLARITIN PO) Take by mouth as needed Yes Bronson Seymour MD   celecoxib (CELEBREX) 200 MG capsule Take 1 capsule by

## 2024-05-31 ENCOUNTER — TELEPHONE (OUTPATIENT)
Dept: BARIATRICS/WEIGHT MGMT | Age: 64
End: 2024-05-31

## 2024-05-31 ENCOUNTER — PREP FOR PROCEDURE (OUTPATIENT)
Dept: BARIATRICS/WEIGHT MGMT | Age: 64
End: 2024-05-31

## 2024-05-31 PROBLEM — K59.00 CONSTIPATION: Status: ACTIVE | Noted: 2024-05-31

## 2024-05-31 PROBLEM — K21.9 ESOPHAGEAL REFLUX: Status: ACTIVE | Noted: 2024-05-31

## 2024-05-31 NOTE — TELEPHONE ENCOUNTER
Prior Authorization Form  DEMOGRAPHICS:    Patient Name:  Alyson Reed  Patient :  1960            Insurance:  Payor: [facility] MEDICARE / Plan: [facility] MEDICARE COMPLETE / Product Type: *No Product type* /   Insurance ID Number:    Payer/Plan Subscr  Sex Relation Sub. Ins. ID Effective Group Num   1. GENERIC SELF-* ALYSON REED 1960 Female Self [id number]                                    [id number], [address], [place] [address]   2. [facility] MEDICARE * ALYSON REED 1960 Female Self [id number]                                   [address] [address]         DIAGNOSIS & PROCEDURE:    Procedure/Operation: egd/colonoscopy           CPT Code: 79525/61260    Diagnosis:  gerd/constipation    ICD10 Code: k21.9/k59.00    Location:  [facility]    Surgeon:  Joey    SCHEDULING INFORMATION:    Date: 24    Time:               Anesthesia:  MAC/TIVA                                                       Status:  Outpatient        Special Comments:         Electronically signed by CAESAR FOWLER MA on 2024 at 11:37 AM

## 2024-06-28 ENCOUNTER — HOSPITAL ENCOUNTER (OUTPATIENT)
Age: 64
Discharge: HOME | End: 2024-06-28
Payer: MEDICARE

## 2024-06-28 DIAGNOSIS — E03.8 HYPOTHYROIDISM DUE TO HASHIMOTO'S THYROIDITIS: ICD-10-CM

## 2024-06-28 DIAGNOSIS — K91.2 MALNUTRITION FOLLOWING GASTROINTESTINAL SURGERY: ICD-10-CM

## 2024-06-28 DIAGNOSIS — E06.3 HYPOTHYROIDISM DUE TO HASHIMOTO'S THYROIDITIS: ICD-10-CM

## 2024-06-28 LAB
25(OH)D3 SERPL-MCNC: 65.6 NG/ML (ref 30–100)
ALBUMIN SERPL-MCNC: 3.9 G/DL (ref 3.5–5.2)
ALP SERPL-CCNC: 105 U/L (ref 35–104)
ALT SERPL-CCNC: 22 U/L (ref 0–32)
ANION GAP SERPL CALCULATED.3IONS-SCNC: 6 MMOL/L (ref 7–16)
AST SERPL-CCNC: 22 U/L (ref 0–31)
BILIRUB SERPL-MCNC: 0.4 MG/DL (ref 0–1.2)
BUN SERPL-MCNC: 10 MG/DL (ref 6–23)
CALCIUM SERPL-MCNC: 8.9 MG/DL (ref 8.6–10.2)
CHLORIDE SERPL-SCNC: 104 MMOL/L (ref 98–107)
CO2 SERPL-SCNC: 28 MMOL/L (ref 22–29)
CREAT SERPL-MCNC: 0.8 MG/DL (ref 0.5–1)
ERYTHROCYTE [DISTWIDTH] IN BLOOD BY AUTOMATED COUNT: 14 % (ref 11.5–15)
FERRITIN SERPL-MCNC: 20 NG/ML
FOLATE SERPL-MCNC: >20 NG/ML (ref 4.8–24.2)
GFR, ESTIMATED: 88 ML/MIN/1.73M2
GLUCOSE SERPL-MCNC: 91 MG/DL (ref 74–99)
HCT VFR BLD AUTO: 39.2 % (ref 34–48)
HGB BLD-MCNC: 12.3 G/DL (ref 11.5–15.5)
MCH RBC QN AUTO: 30.1 PG (ref 26–35)
MCHC RBC AUTO-ENTMCNC: 31.4 G/DL (ref 32–34.5)
MCV RBC AUTO: 95.8 FL (ref 80–99.9)
PLATELET # BLD AUTO: 290 K/UL (ref 130–450)
PMV BLD AUTO: 9.3 FL (ref 7–12)
POTASSIUM SERPL-SCNC: 4.2 MMOL/L (ref 3.5–5)
PREALB SERPL-MCNC: 19 MG/DL (ref 20–40)
PROT SERPL-MCNC: 6.5 G/DL (ref 6.4–8.3)
RBC # BLD AUTO: 4.09 M/UL (ref 3.5–5.5)
SODIUM SERPL-SCNC: 138 MMOL/L (ref 132–146)
TSH SERPL DL<=0.05 MIU/L-ACNC: 4.28 UIU/ML (ref 0.27–4.2)
VIT B12 SERPL-MCNC: 567 PG/ML (ref 211–946)
WBC OTHER # BLD: 4.8 K/UL (ref 4.5–11.5)

## 2024-06-28 PROCEDURE — 82306 VITAMIN D 25 HYDROXY: CPT

## 2024-06-28 PROCEDURE — 82607 VITAMIN B-12: CPT

## 2024-06-28 PROCEDURE — 36415 COLL VENOUS BLD VENIPUNCTURE: CPT

## 2024-06-28 PROCEDURE — 82525 ASSAY OF COPPER: CPT

## 2024-06-28 PROCEDURE — 84443 ASSAY THYROID STIM HORMONE: CPT

## 2024-06-28 PROCEDURE — 84590 ASSAY OF VITAMIN A: CPT

## 2024-06-28 PROCEDURE — 84630 ASSAY OF ZINC: CPT

## 2024-06-28 PROCEDURE — 84425 ASSAY OF VITAMIN B-1: CPT

## 2024-06-28 PROCEDURE — 82746 ASSAY OF FOLIC ACID SERUM: CPT

## 2024-06-28 PROCEDURE — 82728 ASSAY OF FERRITIN: CPT

## 2024-06-28 PROCEDURE — 84134 ASSAY OF PREALBUMIN: CPT

## 2024-06-28 PROCEDURE — 85027 COMPLETE CBC AUTOMATED: CPT

## 2024-06-28 PROCEDURE — 80053 COMPREHEN METABOLIC PANEL: CPT

## 2024-06-30 LAB
COPPER SERPL-MCNC: 133.5 UG/DL (ref 80–155)
ZINC SERPL-MCNC: 70.3 UG/DL (ref 60–120)

## 2024-07-01 LAB
RETINYL PALMITATE: <0.02 MG/L (ref 0–0.1)
VITAMIN A LEVEL: 0.54 MG/L (ref 0.3–1.2)
VITAMIN A, INTERP: NORMAL

## 2024-07-01 RX ORDER — VIT C/B6/B5/MAGNESIUM/HERB 173 50-5-6-5MG
500 CAPSULE ORAL DAILY
COMMUNITY

## 2024-07-01 NOTE — PROGRESS NOTES
[facility]                                                                                                                    PRE OP INSTRUCTIONS FOR  Alyson Olivier        Date: 7/1/2024    Date of surgery: 07/02/2024   Arrival Time: Hospital will call you between 5pm and 7pm with your final arrival time for surgery. Go to     front of hospital and check in at information desk.    Nothing by mouth (NPO) as instructed. May have clear liquids up to 2 hours prior to surgery. Nothing solid after midnight. Examples: water, apple juice, black coffee, plain tea    Take the following medications with a small sip of water on the morning of Surgery: none     Diabetics may take half the evening dose of insulin but none after midnight.  If you feel symptomatic or have low blood sugar morning of surgery drink 1-2 ounces of apple juice only. If you take a weekly insulin injection _______________, stop 7 days prior to surgery. If you take _______________, stop 3-4 days prior to surgery.    Aspirin, Ibuprofen, Advil, Naproxen, other Anti-inflammatory products should be stopped before surgery as directed by your surgeon, cardiologist, or primary care Doctor. Herbal supplements and Vitamin E should be stopped five days prior.  May take Tylenol unless instructed otherwise by your surgeon.    Check with your Doctor regarding stopping Plavix, Coumadin, Lovenox, Eliquis, Effient, or other blood thinners such as, pradaxa, lixiana, xaralto and savaysa.    Do not smoke, vape, or use illicit drugs and do not drink any alcoholic beverages 24 hours prior to surgery.    You may brush your teeth the morning of surgery.      You MUST make arrangements for a responsible adult, 18 and over, to take you home after your surgery. You will not be allowed to leave alone or drive yourself home.  You will need someone stay with you the first 24 hrs. Your surgery will be cancelled if you do not have a ride home or someone to  stay with you.    PEDIATRIC PATIENTS ONLY:  A parent/legal guardian must accompany a child scheduled for surgery and plan to stay at the hospital until the child is discharged.  Please do not bring other children with you.    Please wear simple, loose fitting clothing to the hospital.  Do not bring valuables (money, credit cards, checkbooks, etc.) Do not wear any makeup (including no eye makeup) or nail polish on your fingers or toes.    DO NOT wear any jewelry or piercings on day of surgery.  All body piercings and jewelry must be removed.    Shower the night before surgery with an ___ Antibacterial soap /antibacterial (Rodrigo) wipes _______    HYSTERECTOMY patients ONLY - Remember to bring the green Blood Bank bracelet to the hospital on the day of surgery.    If you have a Living Will and/or Durable Power of  for Healthcare, please bring in a copy.    If appropriate bring crutches, inspirex, walker, cane etc...    Notify your Surgeon if you develop any illness between now and surgery time, cough, cold, fever, sore throat, nausea, vomiting, etc.  Please notify your surgeon if you experience dizziness, shortness of breath or blurred vision between now & the time of your surgery.    If you wear dentures, they will need to be removed before going into surgery, we will provide you with a container. If you wear contact lenses or glasses, they will need to be removed, please bring a case for them.    To provide excellent care visitors will be limited to 1 person in the room at any given time.                                                                                         No children under the age of 16 are permitted in the hospital for the safety of all patients.     Any implantable device requiring remote therapy, Please bring remote day of surgery and bring your implant card with you!      21. If you use a C-PAP please bring settings with you, do not bring the machine.    22. Other:

## 2024-07-02 ENCOUNTER — HOSPITAL ENCOUNTER (OUTPATIENT)
Age: 64
Setting detail: OUTPATIENT SURGERY
Discharge: HOME | End: 2024-07-02
Attending: SURGERY | Admitting: SURGERY
Payer: MEDICARE

## 2024-07-02 ENCOUNTER — ANESTHESIA (OUTPATIENT)
Dept: ENDOSCOPY | Age: 64
End: 2024-07-02
Payer: MEDICARE

## 2024-07-02 ENCOUNTER — ANESTHESIA EVENT (OUTPATIENT)
Dept: ENDOSCOPY | Age: 64
End: 2024-07-02
Payer: MEDICARE

## 2024-07-02 VITALS
DIASTOLIC BLOOD PRESSURE: 67 MMHG | WEIGHT: 162 LBS | SYSTOLIC BLOOD PRESSURE: 139 MMHG | HEIGHT: 65 IN | BODY MASS INDEX: 26.99 KG/M2 | RESPIRATION RATE: 16 BRPM | HEART RATE: 65 BPM | TEMPERATURE: 97.7 F | OXYGEN SATURATION: 96 %

## 2024-07-02 DIAGNOSIS — K21.9 ESOPHAGEAL REFLUX: ICD-10-CM

## 2024-07-02 DIAGNOSIS — K59.00 CONSTIPATION: ICD-10-CM

## 2024-07-02 PROCEDURE — 7100000011 HC PHASE II RECOVERY - ADDTL 15 MIN: Performed by: SURGERY

## 2024-07-02 PROCEDURE — 3700000001 HC ADD 15 MINUTES (ANESTHESIA): Performed by: SURGERY

## 2024-07-02 PROCEDURE — 3609027000 HC COLONOSCOPY: Performed by: SURGERY

## 2024-07-02 PROCEDURE — 3700000000 HC ANESTHESIA ATTENDED CARE: Performed by: SURGERY

## 2024-07-02 PROCEDURE — 6360000002 HC RX W HCPCS: Performed by: NURSE ANESTHETIST, CERTIFIED REGISTERED

## 2024-07-02 PROCEDURE — 76937 US GUIDE VASCULAR ACCESS: CPT

## 2024-07-02 PROCEDURE — 3609012400 HC EGD TRANSORAL BIOPSY SINGLE/MULTIPLE: Performed by: SURGERY

## 2024-07-02 PROCEDURE — 2709999900 HC NON-CHARGEABLE SUPPLY: Performed by: SURGERY

## 2024-07-02 PROCEDURE — 87077 CULTURE AEROBIC IDENTIFY: CPT

## 2024-07-02 PROCEDURE — 7100000010 HC PHASE II RECOVERY - FIRST 15 MIN: Performed by: SURGERY

## 2024-07-02 PROCEDURE — 2580000003 HC RX 258: Performed by: SURGERY

## 2024-07-02 RX ORDER — SODIUM CHLORIDE, SODIUM LACTATE, POTASSIUM CHLORIDE, CALCIUM CHLORIDE 600; 310; 30; 20 MG/100ML; MG/100ML; MG/100ML; MG/100ML
INJECTION, SOLUTION INTRAVENOUS CONTINUOUS
Status: DISCONTINUED | OUTPATIENT
Start: 2024-07-02 | End: 2024-07-02 | Stop reason: HOSPADM

## 2024-07-02 RX ORDER — PROPOFOL 10 MG/ML
INJECTION, EMULSION INTRAVENOUS PRN
Status: DISCONTINUED | OUTPATIENT
Start: 2024-07-02 | End: 2024-07-02 | Stop reason: SDUPTHER

## 2024-07-02 RX ADMIN — PROPOFOL 25 MG: 10 INJECTION, EMULSION INTRAVENOUS at 11:06

## 2024-07-02 RX ADMIN — PROPOFOL 50 MG: 10 INJECTION, EMULSION INTRAVENOUS at 11:00

## 2024-07-02 RX ADMIN — PROPOFOL 25 MG: 10 INJECTION, EMULSION INTRAVENOUS at 11:05

## 2024-07-02 RX ADMIN — PROPOFOL 50 MG: 10 INJECTION, EMULSION INTRAVENOUS at 11:04

## 2024-07-02 RX ADMIN — PROPOFOL 100 MG: 10 INJECTION, EMULSION INTRAVENOUS at 10:58

## 2024-07-02 RX ADMIN — PROPOFOL 50 MG: 10 INJECTION, EMULSION INTRAVENOUS at 11:02

## 2024-07-02 RX ADMIN — SODIUM CHLORIDE, POTASSIUM CHLORIDE, SODIUM LACTATE AND CALCIUM CHLORIDE: 600; 310; 30; 20 INJECTION, SOLUTION INTRAVENOUS at 10:54

## 2024-07-02 ASSESSMENT — PAIN - FUNCTIONAL ASSESSMENT: PAIN_FUNCTIONAL_ASSESSMENT: 0-10

## 2024-07-02 ASSESSMENT — PAIN DESCRIPTION - DESCRIPTORS: DESCRIPTORS: PRESSURE;POUNDING

## 2024-07-02 NOTE — DISCHARGE INSTRUCTIONS
operate machinery until the medicine wears off and you can think clearly. Your doctor may tell you not to drive or operate machinery until the day after your test.     Do not sign legal documents or make major decisions until the medicine wears off and you can think clearly. The anesthesia can make it hard for you to fully understand what you are agreeing to.   Follow-up care is a key part of your treatment and safety. Be sure to make and go to all appointments, and call your doctor if you are having problems. It's also a good idea to know your test results and keep a list of the medicines you take.  When should you call for help?   Call 911 anytime you think you may need emergency care. For example, call if:    You passed out (lost consciousness).     You pass maroon or bloody stools.     You have trouble breathing.   Call your doctor now or seek immediate medical care if:    You have pain that does not get better after you take pain medicine.     You are sick to your stomach or cannot drink fluids.     You have new or worse belly pain.     You have blood in your stools.     You have a fever.     You cannot pass stools or gas.   Watch closely for changes in your health, and be sure to contact your doctor if you have any problems.  Where can you learn more?  Go to [email or web address] and enter E264 to learn more about \"Colonoscopy: What to Expect at Home.\"  Current as of: October 25, 2023  Content Version: 14.1  © 2006-2024 [employer].   Care instructions adapted under license by [employer]. If you have questions about a medical condition or this instruction, always ask your healthcare professional. [employer] disclaims any warranty or liability for your use of this information.         Upper GI Endoscopy: What to Expect at Home  Your Recovery  You had an upper GI endoscopy. Your doctor used a thin, lighted tube that bends to look at the inside of your esophagus, your

## 2024-07-02 NOTE — ANESTHESIA POSTPROCEDURE EVALUATION
Department of Anesthesiology  Postprocedure Note    Patient: Alyson Olivier  MRN: [id number]  YOB: 1960  Date of evaluation: 7/2/2024    Procedure Summary       Date: 07/02/24 Room / Location: [place] [address] / [facility]    Anesthesia Start: 1054 Anesthesia Stop: 1116    Procedures:       ESOPHAGOGASTRODUODENOSCOPY BIOPSY      COLONOSCOPY DIAGNOSTIC Diagnosis:       Esophageal reflux      Constipation      (Esophageal reflux [K21.9])      (Constipation [K59.00])    Surgeons: Art Cook MD Responsible Provider: Jesus Klein MD    Anesthesia Type: MAC ASA Status: 3            Anesthesia Type: MAC    Hao Phase I: Hao Score: 10    Hao Phase II: Hao Score: 10    Anesthesia Post Evaluation    Patient location during evaluation: bedside  Patient participation: complete - patient participated  Level of consciousness: awake and alert  Pain score: 2  Airway patency: patent  Nausea & Vomiting: no nausea  Cardiovascular status: blood pressure returned to baseline  Respiratory status: acceptable  Hydration status: euvolemic  Pain management: adequate    No notable events documented.

## 2024-07-02 NOTE — H&P
Alyson Olivier  7/2/2024  [facility]   H&P    Alyson Olivier is a 64 y.o. female who weighs 73.5 kg (162 lb) post open Edwin-en-Y Gastric Bypass 10/2003 at 230 lb. Having severe constipation and hemorrhoids, LLQ pain, bowels only move every 4-5 days.  Swallowing liquids fine, has epigastric pain and some acid reflux post hiatal hernia repair in 2018, not on medications for it. She is meeting fluid recommendations of at least 64 ounces per day and is meeting protein recommendations. She is not exercising.    History:   hiatal hernia repair with mesh 11/19/2018. Had severe diaphragm pain for 1 week post op which resolved with time.  No more constipation due to narcotics for chronic pains. Used a fentanyl patch but had allergic reactions to the patches and constipation worse, then Butrans patch [facility], then Percocet, now off everything.       Weights:  162 lb 7/2/2024   163 lb 11/18/2021   159 lb 10/22/20  154 lb 2019  123 lb 2005  230 lb 2003 bypass    Past medical history:  has a past medical history of Acid reflux, Arthritis, Asthma, Clotting disorder ([facility]), CRPS (complex regional pain syndrome type I), Gastric bypass status for obesity, Heart palpitations, History of constipation, History of phlebitis, Hx of blood clots, Hyperlipidemia, Hypothyroidism, Left lumbar radiculitis, Left lumbosacral radiculopathy, Lower leg DVT (deep venous thrombosis) ([facility]), Migraines, and Varicose veins.    Past surgical history:  has a past surgical history that includes Tonsillectomy and adenoidectomy (1964); laparoscopy (1980 & 1984); Upper gastrointestinal endoscopy (06/13/2003); Edwin-en-Y Gastric Bypass (09/30/2003); hernia repair (07/02/2004); other surgical history (1997, 7/12/16); Colonoscopy (09/27/2017); pr egd transoral biopsy single/multiple (N/A, 9/24/2018); Abdomen surgery; Endoscopy, colon, diagnostic; and pr laps rpr paraesphgl hrna incl fundplsty w/o mesh (N/A,

## 2024-07-02 NOTE — OP NOTE
Alyson Olivier  [facility]    Operative Report    DATE OF PROCEDURE: 7/2/2024    SURGEON: Dr. Art Cook MD      Surgical Assistant: Laura Ramos RN     PREOPERATIVE DIAGNOSES:  Acid reflux, abdominal pain, constipation    POSTOPERATIVE DIAGNOSES:   7/2/2024 upper endoscopy showed no esophagitis, 1/2 cm hiatal hernia, no gastritis, 15 mm anastomosis with no inflammation in the jejunum.  Colonoscopy showed small hemorrhoids, normal mucosa, no colon polyps, no diverticula.    OPERATION:   1) Egd with biopsy   (87035)  2) Colonoscopy to the cecum  ([id number])    ANESTHESIA: LMAC.  BLOOD LOSS: none  SPECIMEN: gastric pouch    History and consent:  This is a 64 y.o. year old female who needs both upper and lower endoscopies.   I have discussed with the patient the indication, alternatives, and the possible risks and/or complications of the procedures and the anesthesia. The patient appears to understand and agrees to proceed. Mag Citrate was given two days ago to start the bowels moving, then a Myralax bowel prep was done yesterday until the bowels finally moved.    Monitoring and Safety:    Anaesthesia monitored vital signs, BP, pulse oximetry, cardiac and level of consciousness until recovered.     EGD:   The patient was placed on the table and sedated by Anaesthesia. Bite block was placed. A lubricated scope was easily passed into the upper esophagus and distal esophagus which was normal. The Z line was measured at 36 cm. The scope was passed into the stomach pouch, the gastric mucosa was normal, anastomosis normal at 15 cm, no jejunal inflammation. The scope was retroflexed, there was a 1/2 cm hiatal hernia.  Gastric biopsy was taken to check for H. pylori.     The patient tolerated the procedure well.    COLONOSCOPY:  The table was turned around, the patient was still sedated by anaesthesia. An anal exam was done, 1 cm old external hemorrhoidal tags were present, no

## 2024-07-02 NOTE — ANESTHESIA PRE PROCEDURE
NPO Status: Time of last liquid consumption: 0600                        Time of last solid consumption: 2200                        Date of last liquid consumption: 07/02/24                        Date of last solid food consumption: 06/30/24    BMI:   Wt Readings from Last 3 Encounters:   07/02/24 73.5 kg (162 lb)   04/17/24 75.8 kg (167 lb)   09/28/23 75 kg (165 lb 6.4 oz)     Body mass index is 26.96 kg/m².    CBC:   Lab Results   Component Value Date/Time    WBC 4.8 06/28/2024 09:40 AM    RBC 4.09 06/28/2024 09:40 AM    HGB 12.3 06/28/2024 09:40 AM    HCT 39.2 06/28/2024 09:40 AM    MCV 95.8 06/28/2024 09:40 AM    RDW 14.0 06/28/2024 09:40 AM     06/28/2024 09:40 AM       CMP:   Lab Results   Component Value Date/Time     06/28/2024 09:40 AM    K 4.2 06/28/2024 09:40 AM     06/28/2024 09:40 AM    CO2 28 06/28/2024 09:40 AM    BUN 10 06/28/2024 09:40 AM    CREATININE 0.8 06/28/2024 09:40 AM    GFRAA >60 09/09/2022 10:51 AM    LABGLOM 88 06/28/2024 09:40 AM    LABGLOM >60 09/12/2023 11:19 AM    GLUCOSE 91 06/28/2024 09:40 AM    GLUCOSE 81 10/03/2011 11:20 AM    CALCIUM 8.9 06/28/2024 09:40 AM    BILITOT 0.4 06/28/2024 09:40 AM    ALKPHOS 105 06/28/2024 09:40 AM    AST 22 06/28/2024 09:40 AM    ALT 22 06/28/2024 09:40 AM       POC Tests: No results for input(s): \"POCGLU\", \"POCNA\", \"POCK\", \"POCCL\", \"POCBUN\", \"POCHEMO\", \"POCHCT\" in the last 72 hours.    Coags:   Lab Results   Component Value Date/Time    PROTIME 11.9 09/27/2017 10:45 AM    INR 1.0 09/27/2017 10:45 AM       HCG (If Applicable): No results found for: \"PREGTESTUR\", \"PREGSERUM\", \"HCG\", \"HCGQUANT\"     ABGs: No results found for: \"PHART\", \"PO2ART\", \"SZW2ISN\", \"VIP4ABW\", \"BEART\", \"O7BFFIKS\"     Type & Screen (If Applicable):  No results found for: \"LABABO\"    Anesthesia Evaluation  Patient summary reviewed  Airway: Mallampati: II  TM distance: >3 FB   Neck ROM: full  Mouth opening: > = 3 FB   Dental: normal exam     Comment:

## 2024-07-03 LAB
HELIOBACTER PYLORI ID: NEGATIVE
SOURCE, 60200063: NORMAL

## 2024-07-04 LAB — VIT B1 PYROPHOSHATE BLD-SCNC: 135 NMOL/L (ref 70–180)

## 2024-07-18 ENCOUNTER — OFFICE VISIT (OUTPATIENT)
Dept: BARIATRICS/WEIGHT MGMT | Age: 64
End: 2024-07-18
Payer: MEDICARE

## 2024-07-18 VITALS
SYSTOLIC BLOOD PRESSURE: 136 MMHG | HEIGHT: 65 IN | DIASTOLIC BLOOD PRESSURE: 80 MMHG | HEART RATE: 98 BPM | WEIGHT: 166 LBS | BODY MASS INDEX: 27.66 KG/M2

## 2024-07-18 DIAGNOSIS — K59.04 CHRONIC IDIOPATHIC CONSTIPATION: Primary | ICD-10-CM

## 2024-07-18 PROCEDURE — 99211 OFF/OP EST MAY X REQ PHY/QHP: CPT

## 2024-07-18 NOTE — PATIENT INSTRUCTIONS
What is the next step to proceed with weight loss surgery?    Please be aware that any co-pays or deductibles may be requested prior to testing and / or procedures.    You will need to schedule a psychological evaluation for weight loss surgery.  Patients will be required to complete all psychological testing as required by the mental health provider. Patients must also follow all of the provider's recommendations before weight loss surgery can be scheduled.     The evaluation must be done a standard way for weight loss surgery. We strongly recommend that you contact one of our preferred providers listed below to arrange this:      Milan Kellogg, [facility]-  [address], [place]   [phone]    Dr. Kourtney Doherty, PhD , [facility]  [facility], [facility] [phone]      Dr. Usman Galaviz, PhD     [address], [place]    [phone]      You will also need to plan on attending a 2 hour nutrition class at [facility] prior to your surgery.  We will schedule this for you when we schedule your surgery.    Please remember to have your labs drawn 10 days prior to your first scheduled dietary appointment.    Please remember, that while we will submit your case to insurance for surgery authorization, it is your responsibility to know if your plan covers weight loss surgery and keep up-to-date with changes to your insurance coverage.  We will do everything possible to help you get approved for weight loss surgery, but cannot guarantee an approval.     Please note that you will not be submitted to your insurance company until all pre-operative testing requirements are met.    Last Surgical Weight Loss:       No data to display

## 2024-07-18 NOTE — PROGRESS NOTES
Citrate-Vitamin D 200-250 MG-UNIT TABS Take 1 tablet by mouth 2 times daily Gummies Yes Bronson Seymour MD   therapeutic multivitamin-minerals (THERAGRAN-M) tablet Take 1 tablet by mouth 2 times daily Gummies Yes Bronson Seymour MD   Ferrous Fumarate (IRON) 18 MG TBCR Take 1 tablet by mouth daily Yes Bronson Seymour MD   Coenzyme Q-10 100 MG CAPS Take 1 capsule by mouth every other day Yes Bronson Seymour MD   Magnesium 250 MG TABS Take 2 tablets by mouth every evening Yes Bronson Seymour MD     Allergies: Dust mite extract, Other, Doxycycline, Ketorolac tromethamine, Lyrica [pregabalin], Morphine, Nifedipine, Pcn [penicillins], and Fentanyl   Social History:   TOBACCO:   reports that she has never smoked. She has never used smokeless tobacco.  All smokers should join the free smoking cessation program and stop smoking before having surgery.  ETOH:    reports current alcohol use.  Family History   Problem Relation Age of Onset    Rheumatologic Disease Mother         Connective tissue disease    Heart Disease Mother     Other Mother 70        Pacemaker, CMP, Pulmonary HTN     Review of Systems:  Psychiatric:  depression and anxiety  Respiratory: negative  Cardiovascular: negative  Gastrointestinal: negative  Musculoskeletal:negative  All others reviewed, negative    Physical Exam:   VITALS: Blood pressure 136/80, pulse 98, height 1.651 m (5' 5\"), weight 75.3 kg (166 lb).  General appearance: alert, appears stated age and cooperative, does ambulate easily  Head: Normocephalic, without obvious abnormality, atraumatic  Eyes: PERRL  Ears/mouth/throat:  Ears clear, mouth normal, throat no redness  Neck: no adenopathy, no JVD, supple, symmetrical, trachea midline and thyroid not enlarged  Lungs: clear to auscultation bilaterally  Heart: regular rate and rhythm  Abdomen: soft, non-tender; bowel sounds normal; no masses,  no organomegaly  Extremities: extremities normal, atraumatic, no cyanosis

## 2024-08-06 ENCOUNTER — OFFICE VISIT (OUTPATIENT)
Dept: BARIATRICS/WEIGHT MGMT | Age: 64
End: 2024-08-06
Payer: MEDICARE

## 2024-08-06 VITALS
BODY MASS INDEX: 28.32 KG/M2 | HEIGHT: 65 IN | HEART RATE: 91 BPM | TEMPERATURE: 98.2 F | SYSTOLIC BLOOD PRESSURE: 143 MMHG | DIASTOLIC BLOOD PRESSURE: 80 MMHG | WEIGHT: 170 LBS | RESPIRATION RATE: 20 BRPM

## 2024-08-06 DIAGNOSIS — E03.8 HYPOTHYROIDISM DUE TO HASHIMOTO'S THYROIDITIS: Primary | ICD-10-CM

## 2024-08-06 DIAGNOSIS — E06.3 HYPOTHYROIDISM DUE TO HASHIMOTO'S THYROIDITIS: Primary | ICD-10-CM

## 2024-08-06 DIAGNOSIS — E66.3 OVERWEIGHT: ICD-10-CM

## 2024-08-06 PROCEDURE — 99211 OFF/OP EST MAY X REQ PHY/QHP: CPT

## 2024-08-06 PROCEDURE — 1036F TOBACCO NON-USER: CPT | Performed by: INTERNAL MEDICINE

## 2024-08-06 PROCEDURE — G8399 PT W/DXA RESULTS DOCUMENT: HCPCS | Performed by: INTERNAL MEDICINE

## 2024-08-06 PROCEDURE — 1123F ACP DISCUSS/DSCN MKR DOCD: CPT | Performed by: INTERNAL MEDICINE

## 2024-08-06 PROCEDURE — 1090F PRES/ABSN URINE INCON ASSESS: CPT | Performed by: INTERNAL MEDICINE

## 2024-08-06 PROCEDURE — 3017F COLORECTAL CA SCREEN DOC REV: CPT | Performed by: INTERNAL MEDICINE

## 2024-08-06 PROCEDURE — G8428 CUR MEDS NOT DOCUMENT: HCPCS | Performed by: INTERNAL MEDICINE

## 2024-08-06 PROCEDURE — G8419 CALC BMI OUT NRM PARAM NOF/U: HCPCS | Performed by: INTERNAL MEDICINE

## 2024-08-06 PROCEDURE — 99214 OFFICE O/P EST MOD 30 MIN: CPT | Performed by: INTERNAL MEDICINE

## 2024-08-06 RX ORDER — LEVOTHYROXINE SODIUM 25 UG/1
TABLET ORAL
Qty: 65 TABLET | Refills: 3 | Status: SHIPPED | OUTPATIENT
Start: 2024-08-06

## 2024-08-06 NOTE — PROGRESS NOTES
(CELEBREX) 200 MG capsule Take 1 capsule by mouth daily      melatonin 3 MG TABS tablet Take 1 tablet by mouth nightly as needed      aspirin 81 MG EC tablet Take 1 tablet by mouth daily      Zinc Gluconate 15 MG TABS Take one tablet daily (Patient taking differently: 30 mg Take one tablet daily) 90 tablet 3    azelastine (ASTELIN) 0.1 % nasal spray 2 times daily       tretinoin microspheres (RETIN-A MICRO) 0.04 % gel       zolpidem (AMBIEN) 5 MG tablet Take 1 tablet by mouth nightly as needed.  0    clindamycin-benzoyl peroxide (BENZACLIN) 1-5 % gel       DULoxetine (CYMBALTA) 60 MG extended release capsule Take 1 capsule by mouth daily  2    promethazine (PHENERGAN) 25 MG tablet as needed  1    albuterol sulfate  (90 Base) MCG/ACT inhaler Inhale 2 puffs into the lungs every 6 hours as needed for Wheezing      diclofenac sodium 1 % GEL Apply 2 g topically 2 times daily      loteprednol (ALREX) 0.2 % SUSP 1 drop 4 times daily as needed      bisacodyl (DULCOLAX) 5 MG EC tablet Take 1 tablet by mouth daily as needed for Constipation      guaiFENesin (MUCINEX) 600 MG extended release tablet Take 1 tablet by mouth as needed      Probiotic Product (PROBIOTIC DAILY PO) Take by mouth nightly      Ascorbic Acid (VITAMIN C) 250 MG tablet Take 2 tablets by mouth daily      Wheat Dextrin (BENEFIBER) POWD Take by mouth Daily 1 Tablespoons      SUMAtriptan (IMITREX) 100 MG tablet Take 1 tablet by mouth once as needed for Migraine      diazepam (VALIUM) 5 MG tablet Take 0.5 tablets by mouth nightly.      montelukast (SINGULAIR) 10 MG tablet Take 1 tablet by mouth daily      amphetamine-dextroamphetamine (ADDERALL) 20 MG tablet Take 1 tablet by mouth daily.      rosuvastatin (CRESTOR) 5 MG tablet Take 1 tablet by mouth every other day Indications: Every other day      diphenhydrAMINE (BENADRYL) 25 MG tablet Take 1 tablet by mouth nightly      cycloSPORINE (RESTASIS) 0.05 % ophthalmic emulsion Place 1 drop into both eyes 2

## 2024-08-06 NOTE — PATIENT INSTRUCTIONS
Cont LT4 25 mcg, one tablet daily M-F, none on Sa and [place]  Have repeat TSH performed in Dec 2024  Hold all B-vitamin containing supplements for one week before the test    Consider counting calories and limiting them to 1600 karol/d    Walk at least 30 min daily    Make sure fiber intake is at least 22 grams/day. Do this in part or whole by taking 12 tablespoons of [employer] Fiber One original, plain cereal (or [place]'s All Bran Buds cereal) or 4 tablespoons of wheat dextrin powder (Benefiber or generic brand). For both of these, start with 1/8th - 1/4th the target amount and every week add another 1/8th - 1/4th until reaching the target).  Also, fiber gummies containing inulin (such as Nature Made, [place], Benefiber) or Fiber Choice Pre-biotic tablets containing inulin are options. 1 cup of beans or peas is an excellent food source of fiber. Low calorie, high fiber bread products containing modified wheat starch can be used. An example is the [employer] Xtreme Wellness High Fiber Carb Lean tortilla (7grams in 30 calories).  All of these fiber supplements are for the health of the colon. Their purpose is not to prevent or treat constipation.    Hold dairy and fiber supplements for 3 days to see if the gas goes away.    Take Colace 100 mg twice daily and Myralax one heaping tablespoon daily as needed for constipation. Avoid stimulant laxative (eg ducolax).     See me back in one year

## 2024-09-26 ENCOUNTER — OFFICE VISIT (OUTPATIENT)
Dept: BARIATRICS/WEIGHT MGMT | Age: 64
End: 2024-09-26
Payer: MEDICARE

## 2024-09-26 VITALS
BODY MASS INDEX: 27.49 KG/M2 | HEART RATE: 87 BPM | SYSTOLIC BLOOD PRESSURE: 124 MMHG | DIASTOLIC BLOOD PRESSURE: 82 MMHG | WEIGHT: 165 LBS | HEIGHT: 65 IN

## 2024-09-26 DIAGNOSIS — K91.2 MALNUTRITION FOLLOWING GASTROINTESTINAL SURGERY: Primary | ICD-10-CM

## 2024-09-26 PROCEDURE — G8399 PT W/DXA RESULTS DOCUMENT: HCPCS | Performed by: NURSE PRACTITIONER

## 2024-09-26 PROCEDURE — 1090F PRES/ABSN URINE INCON ASSESS: CPT | Performed by: NURSE PRACTITIONER

## 2024-09-26 PROCEDURE — 1123F ACP DISCUSS/DSCN MKR DOCD: CPT | Performed by: NURSE PRACTITIONER

## 2024-09-26 PROCEDURE — G8419 CALC BMI OUT NRM PARAM NOF/U: HCPCS | Performed by: NURSE PRACTITIONER

## 2024-09-26 PROCEDURE — 1036F TOBACCO NON-USER: CPT | Performed by: NURSE PRACTITIONER

## 2024-09-26 PROCEDURE — 99213 OFFICE O/P EST LOW 20 MIN: CPT | Performed by: NURSE PRACTITIONER

## 2024-09-26 PROCEDURE — 99211 OFF/OP EST MAY X REQ PHY/QHP: CPT

## 2024-09-26 PROCEDURE — 3017F COLORECTAL CA SCREEN DOC REV: CPT | Performed by: NURSE PRACTITIONER

## 2024-09-26 PROCEDURE — G8427 DOCREV CUR MEDS BY ELIG CLIN: HCPCS | Performed by: NURSE PRACTITIONER

## 2024-10-11 ENCOUNTER — HOSPITAL ENCOUNTER (OUTPATIENT)
Age: 64
Discharge: HOME | End: 2024-10-11
Payer: MEDICARE

## 2024-10-11 LAB
25(OH)D3 SERPL-MCNC: 60 NG/ML (ref 30–100)
ALBUMIN SERPL-MCNC: 4 G/DL (ref 3.5–5.2)
ALP SERPL-CCNC: 108 U/L (ref 35–104)
ALT SERPL-CCNC: 14 U/L (ref 0–32)
ANION GAP SERPL CALCULATED.3IONS-SCNC: 8 MMOL/L (ref 7–16)
AST SERPL-CCNC: 19 U/L (ref 0–31)
BASOPHILS # BLD: 0.05 K/UL (ref 0–0.2)
BASOPHILS NFR BLD: 1 % (ref 0–2)
BILIRUB SERPL-MCNC: 0.3 MG/DL (ref 0–1.2)
BUN SERPL-MCNC: 11 MG/DL (ref 6–23)
CALCIUM SERPL-MCNC: 9 MG/DL (ref 8.6–10.2)
CHLORIDE SERPL-SCNC: 104 MMOL/L (ref 98–107)
CHOLEST SERPL-MCNC: 168 MG/DL
CO2 SERPL-SCNC: 28 MMOL/L (ref 22–29)
CREAT SERPL-MCNC: 0.8 MG/DL (ref 0.5–1)
EOSINOPHIL # BLD: 0.19 K/UL (ref 0.05–0.5)
EOSINOPHILS RELATIVE PERCENT: 5 % (ref 0–6)
ERYTHROCYTE [DISTWIDTH] IN BLOOD BY AUTOMATED COUNT: 14.2 % (ref 11.5–15)
GFR, ESTIMATED: 88 ML/MIN/1.73M2
GLUCOSE SERPL-MCNC: 85 MG/DL (ref 74–99)
HCT VFR BLD AUTO: 38.9 % (ref 34–48)
HDLC SERPL-MCNC: 64 MG/DL
HGB BLD-MCNC: 12.2 G/DL (ref 11.5–15.5)
IMM GRANULOCYTES # BLD AUTO: <0.03 K/UL (ref 0–0.58)
IMM GRANULOCYTES NFR BLD: 0 % (ref 0–5)
INSULIN REFERENCE RANGE:: NORMAL
INSULIN: NORMAL MU/L
LDLC SERPL CALC-MCNC: 90 MG/DL
LYMPHOCYTES NFR BLD: 1.07 K/UL (ref 1.5–4)
LYMPHOCYTES RELATIVE PERCENT: 28 % (ref 20–42)
MCH RBC QN AUTO: 29.8 PG (ref 26–35)
MCHC RBC AUTO-ENTMCNC: 31.4 G/DL (ref 32–34.5)
MCV RBC AUTO: 94.9 FL (ref 80–99.9)
MONOCYTES NFR BLD: 0.41 K/UL (ref 0.1–0.95)
MONOCYTES NFR BLD: 11 % (ref 2–12)
NEUTROPHILS NFR BLD: 54 % (ref 43–80)
NEUTS SEG NFR BLD: 2.05 K/UL (ref 1.8–7.3)
PLATELET # BLD AUTO: 299 K/UL (ref 130–450)
PMV BLD AUTO: 9.5 FL (ref 7–12)
POTASSIUM SERPL-SCNC: 4.3 MMOL/L (ref 3.5–5)
PROT SERPL-MCNC: 6.6 G/DL (ref 6.4–8.3)
RBC # BLD AUTO: 4.1 M/UL (ref 3.5–5.5)
SODIUM SERPL-SCNC: 140 MMOL/L (ref 132–146)
T3FREE SERPL-MCNC: 2.92 PG/ML (ref 2–4.4)
T4 FREE SERPL-MCNC: 0.9 NG/DL (ref 0.9–1.7)
TRIGL SERPL-MCNC: 69 MG/DL
VLDLC SERPL CALC-MCNC: 14 MG/DL
WBC OTHER # BLD: 3.8 K/UL (ref 4.5–11.5)

## 2024-10-11 PROCEDURE — 82306 VITAMIN D 25 HYDROXY: CPT

## 2024-10-11 PROCEDURE — 83525 ASSAY OF INSULIN: CPT

## 2024-10-11 PROCEDURE — 85025 COMPLETE CBC W/AUTO DIFF WBC: CPT

## 2024-10-11 PROCEDURE — 36415 COLL VENOUS BLD VENIPUNCTURE: CPT

## 2024-10-11 PROCEDURE — 84481 FREE ASSAY (FT-3): CPT

## 2024-10-11 PROCEDURE — 80053 COMPREHEN METABOLIC PANEL: CPT

## 2024-10-11 PROCEDURE — 80061 LIPID PANEL: CPT

## 2024-10-11 PROCEDURE — 83721 ASSAY OF BLOOD LIPOPROTEIN: CPT

## 2024-10-11 PROCEDURE — 84439 ASSAY OF FREE THYROXINE: CPT

## 2024-10-15 LAB
INSULIN REFERENCE RANGE:: NORMAL
INSULIN: 3.1 MU/L
LDLC SERPL DIRECT ASSAY-MCNC: 91 MG/DL

## 2024-10-23 LAB — LDLC SERPL DIRECT ASSAY-MCNC: NORMAL MG/DL

## 2025-01-09 ENCOUNTER — HOSPITAL ENCOUNTER (OUTPATIENT)
Dept: CT IMAGING | Age: 65
Discharge: HOME | End: 2025-01-11
Payer: MEDICARE

## 2025-01-09 ENCOUNTER — APPOINTMENT (OUTPATIENT)
Dept: CT IMAGING | Age: 65
DRG: 175 | End: 2025-01-09
Payer: MEDICARE

## 2025-01-09 ENCOUNTER — HOSPITAL ENCOUNTER (INPATIENT)
Age: 65
LOS: 3 days | Discharge: HOME | DRG: 175 | End: 2025-01-13
Attending: STUDENT IN AN ORGANIZED HEALTH CARE EDUCATION/TRAINING PROGRAM | Admitting: INTERNAL MEDICINE
Payer: MEDICARE

## 2025-01-09 ENCOUNTER — APPOINTMENT (OUTPATIENT)
Dept: ULTRASOUND IMAGING | Age: 65
DRG: 175 | End: 2025-01-09
Payer: MEDICARE

## 2025-01-09 DIAGNOSIS — R06.00 DYSPNEA AND RESPIRATORY ABNORMALITY: ICD-10-CM

## 2025-01-09 DIAGNOSIS — R06.89 DYSPNEA AND RESPIRATORY ABNORMALITY: ICD-10-CM

## 2025-01-09 DIAGNOSIS — I26.99 BILATERAL PULMONARY EMBOLISM (HCC): Primary | ICD-10-CM

## 2025-01-09 DIAGNOSIS — R07.9 CHEST PAIN RADIATING TO JAW: ICD-10-CM

## 2025-01-09 DIAGNOSIS — I47.29 NSVT (NONSUSTAINED VENTRICULAR TACHYCARDIA) (HCC): ICD-10-CM

## 2025-01-09 DIAGNOSIS — I82.452 ACUTE DEEP VEIN THROMBOSIS (DVT) OF LEFT PERONEAL VEIN (HCC): ICD-10-CM

## 2025-01-09 DIAGNOSIS — R06.02 SHORTNESS OF BREATH: ICD-10-CM

## 2025-01-09 LAB
ALBUMIN SERPL-MCNC: 3.6 G/DL (ref 3.5–5.2)
ALP SERPL-CCNC: 90 U/L (ref 35–104)
ALT SERPL-CCNC: 33 U/L (ref 0–32)
ANION GAP SERPL CALCULATED.3IONS-SCNC: 10 MMOL/L (ref 7–16)
AST SERPL-CCNC: 18 U/L (ref 0–31)
BASOPHILS # BLD: 0.01 K/UL (ref 0–0.2)
BASOPHILS NFR BLD: 0 % (ref 0–2)
BILIRUB SERPL-MCNC: 0.3 MG/DL (ref 0–1.2)
BNP SERPL-MCNC: 299 PG/ML (ref 0–125)
BUN SERPL-MCNC: 8 MG/DL (ref 6–23)
CALCIUM SERPL-MCNC: 9 MG/DL (ref 8.6–10.2)
CHLORIDE SERPL-SCNC: 103 MMOL/L (ref 98–107)
CO2 SERPL-SCNC: 25 MMOL/L (ref 22–29)
CREAT SERPL-MCNC: 0.7 MG/DL (ref 0.5–1)
EKG ATRIAL RATE: 78 BPM
EKG P AXIS: 30 DEGREES
EKG P-R INTERVAL: 136 MS
EKG Q-T INTERVAL: 368 MS
EKG QRS DURATION: 80 MS
EKG QTC CALCULATION (BAZETT): 419 MS
EKG R AXIS: -16 DEGREES
EKG T AXIS: 43 DEGREES
EKG VENTRICULAR RATE: 78 BPM
EOSINOPHIL # BLD: 0.19 K/UL (ref 0.05–0.5)
EOSINOPHILS RELATIVE PERCENT: 2 % (ref 0–6)
ERYTHROCYTE [DISTWIDTH] IN BLOOD BY AUTOMATED COUNT: 14.4 % (ref 11.5–15)
ERYTHROCYTE [DISTWIDTH] IN BLOOD BY AUTOMATED COUNT: 14.5 % (ref 11.5–15)
GFR, ESTIMATED: >90 ML/MIN/1.73M2
GLUCOSE SERPL-MCNC: 96 MG/DL (ref 74–99)
HCT VFR BLD AUTO: 36.3 % (ref 34–48)
HCT VFR BLD AUTO: 36.9 % (ref 34–48)
HGB BLD-MCNC: 11.6 G/DL (ref 11.5–15.5)
HGB BLD-MCNC: 11.8 G/DL (ref 11.5–15.5)
IMM GRANULOCYTES # BLD AUTO: 0.08 K/UL (ref 0–0.58)
IMM GRANULOCYTES NFR BLD: 1 % (ref 0–5)
INR PPP: 1
LYMPHOCYTES NFR BLD: 1.58 K/UL (ref 1.5–4)
LYMPHOCYTES RELATIVE PERCENT: 17 % (ref 20–42)
MCH RBC QN AUTO: 30.2 PG (ref 26–35)
MCH RBC QN AUTO: 30.5 PG (ref 26–35)
MCHC RBC AUTO-ENTMCNC: 32 G/DL (ref 32–34.5)
MCHC RBC AUTO-ENTMCNC: 32 G/DL (ref 32–34.5)
MCV RBC AUTO: 94.4 FL (ref 80–99.9)
MCV RBC AUTO: 95.5 FL (ref 80–99.9)
MONOCYTES NFR BLD: 0.61 K/UL (ref 0.1–0.95)
MONOCYTES NFR BLD: 7 % (ref 2–12)
NEUTROPHILS NFR BLD: 74 % (ref 43–80)
NEUTS SEG NFR BLD: 6.87 K/UL (ref 1.8–7.3)
PARTIAL THROMBOPLASTIN TIME: 21.6 SEC (ref 24.5–35.1)
PARTIAL THROMBOPLASTIN TIME: 31 SEC (ref 24.5–35.1)
PLATELET # BLD AUTO: 248 K/UL (ref 130–450)
PLATELET # BLD AUTO: 249 K/UL (ref 130–450)
PMV BLD AUTO: 9.3 FL (ref 7–12)
PMV BLD AUTO: 9.5 FL (ref 7–12)
POTASSIUM SERPL-SCNC: 4.5 MMOL/L (ref 3.5–5)
PROT SERPL-MCNC: 6.5 G/DL (ref 6.4–8.3)
PROTHROMBIN TIME: 10.7 SEC (ref 9.3–12.4)
RBC # BLD AUTO: 3.8 M/UL (ref 3.5–5.5)
RBC # BLD AUTO: 3.91 M/UL (ref 3.5–5.5)
SARS-COV-2 RDRP RESP QL NAA+PROBE: ABNORMAL
SARS-COV-2 RDRP RESP QL NAA+PROBE: DETECTED
SODIUM SERPL-SCNC: 138 MMOL/L (ref 132–146)
SPECIMEN DESCRIPTION: ABNORMAL
SPECIMEN DESCRIPTION: ABNORMAL
TROPONIN I SERPL HS-MCNC: 12 NG/L (ref 0–9)
TROPONIN I SERPL HS-MCNC: 14 NG/L (ref 0–9)
WBC OTHER # BLD: 6.6 K/UL (ref 4.5–11.5)
WBC OTHER # BLD: 9.3 K/UL (ref 4.5–11.5)

## 2025-01-09 PROCEDURE — 71275 CT ANGIOGRAPHY CHEST: CPT

## 2025-01-09 PROCEDURE — 93005 ELECTROCARDIOGRAM TRACING: CPT | Performed by: PHYSICIAN ASSISTANT

## 2025-01-09 PROCEDURE — 85610 PROTHROMBIN TIME: CPT

## 2025-01-09 PROCEDURE — 84484 ASSAY OF TROPONIN QUANT: CPT

## 2025-01-09 PROCEDURE — 2500000003 HC RX 250 WO HCPCS: Performed by: RADIOLOGY

## 2025-01-09 PROCEDURE — 99285 EMERGENCY DEPT VISIT HI MDM: CPT

## 2025-01-09 PROCEDURE — 93970 EXTREMITY STUDY: CPT

## 2025-01-09 PROCEDURE — 87635 SARS-COV-2 COVID-19 AMP PRB: CPT

## 2025-01-09 PROCEDURE — 96374 THER/PROPH/DIAG INJ IV PUSH: CPT

## 2025-01-09 PROCEDURE — 85027 COMPLETE CBC AUTOMATED: CPT

## 2025-01-09 PROCEDURE — 93010 ELECTROCARDIOGRAM REPORT: CPT | Performed by: INTERNAL MEDICINE

## 2025-01-09 PROCEDURE — 80053 COMPREHEN METABOLIC PANEL: CPT

## 2025-01-09 PROCEDURE — 2580000003 HC RX 258: Performed by: STUDENT IN AN ORGANIZED HEALTH CARE EDUCATION/TRAINING PROGRAM

## 2025-01-09 PROCEDURE — 83880 ASSAY OF NATRIURETIC PEPTIDE: CPT

## 2025-01-09 PROCEDURE — 6370000000 HC RX 637 (ALT 250 FOR IP): Performed by: STUDENT IN AN ORGANIZED HEALTH CARE EDUCATION/TRAINING PROGRAM

## 2025-01-09 PROCEDURE — 85025 COMPLETE CBC W/AUTO DIFF WBC: CPT

## 2025-01-09 PROCEDURE — 6360000002 HC RX W HCPCS: Performed by: STUDENT IN AN ORGANIZED HEALTH CARE EDUCATION/TRAINING PROGRAM

## 2025-01-09 PROCEDURE — 85730 THROMBOPLASTIN TIME PARTIAL: CPT

## 2025-01-09 PROCEDURE — 6360000004 HC RX CONTRAST MEDICATION: Performed by: RADIOLOGY

## 2025-01-09 PROCEDURE — 70450 CT HEAD/BRAIN W/O DYE: CPT

## 2025-01-09 RX ORDER — HEPARIN SODIUM 1000 [USP'U]/ML
80 INJECTION, SOLUTION INTRAVENOUS; SUBCUTANEOUS ONCE
Status: COMPLETED | OUTPATIENT
Start: 2025-01-09 | End: 2025-01-09

## 2025-01-09 RX ORDER — HEPARIN SODIUM 1000 [USP'U]/ML
80 INJECTION, SOLUTION INTRAVENOUS; SUBCUTANEOUS PRN
Status: DISCONTINUED | OUTPATIENT
Start: 2025-01-09 | End: 2025-01-11

## 2025-01-09 RX ORDER — IOPAMIDOL 755 MG/ML
75 INJECTION, SOLUTION INTRAVASCULAR
Status: COMPLETED | OUTPATIENT
Start: 2025-01-09 | End: 2025-01-09

## 2025-01-09 RX ORDER — SODIUM CHLORIDE 0.9 % (FLUSH) 0.9 %
10 SYRINGE (ML) INJECTION PRN
Status: DISCONTINUED | OUTPATIENT
Start: 2025-01-09 | End: 2025-01-12 | Stop reason: HOSPADM

## 2025-01-09 RX ORDER — HEPARIN SODIUM 1000 [USP'U]/ML
40 INJECTION, SOLUTION INTRAVENOUS; SUBCUTANEOUS PRN
Status: DISCONTINUED | OUTPATIENT
Start: 2025-01-09 | End: 2025-01-11

## 2025-01-09 RX ORDER — HEPARIN SODIUM 10000 [USP'U]/100ML
5-30 INJECTION, SOLUTION INTRAVENOUS CONTINUOUS
Status: DISCONTINUED | OUTPATIENT
Start: 2025-01-09 | End: 2025-01-11

## 2025-01-09 RX ORDER — 0.9 % SODIUM CHLORIDE 0.9 %
1000 INTRAVENOUS SOLUTION INTRAVENOUS ONCE
Status: COMPLETED | OUTPATIENT
Start: 2025-01-09 | End: 2025-01-09

## 2025-01-09 RX ORDER — LIDOCAINE HYDROCHLORIDE 20 MG/ML
10 SOLUTION OROPHARYNGEAL ONCE
Status: COMPLETED | OUTPATIENT
Start: 2025-01-09 | End: 2025-01-09

## 2025-01-09 RX ADMIN — IOPAMIDOL 75 ML: 755 INJECTION, SOLUTION INTRAVENOUS at 11:25

## 2025-01-09 RX ADMIN — HEPARIN SODIUM 18 UNITS/KG/HR: 10000 INJECTION, SOLUTION INTRAVENOUS at 23:17

## 2025-01-09 RX ADMIN — SODIUM CHLORIDE, PRESERVATIVE FREE 10 ML: 5 INJECTION INTRAVENOUS at 11:25

## 2025-01-09 RX ADMIN — HEPARIN SODIUM 5800 UNITS: 1000 INJECTION INTRAVENOUS; SUBCUTANEOUS at 23:13

## 2025-01-09 RX ADMIN — SODIUM CHLORIDE 1000 ML: 9 INJECTION, SOLUTION INTRAVENOUS at 21:46

## 2025-01-09 RX ADMIN — LIDOCAINE HYDROCHLORIDE 10 ML: 20 SOLUTION ORAL; TOPICAL at 21:45

## 2025-01-09 ASSESSMENT — PAIN SCALES - GENERAL: PAINLEVEL_OUTOF10: 8

## 2025-01-09 ASSESSMENT — PAIN - FUNCTIONAL ASSESSMENT: PAIN_FUNCTIONAL_ASSESSMENT: ACTIVITIES ARE NOT PREVENTED

## 2025-01-09 ASSESSMENT — PAIN DESCRIPTION - DESCRIPTORS: DESCRIPTORS: ACHING

## 2025-01-09 ASSESSMENT — PAIN DESCRIPTION - LOCATION: LOCATION: THROAT

## 2025-01-09 NOTE — ED TRIAGE NOTES
Department of Emergency Medicine    FIRST PROVIDER TRIAGE NOTE             Independent MLP           1/9/25  2:02 PM EST    Date of Encounter: 1/9/25   MRN: [id number]    Vitals:    01/09/25 1350 01/09/25 1402   BP:  127/79   Pulse: 95 92   Resp:  16   Temp:  98 °F (36.7 °C)   SpO2: 95% 100%        HPI: Alyson Olivier is a 64 y.o. 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  There is no evidence of right heart strain.    ROS: Negative for vomiting or diarrhea.    Physical Exam:   Gen Appearance/Constitutional: alert  CV: regular rate     Initial Plan of Care: All treatment areas with department are currently occupied.     Plan to order/Initiate the following while awaiting opening in ED: Triage evaluation  EKG.    Provider-Patient relationship only established for Provider In Triage (PIT).  Full assessment, HPI, and examination not performed, therefore, it is not yet possible to state whether or not an emergency medical condition exists.  This provider not responsible to follow or interpret any labs or testing ordered in triage.  Supervisor request for NAVIN to initiate contact and input an assessment note in triage during high volume surges.     Initial Plan of Care: Initiate Treatment-Testing, Proceed toTreatment Area When Bed Available for ED Attending/MLP to Continue Care  Secondary to high volume, low staffing, and/or boarding- patient to await bed availability.    This ends my PIT-Patient relationship.  Care of patient relinquished after triage    Electronically signed by Jo Hull PA-C   DD: 1/9/25

## 2025-01-09 NOTE — ED PROVIDER NOTES
Department of Emergency Medicine   ED  Provider Note  Admit Date/RoomTime: No admission date for patient encounter.  ED Room: Room/bed info not found              DEON Olivier is a 64 y.o. female with a PMHx significant for  GERD, hypothyroidism, Asthma, HLD, DVT (not on anticoagulation)  who presents for evaluation of shortness of breath, fatigue, beginning prior to arrival.  The complaint has been persistent, moderate in severity, and worsened by nothing.   The patient states that she became ill back in the end of December.  She tested positive for COVID on 12/20/2024.  She called her family physician was started on Paxil bid on 12/30.  Notes that she then started having worsening symptoms.  Including headaches and nausea and fatigue.  1 episode of nonbloody emesis.  She did state that she hit her head other day has ecchymosis to the right thigh.  No LOC.  Notes that she had history of DVTs she had multiple blood clotting issues, she is not currently on anticoagulation as they thought they were provoked.     Review of Systems   Constitutional:  Positive for fatigue. Negative for chills and fever.   HENT:  Negative for ear pain, sinus pressure and sore throat.    Eyes:  Negative for pain, discharge and redness.   Respiratory:  Positive for shortness of breath. Negative for cough and wheezing.    Cardiovascular:  Negative for chest pain.   Gastrointestinal:  Negative for abdominal distention, diarrhea, nausea and vomiting.   Genitourinary:  Negative for dysuria and frequency.   Musculoskeletal:  Negative for arthralgias and back pain.   Skin:  Negative for rash and wound.   Neurological:  Negative for weakness and headaches.   Hematological:  Negative for adenopathy.   All other systems reviewed and are negative.       Physical Exam  Vitals and nursing note reviewed.   Constitutional:       General: She is not in acute distress.     Appearance: She is well-developed. She is not ill-appearing.   HENT:

## 2025-01-10 PROBLEM — I26.99 BILATERAL PULMONARY EMBOLISM (HCC): Status: ACTIVE | Noted: 2025-01-10

## 2025-01-10 PROBLEM — I82.402 ACUTE DEEP VEIN THROMBOSIS (DVT) OF LEFT LOWER EXTREMITY (HCC): Status: ACTIVE | Noted: 2025-01-10

## 2025-01-10 LAB
ANION GAP SERPL CALCULATED.3IONS-SCNC: 9 MMOL/L (ref 7–16)
BASOPHILS # BLD: 0.02 K/UL (ref 0–0.2)
BASOPHILS NFR BLD: 0 % (ref 0–2)
BUN SERPL-MCNC: 9 MG/DL (ref 6–23)
CALCIUM SERPL-MCNC: 8.4 MG/DL (ref 8.6–10.2)
CHLORIDE SERPL-SCNC: 107 MMOL/L (ref 98–107)
CO2 SERPL-SCNC: 24 MMOL/L (ref 22–29)
CREAT SERPL-MCNC: 0.6 MG/DL (ref 0.5–1)
EOSINOPHIL # BLD: 0.26 K/UL (ref 0.05–0.5)
EOSINOPHILS RELATIVE PERCENT: 4 % (ref 0–6)
ERYTHROCYTE [DISTWIDTH] IN BLOOD BY AUTOMATED COUNT: 14.4 % (ref 11.5–15)
GFR, ESTIMATED: >90 ML/MIN/1.73M2
GLUCOSE SERPL-MCNC: 65 MG/DL (ref 74–99)
HCT VFR BLD AUTO: 33.8 % (ref 34–48)
HGB BLD-MCNC: 10.9 G/DL (ref 11.5–15.5)
IMM GRANULOCYTES # BLD AUTO: 0.06 K/UL (ref 0–0.58)
IMM GRANULOCYTES NFR BLD: 1 % (ref 0–5)
LYMPHOCYTES NFR BLD: 2.34 K/UL (ref 1.5–4)
LYMPHOCYTES RELATIVE PERCENT: 39 % (ref 20–42)
MAGNESIUM SERPL-MCNC: 1.9 MG/DL (ref 1.6–2.6)
MCH RBC QN AUTO: 30.2 PG (ref 26–35)
MCHC RBC AUTO-ENTMCNC: 32.2 G/DL (ref 32–34.5)
MCV RBC AUTO: 93.6 FL (ref 80–99.9)
MONOCYTES NFR BLD: 0.59 K/UL (ref 0.1–0.95)
MONOCYTES NFR BLD: 10 % (ref 2–12)
NEUTROPHILS NFR BLD: 46 % (ref 43–80)
NEUTS SEG NFR BLD: 2.77 K/UL (ref 1.8–7.3)
PARTIAL THROMBOPLASTIN TIME: 152.4 SEC (ref 24.5–35.1)
PARTIAL THROMBOPLASTIN TIME: 58.9 SEC (ref 24.5–35.1)
PHOSPHATE SERPL-MCNC: 4.2 MG/DL (ref 2.5–4.5)
PLATELET # BLD AUTO: 253 K/UL (ref 130–450)
PMV BLD AUTO: 9.6 FL (ref 7–12)
POTASSIUM SERPL-SCNC: 3.9 MMOL/L (ref 3.5–5)
POTASSIUM SERPL-SCNC: 4 MMOL/L (ref 3.5–5)
RBC # BLD AUTO: 3.61 M/UL (ref 3.5–5.5)
SODIUM SERPL-SCNC: 140 MMOL/L (ref 132–146)
TROPONIN I SERPL HS-MCNC: 11 NG/L (ref 0–9)
WBC OTHER # BLD: 6 K/UL (ref 4.5–11.5)

## 2025-01-10 PROCEDURE — 83735 ASSAY OF MAGNESIUM: CPT

## 2025-01-10 PROCEDURE — 2060000000 HC ICU INTERMEDIATE R&B

## 2025-01-10 PROCEDURE — 6360000002 HC RX W HCPCS: Performed by: NURSE PRACTITIONER

## 2025-01-10 PROCEDURE — 84100 ASSAY OF PHOSPHORUS: CPT

## 2025-01-10 PROCEDURE — 80048 BASIC METABOLIC PNL TOTAL CA: CPT

## 2025-01-10 PROCEDURE — 6370000000 HC RX 637 (ALT 250 FOR IP): Performed by: NURSE PRACTITIONER

## 2025-01-10 PROCEDURE — 2500000003 HC RX 250 WO HCPCS: Performed by: NURSE PRACTITIONER

## 2025-01-10 PROCEDURE — 85025 COMPLETE CBC W/AUTO DIFF WBC: CPT

## 2025-01-10 PROCEDURE — 84484 ASSAY OF TROPONIN QUANT: CPT

## 2025-01-10 PROCEDURE — 85730 THROMBOPLASTIN TIME PARTIAL: CPT

## 2025-01-10 PROCEDURE — 84132 ASSAY OF SERUM POTASSIUM: CPT

## 2025-01-10 RX ORDER — LACTOBACILLUS RHAMNOSUS GG 10B CELL
1 CAPSULE ORAL NIGHTLY
Status: DISCONTINUED | OUTPATIENT
Start: 2025-01-10 | End: 2025-01-13 | Stop reason: HOSPADM

## 2025-01-10 RX ORDER — ONDANSETRON 4 MG/1
4 TABLET, ORALLY DISINTEGRATING ORAL EVERY 8 HOURS PRN
Status: DISCONTINUED | OUTPATIENT
Start: 2025-01-10 | End: 2025-01-13 | Stop reason: HOSPADM

## 2025-01-10 RX ORDER — LANOLIN ALCOHOL/MO/W.PET/CERES
200 CREAM (GRAM) TOPICAL EVERY EVENING
Status: DISCONTINUED | OUTPATIENT
Start: 2025-01-10 | End: 2025-01-13 | Stop reason: HOSPADM

## 2025-01-10 RX ORDER — AMITRIPTYLINE HYDROCHLORIDE 10 MG/1
20 TABLET ORAL NIGHTLY
Status: DISCONTINUED | OUTPATIENT
Start: 2025-01-10 | End: 2025-01-13 | Stop reason: HOSPADM

## 2025-01-10 RX ORDER — UBIDECARENONE 30 MG
100 CAPSULE ORAL EVERY OTHER DAY
Status: DISCONTINUED | OUTPATIENT
Start: 2025-01-10 | End: 2025-01-10 | Stop reason: CLARIF

## 2025-01-10 RX ORDER — ASCORBIC ACID 500 MG
500 TABLET ORAL DAILY
Status: DISCONTINUED | OUTPATIENT
Start: 2025-01-10 | End: 2025-01-13 | Stop reason: HOSPADM

## 2025-01-10 RX ORDER — SODIUM CHLORIDE 9 MG/ML
INJECTION, SOLUTION INTRAVENOUS PRN
Status: DISCONTINUED | OUTPATIENT
Start: 2025-01-10 | End: 2025-01-13 | Stop reason: HOSPADM

## 2025-01-10 RX ORDER — ASPIRIN 81 MG/1
81 TABLET ORAL DAILY
Status: DISCONTINUED | OUTPATIENT
Start: 2025-01-10 | End: 2025-01-13 | Stop reason: HOSPADM

## 2025-01-10 RX ORDER — BACLOFEN 10 MG/1
10 TABLET ORAL 2 TIMES DAILY
Status: DISCONTINUED | OUTPATIENT
Start: 2025-01-10 | End: 2025-01-13 | Stop reason: HOSPADM

## 2025-01-10 RX ORDER — MONTELUKAST SODIUM 10 MG/1
10 TABLET ORAL DAILY
Status: DISCONTINUED | OUTPATIENT
Start: 2025-01-10 | End: 2025-01-13 | Stop reason: HOSPADM

## 2025-01-10 RX ORDER — PANTOPRAZOLE SODIUM 40 MG/1
40 TABLET, DELAYED RELEASE ORAL
Status: DISCONTINUED | OUTPATIENT
Start: 2025-01-10 | End: 2025-01-13 | Stop reason: HOSPADM

## 2025-01-10 RX ORDER — WHEAT DEXTRIN 3 G/3.8 G
4 POWDER (GRAM) ORAL DAILY
Status: DISCONTINUED | OUTPATIENT
Start: 2025-01-10 | End: 2025-01-13 | Stop reason: HOSPADM

## 2025-01-10 RX ORDER — POLYVINYL ALCOHOL 14 MG/ML
1 SOLUTION/ DROPS OPHTHALMIC 2 TIMES DAILY
Status: DISCONTINUED | OUTPATIENT
Start: 2025-01-10 | End: 2025-01-13 | Stop reason: HOSPADM

## 2025-01-10 RX ORDER — DULOXETIN HYDROCHLORIDE 60 MG/1
60 CAPSULE, DELAYED RELEASE ORAL DAILY
Status: DISCONTINUED | OUTPATIENT
Start: 2025-01-10 | End: 2025-01-13 | Stop reason: HOSPADM

## 2025-01-10 RX ORDER — ALBUTEROL SULFATE 90 UG/1
2 INHALANT RESPIRATORY (INHALATION) EVERY 6 HOURS PRN
Status: DISCONTINUED | OUTPATIENT
Start: 2025-01-10 | End: 2025-01-10 | Stop reason: CLARIF

## 2025-01-10 RX ORDER — ZINC SULFATE 50(220)MG
50 CAPSULE ORAL DAILY
Status: DISCONTINUED | OUTPATIENT
Start: 2025-01-10 | End: 2025-01-13 | Stop reason: HOSPADM

## 2025-01-10 RX ORDER — ZINC OXIDE 13 %
1 CREAM (GRAM) TOPICAL NIGHTLY
Status: DISCONTINUED | OUTPATIENT
Start: 2025-01-10 | End: 2025-01-10 | Stop reason: CLARIF

## 2025-01-10 RX ORDER — ERGOCALCIFEROL 1.25 MG/1
50000 CAPSULE, LIQUID FILLED ORAL WEEKLY
Status: DISCONTINUED | OUTPATIENT
Start: 2025-01-13 | End: 2025-01-13 | Stop reason: HOSPADM

## 2025-01-10 RX ORDER — ONDANSETRON 2 MG/ML
4 INJECTION INTRAMUSCULAR; INTRAVENOUS EVERY 6 HOURS PRN
Status: DISCONTINUED | OUTPATIENT
Start: 2025-01-10 | End: 2025-01-13 | Stop reason: HOSPADM

## 2025-01-10 RX ORDER — CALCIUM CARBONATE 500 MG/1
500 TABLET, CHEWABLE ORAL 3 TIMES DAILY PRN
Status: DISCONTINUED | OUTPATIENT
Start: 2025-01-10 | End: 2025-01-13 | Stop reason: HOSPADM

## 2025-01-10 RX ORDER — M-VIT,TX,IRON,MINS/CALC/FOLIC 27MG-0.4MG
1 TABLET ORAL DAILY
Status: DISCONTINUED | OUTPATIENT
Start: 2025-01-10 | End: 2025-01-13 | Stop reason: HOSPADM

## 2025-01-10 RX ORDER — ACETAMINOPHEN 650 MG/1
650 SUPPOSITORY RECTAL EVERY 6 HOURS PRN
Status: DISCONTINUED | OUTPATIENT
Start: 2025-01-10 | End: 2025-01-13 | Stop reason: HOSPADM

## 2025-01-10 RX ORDER — BISACODYL 5 MG/1
5 TABLET, DELAYED RELEASE ORAL DAILY PRN
Status: DISCONTINUED | OUTPATIENT
Start: 2025-01-10 | End: 2025-01-13 | Stop reason: HOSPADM

## 2025-01-10 RX ORDER — ACETAMINOPHEN 325 MG/1
650 TABLET ORAL EVERY 6 HOURS PRN
Status: DISCONTINUED | OUTPATIENT
Start: 2025-01-10 | End: 2025-01-13 | Stop reason: HOSPADM

## 2025-01-10 RX ORDER — ROSUVASTATIN CALCIUM 5 MG/1
5 TABLET, COATED ORAL EVERY OTHER DAY
Status: DISCONTINUED | OUTPATIENT
Start: 2025-01-10 | End: 2025-01-13 | Stop reason: HOSPADM

## 2025-01-10 RX ORDER — ALBUTEROL SULFATE 0.83 MG/ML
2.5 SOLUTION RESPIRATORY (INHALATION) EVERY 6 HOURS PRN
Status: DISCONTINUED | OUTPATIENT
Start: 2025-01-10 | End: 2025-01-13 | Stop reason: HOSPADM

## 2025-01-10 RX ORDER — FERROUS SULFATE 300 MG/5ML
90 LIQUID (ML) ORAL DAILY
Status: DISCONTINUED | OUTPATIENT
Start: 2025-01-10 | End: 2025-01-13 | Stop reason: HOSPADM

## 2025-01-10 RX ORDER — POLYETHYLENE GLYCOL 3350 17 G/17G
17 POWDER, FOR SOLUTION ORAL DAILY PRN
Status: DISCONTINUED | OUTPATIENT
Start: 2025-01-10 | End: 2025-01-13 | Stop reason: HOSPADM

## 2025-01-10 RX ORDER — PSEUDOEPHEDRINE HCL 30 MG
250 TABLET ORAL 2 TIMES DAILY
Status: DISCONTINUED | OUTPATIENT
Start: 2025-01-10 | End: 2025-01-13 | Stop reason: HOSPADM

## 2025-01-10 RX ORDER — LEVOTHYROXINE SODIUM 25 UG/1
25 TABLET ORAL
Status: DISCONTINUED | OUTPATIENT
Start: 2025-01-10 | End: 2025-01-13 | Stop reason: HOSPADM

## 2025-01-10 RX ORDER — SODIUM CHLORIDE 0.9 % (FLUSH) 0.9 %
5-40 SYRINGE (ML) INJECTION PRN
Status: DISCONTINUED | OUTPATIENT
Start: 2025-01-10 | End: 2025-01-13 | Stop reason: HOSPADM

## 2025-01-10 RX ORDER — SODIUM CHLORIDE 0.9 % (FLUSH) 0.9 %
5-40 SYRINGE (ML) INJECTION EVERY 12 HOURS SCHEDULED
Status: DISCONTINUED | OUTPATIENT
Start: 2025-01-10 | End: 2025-01-13 | Stop reason: HOSPADM

## 2025-01-10 RX ADMIN — SODIUM CHLORIDE, PRESERVATIVE FREE 10 ML: 5 INJECTION INTRAVENOUS at 20:45

## 2025-01-10 RX ADMIN — ACETAMINOPHEN 650 MG: 325 TABLET ORAL at 05:42

## 2025-01-10 RX ADMIN — DULOXETINE 60 MG: 60 CAPSULE, DELAYED RELEASE ORAL at 08:31

## 2025-01-10 RX ADMIN — PANTOPRAZOLE SODIUM 40 MG: 40 TABLET, DELAYED RELEASE ORAL at 05:42

## 2025-01-10 RX ADMIN — Medication 1 TABLET: at 08:31

## 2025-01-10 RX ADMIN — Medication 250 MG: at 08:27

## 2025-01-10 RX ADMIN — Medication 250 MG: at 20:35

## 2025-01-10 RX ADMIN — MONTELUKAST 10 MG: 10 TABLET, FILM COATED ORAL at 08:31

## 2025-01-10 RX ADMIN — CHOLECALCIFEROL TAB 10 MCG (400 UNIT) 200 UNITS: 10 TAB at 20:36

## 2025-01-10 RX ADMIN — MAGNESIUM OXIDE 400 MG (241.3 MG MAGNESIUM) TABLET 200 MG: TABLET at 17:57

## 2025-01-10 RX ADMIN — AMITRIPTYLINE HYDROCHLORIDE 20 MG: 10 TABLET, FILM COATED ORAL at 20:35

## 2025-01-10 RX ADMIN — BACLOFEN 10 MG: 10 TABLET ORAL at 20:35

## 2025-01-10 RX ADMIN — BACLOFEN 10 MG: 10 TABLET ORAL at 08:31

## 2025-01-10 RX ADMIN — OXYCODONE HYDROCHLORIDE AND ACETAMINOPHEN 500 MG: 500 TABLET ORAL at 08:32

## 2025-01-10 RX ADMIN — Medication 1 CAPSULE: at 20:36

## 2025-01-10 RX ADMIN — ASPIRIN 81 MG: 81 TABLET, COATED ORAL at 08:31

## 2025-01-10 RX ADMIN — Medication 90 MG: at 08:32

## 2025-01-10 RX ADMIN — ROSUVASTATIN CALCIUM 5 MG: 5 TABLET, FILM COATED ORAL at 20:35

## 2025-01-10 RX ADMIN — POLYVINYL ALCOHOL 1 DROP: 1.4 SOLUTION/ DROPS OPHTHALMIC at 08:32

## 2025-01-10 RX ADMIN — ACETAMINOPHEN 650 MG: 325 TABLET ORAL at 17:58

## 2025-01-10 RX ADMIN — ONDANSETRON 4 MG: 2 INJECTION, SOLUTION INTRAMUSCULAR; INTRAVENOUS at 08:38

## 2025-01-10 RX ADMIN — LEVOTHYROXINE SODIUM 25 MCG: 25 TABLET ORAL at 05:42

## 2025-01-10 RX ADMIN — CHOLECALCIFEROL TAB 10 MCG (400 UNIT) 200 UNITS: 10 TAB at 08:27

## 2025-01-10 RX ADMIN — SODIUM CHLORIDE, PRESERVATIVE FREE 10 ML: 5 INJECTION INTRAVENOUS at 08:32

## 2025-01-10 RX ADMIN — ZINC SULFATE 220 MG (50 MG) CAPSULE 50 MG: CAPSULE at 08:31

## 2025-01-10 ASSESSMENT — PAIN SCALES - GENERAL
PAINLEVEL_OUTOF10: 6
PAINLEVEL_OUTOF10: 4
PAINLEVEL_OUTOF10: 7
PAINLEVEL_OUTOF10: 6

## 2025-01-10 ASSESSMENT — PAIN - FUNCTIONAL ASSESSMENT: PAIN_FUNCTIONAL_ASSESSMENT: PREVENTS OR INTERFERES SOME ACTIVE ACTIVITIES AND ADLS

## 2025-01-10 ASSESSMENT — PAIN DESCRIPTION - LOCATION
LOCATION: BACK
LOCATION: LEG
LOCATION: CHEST;BACK;THROAT
LOCATION: BACK

## 2025-01-10 ASSESSMENT — PAIN DESCRIPTION - DESCRIPTORS
DESCRIPTORS: PRESSURE;BURNING
DESCRIPTORS: ACHING;DISCOMFORT
DESCRIPTORS: DULL
DESCRIPTORS: DISCOMFORT

## 2025-01-10 ASSESSMENT — PAIN DESCRIPTION - ORIENTATION
ORIENTATION: UPPER
ORIENTATION: RIGHT;MID
ORIENTATION: MID
ORIENTATION: LEFT;LOWER

## 2025-01-10 NOTE — ACP (ADVANCE CARE PLANNING)
Advance Care Planning   Healthcare Decision Maker:    Primary Decision Maker: Olivier,Gordo [facility] - [phone]    Click here to complete Healthcare Decision Makers including selection of the Healthcare Decision Maker Relationship (ie \"Primary\").

## 2025-01-10 NOTE — ED NOTES
.ED to Inpatient Handoff Report    Notified floor that electronic handoff available and patient ready for transport to room 422.    Safety Risks: Risk of falls    Patient in Restraints: no    Constant Observer or Patient : no    Telemetry Monitoring Ordered: Yes          Order to transfer to unit without monitor: NA    Last MEWS: 1 Time completed: 0209    Deterioration Index: 19.57    Vitals:    01/09/25 1402 01/09/25 2051 01/09/25 2117 01/10/25 0209   BP: 127/79 (!) 142/65  130/65   Pulse: 92 63  73   Resp: 16 16  18   Temp: 98 °F (36.7 °C)   98.5 °F (36.9 °C)   TempSrc:    Oral   SpO2: 100% 99%  99%   Weight:   72.1 kg (159 lb)        Opportunity for questions and clarification was provided.

## 2025-01-10 NOTE — H&P
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, NEAL-1 genotype Heterozygote 5G/4G    CRPS (complex regional pain syndrome type I)     Gastric bypass status for obesity     Heart palpitations     History of constipation     History of phlebitis     Hx of blood clots     Hyperlipidemia     Hypothyroidism     Left lumbar radiculitis 04/26/2022    Left lumbosacral radiculopathy 04/26/2022    Lower leg DVT (deep venous thrombosis) ([facility])     Migraines     Varicose veins          Past Surgical History:   Procedure Laterality Date    ABDOMEN SURGERY      COLONOSCOPY  09/27/2017    COLONOSCOPY N/A 7/2/2024    COLONOSCOPY DIAGNOSTIC performed by Art Cook MD at [facility] ENDOSCOPY    ENDOSCOPY, COLON, DIAGNOSTIC      HERNIA REPAIR  07/02/2004    Dr. Cook [facility] - Repair of Incisional Hernia with Com[employer]ix Dual Mesh    LAPAROSCOPY  1980 & 1984    Endometrosis    OTHER SURGICAL HISTORY  1997, 7/12/16    Dorsal column stimulator [facility], Battery change stimulator 7/12/2016    IA EGD TRANSORAL BIOPSY SINGLE/MULTIPLE N/A 9/24/2018    EGD ESOPHAGOGASTRODUODENOSCOPY performed by Art Cook MD at [facility] ENDOSCOPY    IA LAPS RPR PARAESPHGL HRNA INCL FUNDPLSTY W/O MESH N/A 11/19/2018    ROBOTIC XI ASSISTED LAPAROSCOPIC HIATAL HERNIA REPAIR WITH MESH performed by Art Cook MD at [facility] OR    CONCETTA-EN-Y GASTRIC BYPASS  09/30/2003    Dr. Cook [facility] - Open RYGB     TONSILLECTOMY AND ADENOIDECTOMY  1964    UPPER GASTROINTESTINAL ENDOSCOPY  06/13/2003    Dr. Cook [facility] - Findings: Moderate gastritis with linear erosions, Small Hiatal Hernia with columnar epithelium extending up into the distal esophagus    UPPER GASTROINTESTINAL ENDOSCOPY N/A 7/2/2024    ESOPHAGOGASTRODUODENOSCOPY BIOPSY performed by Art Cook MD at [facility] ENDOSCOPY       Medications Prior to Admission:    Medications Prior to Admission: LEVOXYL 25 MCG tablet, one tablet daily Mon-Fri, take none on Sa and Sun  turmeric (QC

## 2025-01-10 NOTE — CARE COORDINATION
Introduced my self and provided explanation of CM role to patient.  Patient is awake, alert, and aware of current diagnosis and treatment plan including isolation precautions, IV heparin infusion, serial labs, pulm and hem/onc consults.  She voices she resides at home with her spouse and completes her adl's with independence.  Patient is established with a pcp and denies any issue with retail pharmaceutical coverage.  Patient verbalizes no concerns and identifies no areas to focus on nor barriers to discharge.   She is on room air.  Eliquis for PE/DVT booklet including 30 day trial voucher left for nursing to include with discharge instructions.  Patient will need followed and assisted with discharge planning as necessary.   Tamir Roth, MSN RN  [facility] Case Management  [phone]

## 2025-01-11 LAB
ANION GAP SERPL CALCULATED.3IONS-SCNC: 6 MMOL/L (ref 7–16)
BASOPHILS # BLD: 0.02 K/UL (ref 0–0.2)
BASOPHILS NFR BLD: 0 % (ref 0–2)
BUN SERPL-MCNC: 8 MG/DL (ref 6–23)
CALCIUM SERPL-MCNC: 9.3 MG/DL (ref 8.6–10.2)
CHLORIDE SERPL-SCNC: 104 MMOL/L (ref 98–107)
CO2 SERPL-SCNC: 28 MMOL/L (ref 22–29)
CREAT SERPL-MCNC: 0.7 MG/DL (ref 0.5–1)
EOSINOPHIL # BLD: 0.19 K/UL (ref 0.05–0.5)
EOSINOPHILS RELATIVE PERCENT: 2 % (ref 0–6)
ERYTHROCYTE [DISTWIDTH] IN BLOOD BY AUTOMATED COUNT: 14.5 % (ref 11.5–15)
GFR, ESTIMATED: >90 ML/MIN/1.73M2
GLUCOSE SERPL-MCNC: 75 MG/DL (ref 74–99)
HCT VFR BLD AUTO: 39.1 % (ref 34–48)
HGB BLD-MCNC: 12.3 G/DL (ref 11.5–15.5)
IMM GRANULOCYTES # BLD AUTO: 0.04 K/UL (ref 0–0.58)
IMM GRANULOCYTES NFR BLD: 1 % (ref 0–5)
LYMPHOCYTES NFR BLD: 1.76 K/UL (ref 1.5–4)
LYMPHOCYTES RELATIVE PERCENT: 21 % (ref 20–42)
MAGNESIUM SERPL-MCNC: 2.1 MG/DL (ref 1.6–2.6)
MCH RBC QN AUTO: 30.1 PG (ref 26–35)
MCHC RBC AUTO-ENTMCNC: 31.5 G/DL (ref 32–34.5)
MCV RBC AUTO: 95.8 FL (ref 80–99.9)
MONOCYTES NFR BLD: 0.71 K/UL (ref 0.1–0.95)
MONOCYTES NFR BLD: 8 % (ref 2–12)
NEUTROPHILS NFR BLD: 68 % (ref 43–80)
NEUTS SEG NFR BLD: 5.88 K/UL (ref 1.8–7.3)
PARTIAL THROMBOPLASTIN TIME: 77.6 SEC (ref 24.5–35.1)
PARTIAL THROMBOPLASTIN TIME: 80.7 SEC (ref 24.5–35.1)
PLATELET # BLD AUTO: 294 K/UL (ref 130–450)
PMV BLD AUTO: 9.5 FL (ref 7–12)
POTASSIUM SERPL-SCNC: 4.2 MMOL/L (ref 3.5–5)
RBC # BLD AUTO: 4.08 M/UL (ref 3.5–5.5)
SODIUM SERPL-SCNC: 138 MMOL/L (ref 132–146)
T4 FREE SERPL-MCNC: 1.1 NG/DL (ref 0.9–1.7)
TSH SERPL DL<=0.05 MIU/L-ACNC: 3.16 UIU/ML (ref 0.27–4.2)
WBC OTHER # BLD: 8.6 K/UL (ref 4.5–11.5)

## 2025-01-11 PROCEDURE — 83735 ASSAY OF MAGNESIUM: CPT

## 2025-01-11 PROCEDURE — 6370000000 HC RX 637 (ALT 250 FOR IP): Performed by: NURSE PRACTITIONER

## 2025-01-11 PROCEDURE — 84439 ASSAY OF FREE THYROXINE: CPT

## 2025-01-11 PROCEDURE — 80048 BASIC METABOLIC PNL TOTAL CA: CPT

## 2025-01-11 PROCEDURE — 85025 COMPLETE CBC W/AUTO DIFF WBC: CPT

## 2025-01-11 PROCEDURE — 2060000000 HC ICU INTERMEDIATE R&B

## 2025-01-11 PROCEDURE — 2500000003 HC RX 250 WO HCPCS: Performed by: NURSE PRACTITIONER

## 2025-01-11 PROCEDURE — 6360000002 HC RX W HCPCS: Performed by: STUDENT IN AN ORGANIZED HEALTH CARE EDUCATION/TRAINING PROGRAM

## 2025-01-11 PROCEDURE — 6370000000 HC RX 637 (ALT 250 FOR IP): Performed by: INTERNAL MEDICINE

## 2025-01-11 PROCEDURE — 99222 1ST HOSP IP/OBS MODERATE 55: CPT | Performed by: INTERNAL MEDICINE

## 2025-01-11 PROCEDURE — 85730 THROMBOPLASTIN TIME PARTIAL: CPT

## 2025-01-11 PROCEDURE — 84443 ASSAY THYROID STIM HORMONE: CPT

## 2025-01-11 PROCEDURE — APPSS180 APP SPLIT SHARED TIME > 60 MINUTES: Performed by: NURSE PRACTITIONER

## 2025-01-11 RX ORDER — TRAMADOL HYDROCHLORIDE 50 MG/1
50 TABLET ORAL EVERY 6 HOURS PRN
Status: DISCONTINUED | OUTPATIENT
Start: 2025-01-11 | End: 2025-01-13 | Stop reason: HOSPADM

## 2025-01-11 RX ORDER — METOPROLOL SUCCINATE 25 MG/1
25 TABLET, EXTENDED RELEASE ORAL DAILY
Status: DISCONTINUED | OUTPATIENT
Start: 2025-01-11 | End: 2025-01-13 | Stop reason: HOSPADM

## 2025-01-11 RX ADMIN — DULOXETINE 60 MG: 60 CAPSULE, DELAYED RELEASE ORAL at 10:13

## 2025-01-11 RX ADMIN — Medication 1 TABLET: at 10:13

## 2025-01-11 RX ADMIN — AMITRIPTYLINE HYDROCHLORIDE 20 MG: 10 TABLET, FILM COATED ORAL at 22:08

## 2025-01-11 RX ADMIN — DIPHENHYDRAMINE HYDROCHLORIDE 5 ML: 12.5 LIQUID ORAL at 16:00

## 2025-01-11 RX ADMIN — APIXABAN 10 MG: 5 TABLET, FILM COATED ORAL at 15:58

## 2025-01-11 RX ADMIN — SODIUM CHLORIDE, PRESERVATIVE FREE 10 ML: 5 INJECTION INTRAVENOUS at 22:14

## 2025-01-11 RX ADMIN — HEPARIN SODIUM 15 UNITS/KG/HR: 10000 INJECTION, SOLUTION INTRAVENOUS at 00:35

## 2025-01-11 RX ADMIN — TRAMADOL HYDROCHLORIDE 50 MG: 50 TABLET, COATED ORAL at 16:28

## 2025-01-11 RX ADMIN — BACLOFEN 10 MG: 10 TABLET ORAL at 10:14

## 2025-01-11 RX ADMIN — POLYVINYL ALCOHOL 1 DROP: 1.4 SOLUTION/ DROPS OPHTHALMIC at 10:18

## 2025-01-11 RX ADMIN — POLYETHYLENE GLYCOL 3350 17 G: 17 POWDER, FOR SOLUTION ORAL at 23:25

## 2025-01-11 RX ADMIN — PANTOPRAZOLE SODIUM 40 MG: 40 TABLET, DELAYED RELEASE ORAL at 10:13

## 2025-01-11 RX ADMIN — METOPROLOL SUCCINATE 25 MG: 25 TABLET, FILM COATED, EXTENDED RELEASE ORAL at 16:28

## 2025-01-11 RX ADMIN — Medication 1 CAPSULE: at 22:08

## 2025-01-11 RX ADMIN — CHOLECALCIFEROL TAB 10 MCG (400 UNIT) 200 UNITS: 10 TAB at 10:13

## 2025-01-11 RX ADMIN — POLYVINYL ALCOHOL 1 DROP: 1.4 SOLUTION/ DROPS OPHTHALMIC at 22:13

## 2025-01-11 RX ADMIN — ZINC SULFATE 220 MG (50 MG) CAPSULE 50 MG: CAPSULE at 10:13

## 2025-01-11 RX ADMIN — Medication 250 MG: at 22:08

## 2025-01-11 RX ADMIN — CHOLECALCIFEROL TAB 10 MCG (400 UNIT) 200 UNITS: 10 TAB at 22:08

## 2025-01-11 RX ADMIN — MONTELUKAST 10 MG: 10 TABLET, FILM COATED ORAL at 10:13

## 2025-01-11 RX ADMIN — BACLOFEN 10 MG: 10 TABLET ORAL at 22:08

## 2025-01-11 RX ADMIN — DIPHENHYDRAMINE HYDROCHLORIDE 5 ML: 12.5 LIQUID ORAL at 10:19

## 2025-01-11 RX ADMIN — Medication 90 MG: at 10:14

## 2025-01-11 RX ADMIN — LEVOTHYROXINE SODIUM 25 MCG: 25 TABLET ORAL at 10:13

## 2025-01-11 RX ADMIN — ASPIRIN 81 MG: 81 TABLET, COATED ORAL at 10:13

## 2025-01-11 RX ADMIN — Medication 250 MG: at 10:14

## 2025-01-11 RX ADMIN — SODIUM CHLORIDE, PRESERVATIVE FREE 10 ML: 5 INJECTION INTRAVENOUS at 10:18

## 2025-01-11 RX ADMIN — MAGNESIUM OXIDE 400 MG (241.3 MG MAGNESIUM) TABLET 200 MG: TABLET at 16:28

## 2025-01-11 RX ADMIN — OXYCODONE HYDROCHLORIDE AND ACETAMINOPHEN 500 MG: 500 TABLET ORAL at 10:13

## 2025-01-11 ASSESSMENT — PAIN SCALES - GENERAL
PAINLEVEL_OUTOF10: 4
PAINLEVEL_OUTOF10: 4

## 2025-01-11 ASSESSMENT — PAIN DESCRIPTION - LOCATION: LOCATION: MOUTH

## 2025-01-11 ASSESSMENT — PAIN DESCRIPTION - DESCRIPTORS: DESCRIPTORS: SORE

## 2025-01-11 ASSESSMENT — PAIN SCALES - WONG BAKER: WONGBAKER_NUMERICALRESPONSE: NO HURT

## 2025-01-11 NOTE — CONSULTS
and chest pressure.  Respiratory: SOB at rest and with activity, denies cough, dyspnea at rest, hemoptysis, apnea, and choking.  Gastrointestinal: Denies nausea, vomiting, poor appetite, diarrhea, heartburn or reflux  Genitourinary: Denies dysuria, frequency, urgency or hematuria  Musculoskeletal: Back pain and several near falls denies myalgias, muscle weakness, and bone pain  Neurological: dizziness, lightheadedness,  headache, and focal weakness  Psychological: Denies anxiety and depression  Endocrine: Denies heat intolerance and cold intolerance  Hematopoietic/Lymphatic: Denies bleeding problems and blood transfusions    OBJECTIVE:   BP (!) 141/68   Pulse 86   Temp 98.4 °F (36.9 °C) (Oral)   Resp 16   Wt 72.1 kg (159 lb)   SpO2 100%   BMI 26.46 kg/m²   SpO2 Readings from Last 1 Encounters:   01/10/25 100%        I/O:  No intake or output data in the 24 hours ending 01/10/25 1448       Physical Exam:  General: The patient is lying in bed comfortably without any distress.  Breathing is not labored  HEENT: Pupils are equal round and reactive to light, there are no oral lesions and no post-nasal drip   Neck: supple without adenopathy  Cardiovascular: regular rate and rhythm without murmur or gallop  Respiratory: Minimal SOB with activity and conversation clear to auscultation bilaterally without wheezing or crackles.  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     There is no evidence of right heart strain.            8/20/21 Echo reviewed: ordered and pending this 
Keyona Espitia MD    Facility-Administered Medications Ordered in Other Encounters:     sodium chloride flush 0.9 % injection 10 mL, 10 mL, IntraVENous, PRN, Edd Blake MD, 10 mL at 01/09/25 1125    ALLERGIES:  Dust mite extract, Other, Doxycycline, Ketorolac tromethamine, Lyrica [pregabalin], Morphine, Nifedipine, Pcn [penicillins], and Fentanyl    SOCIAL HISTORY:    Denies drug use and tobacco use.   ETOH use: glass of wine on occasion.     FAMILY HISTORY:   Mother: SSS s/p PPM, AF, pulmonary HTN, DVT/PE.     REVIEW OF SYSTEMS:     Negative except as noted above in HPI.    PHYSICAL EXAM:   /78   Pulse 73   Temp 98.5 °F (36.9 °C) (Oral)   Resp 20   Wt 72.1 kg (159 lb)   SpO2 97%   BMI 26.46 kg/m²   Please note a PE was able to be obtained due to the patient is in strict isolation for COVID-19 in efforts to preserve PPE.       DATA:    Telemetry/EKG: as per Dr. Alvarez.    Diagnostic:  All diagnostic testing and lab work thus far this admission reviewed in detail.    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  There is no evidence of right heart strain.    CT Head WO contrast: 1/9/2025   No acute intracranial abnormality.     Doppler US BLE: 1/9/2025   Positive DVT in the left posterior tibial vein.  This appears  nonocclusive.  There is color flow identified in this area on color Doppler  evaluation.No evidence of venous thrombus in the right lower extremity venous  vessels.    Labs:   CBC:   Recent Labs     01/10/25  0549 01/11/25  1030   WBC 6.0 8.6   HGB 10.9* 12.3   HCT 33.8* 39.1    294     BMP:   Recent Labs     01/10/25  0549 01/10/25  2208 01/11/25  1030     --  138   K 3.9 4.0 4.2   CO2 24  --  28   BUN 9  --  8   CREATININE 0.6  --  0.7   LABGLOM >90  --  >90   CALCIUM 8.4*  --  9.3     Mag:   Recent 
are provided for review. Automated exposure control, iterative reconstruction, and/or weight based adjustment of the mA/kV was utilized to reduce the radiation dose to as low as reasonably achievable. CT PULMONARY ANGIOGRAM WITH IV CONTRAST: with post-processing, volume rendering and 3-D acquisitions. This exam was performed according to our departmental dose optimization program, and includes the following measures where applicable: automated exposure control, adjustment of the mAs and/or kVp according to patient size and/or exam, and an iterative reconstruction algorithm. COMPARISON: None. HISTORY: ORDERING SYSTEM PROVIDED HISTORY: Chest pain radiating to jaw TECHNOLOGIST PROVIDED HISTORY: Additional Contrast?->None STAT Creatinine as needed:->No Reason for exam:->PE protocol FINDINGS: There is no evidence of pleural effusion or pneumothorax.  No airspace or interstitial lung disease is identified.  The tracheobronchial tree is fairly well preserved.  No definite noncalcified pulmonary nodule is identified. There is no evidence of axillary, supraclavicular, mediastinal or hilar lymph node enlargement.  There is no evidence of thoracic aortic aneurysm.  No thoracic aortic dissection is identified.  There is a small sliding-type hiatal hernia.  The adrenal glands are not enlarged.  Bone windows reveal no evidence of acute fracture or osteolytic/osteoblastic lesion. There is no evidence of low-attenuation emboli within the central pulmonary arterial tree.  Multiple pulmonary emboli are identified within the segmental branches of the left upper lobe pulmonary artery, left lower lobe pulmonary artery, right lower lobe and right middle lobe pulmonary arteries and right upper lobe pulmonary arterial segments. There is no evidence of right heart strain.     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

## 2025-01-12 ENCOUNTER — APPOINTMENT (OUTPATIENT)
Age: 65
DRG: 175 | End: 2025-01-12
Attending: INTERNAL MEDICINE
Payer: MEDICARE

## 2025-01-12 LAB
ANION GAP SERPL CALCULATED.3IONS-SCNC: 5 MMOL/L (ref 7–16)
BASOPHILS # BLD: 0.03 K/UL (ref 0–0.2)
BASOPHILS NFR BLD: 1 % (ref 0–2)
BUN SERPL-MCNC: 9 MG/DL (ref 6–23)
CALCIUM SERPL-MCNC: 8.5 MG/DL (ref 8.6–10.2)
CHLORIDE SERPL-SCNC: 106 MMOL/L (ref 98–107)
CO2 SERPL-SCNC: 27 MMOL/L (ref 22–29)
CREAT SERPL-MCNC: 0.7 MG/DL (ref 0.5–1)
ECHO AO ASC DIAM: 2.9 CM
ECHO AO SINUS VALSALVA DIAM: 2.9 CM
ECHO AV AREA PEAK VELOCITY: 2.1 CM2
ECHO AV AREA VTI: 2.2 CM2
ECHO AV CUSP MM: 2 CM
ECHO AV MEAN GRADIENT: 8 MMHG
ECHO AV MEAN VELOCITY: 1.3 M/S
ECHO AV PEAK GRADIENT: 12 MMHG
ECHO AV PEAK VELOCITY: 1.8 M/S
ECHO AV VELOCITY RATIO: 0.61
ECHO AV VTI: 37.1 CM
ECHO EST RA PRESSURE: 3 MMHG
ECHO LA DIAMETER: 4 CM
ECHO LA VOL A-L A2C: 83 ML (ref 22–52)
ECHO LA VOL A-L A4C: 66 ML (ref 22–52)
ECHO LA VOL MOD A2C: 78 ML (ref 22–52)
ECHO LA VOL MOD A4C: 63 ML (ref 22–52)
ECHO LA VOLUME AREA LENGTH: 78 ML
ECHO LV E' LATERAL VELOCITY: 10 CM/S
ECHO LV E' SEPTAL VELOCITY: 8 CM/S
ECHO LV EF PHYSICIAN: 62 %
ECHO LV FRACTIONAL SHORTENING: 28 % (ref 28–44)
ECHO LV INTERNAL DIMENSION DIASTOLIC: 4.6 CM (ref 3.9–5.3)
ECHO LV INTERNAL DIMENSION SYSTOLIC: 3.3 CM
ECHO LV ISOVOLUMETRIC RELAXATION TIME (IVRT): 101.5 MS
ECHO LV IVSD: 1 CM (ref 0.6–0.9)
ECHO LV IVSS: 1.3 CM
ECHO LV MASS 2D: 169.9 G (ref 67–162)
ECHO LV POSTERIOR WALL DIASTOLIC: 1.1 CM (ref 0.6–0.9)
ECHO LV POSTERIOR WALL SYSTOLIC: 1.2 CM
ECHO LV RELATIVE WALL THICKNESS RATIO: 0.48
ECHO LVOT AREA: 3.5 CM2
ECHO LVOT AV VTI INDEX: 0.63
ECHO LVOT DIAM: 2.1 CM
ECHO LVOT MEAN GRADIENT: 3 MMHG
ECHO LVOT PEAK GRADIENT: 5 MMHG
ECHO LVOT PEAK VELOCITY: 1.1 M/S
ECHO LVOT SV: 81.4 ML
ECHO LVOT VTI: 23.5 CM
ECHO MV "A" WAVE DURATION: 138.4 MSEC
ECHO MV A VELOCITY: 0.89 M/S
ECHO MV AREA PHT: 3.6 CM2
ECHO MV AREA VTI: 2.4 CM2
ECHO MV E DECELERATION TIME (DT): 215.2 MS
ECHO MV E VELOCITY: 1.02 M/S
ECHO MV E/A RATIO: 1.15
ECHO MV E/E' LATERAL: 10.2
ECHO MV E/E' RATIO (AVERAGED): 11.48
ECHO MV E/E' SEPTAL: 12.75
ECHO MV LVOT VTI INDEX: 1.44
ECHO MV MAX VELOCITY: 1.1 M/S
ECHO MV MEAN GRADIENT: 2 MMHG
ECHO MV MEAN VELOCITY: 0.7 M/S
ECHO MV PEAK GRADIENT: 5 MMHG
ECHO MV PRESSURE HALF TIME (PHT): 60.3 MS
ECHO MV VTI: 33.8 CM
ECHO PULMONARY ARTERY END DIASTOLIC PRESSURE: 3 MMHG
ECHO PV MAX VELOCITY: 1 M/S
ECHO PV MEAN GRADIENT: 2 MMHG
ECHO PV MEAN VELOCITY: 0.7 M/S
ECHO PV PEAK GRADIENT: 4 MMHG
ECHO PV REGURGITANT MAX VELOCITY: 0.9 M/S
ECHO PV VTI: 25.3 CM
ECHO PVEIN A DURATION: 133.8 MS
ECHO PVEIN A VELOCITY: 0.4 M/S
ECHO PVEIN PEAK D VELOCITY: 0.6 M/S
ECHO PVEIN PEAK S VELOCITY: 0.9 M/S
ECHO PVEIN S/D RATIO: 1.5
ECHO RIGHT VENTRICULAR SYSTOLIC PRESSURE (RVSP): 37 MMHG
ECHO RV INTERNAL DIMENSION: 3.1 CM
ECHO RV TAPSE: 2.3 CM (ref 1.7–?)
ECHO TV REGURGITANT MAX VELOCITY: 2.93 M/S
ECHO TV REGURGITANT PEAK GRADIENT: 34 MMHG
EOSINOPHIL # BLD: 0.19 K/UL (ref 0.05–0.5)
EOSINOPHILS RELATIVE PERCENT: 3 % (ref 0–6)
ERYTHROCYTE [DISTWIDTH] IN BLOOD BY AUTOMATED COUNT: 14.4 % (ref 11.5–15)
GFR, ESTIMATED: >90 ML/MIN/1.73M2
GLUCOSE SERPL-MCNC: 96 MG/DL (ref 74–99)
HCT VFR BLD AUTO: 32.6 % (ref 34–48)
HGB BLD-MCNC: 10.4 G/DL (ref 11.5–15.5)
IMM GRANULOCYTES # BLD AUTO: 0.03 K/UL (ref 0–0.58)
IMM GRANULOCYTES NFR BLD: 1 % (ref 0–5)
LYMPHOCYTES NFR BLD: 1.74 K/UL (ref 1.5–4)
LYMPHOCYTES RELATIVE PERCENT: 31 % (ref 20–42)
MAGNESIUM SERPL-MCNC: 2.2 MG/DL (ref 1.6–2.6)
MCH RBC QN AUTO: 30.4 PG (ref 26–35)
MCHC RBC AUTO-ENTMCNC: 31.9 G/DL (ref 32–34.5)
MCV RBC AUTO: 95.3 FL (ref 80–99.9)
MONOCYTES NFR BLD: 0.6 K/UL (ref 0.1–0.95)
MONOCYTES NFR BLD: 11 % (ref 2–12)
NEUTROPHILS NFR BLD: 54 % (ref 43–80)
NEUTS SEG NFR BLD: 3.06 K/UL (ref 1.8–7.3)
PLATELET # BLD AUTO: 247 K/UL (ref 130–450)
PMV BLD AUTO: 9.4 FL (ref 7–12)
POTASSIUM SERPL-SCNC: 3.8 MMOL/L (ref 3.5–5)
RBC # BLD AUTO: 3.42 M/UL (ref 3.5–5.5)
SODIUM SERPL-SCNC: 138 MMOL/L (ref 132–146)
WBC OTHER # BLD: 5.7 K/UL (ref 4.5–11.5)

## 2025-01-12 PROCEDURE — 6370000000 HC RX 637 (ALT 250 FOR IP): Performed by: NURSE PRACTITIONER

## 2025-01-12 PROCEDURE — 85025 COMPLETE CBC W/AUTO DIFF WBC: CPT

## 2025-01-12 PROCEDURE — 83735 ASSAY OF MAGNESIUM: CPT

## 2025-01-12 PROCEDURE — 2500000003 HC RX 250 WO HCPCS: Performed by: NURSE PRACTITIONER

## 2025-01-12 PROCEDURE — 6370000000 HC RX 637 (ALT 250 FOR IP): Performed by: INTERNAL MEDICINE

## 2025-01-12 PROCEDURE — 2060000000 HC ICU INTERMEDIATE R&B

## 2025-01-12 PROCEDURE — 99232 SBSQ HOSP IP/OBS MODERATE 35: CPT | Performed by: INTERNAL MEDICINE

## 2025-01-12 PROCEDURE — 93306 TTE W/DOPPLER COMPLETE: CPT | Performed by: INTERNAL MEDICINE

## 2025-01-12 PROCEDURE — 93306 TTE W/DOPPLER COMPLETE: CPT

## 2025-01-12 PROCEDURE — 80048 BASIC METABOLIC PNL TOTAL CA: CPT

## 2025-01-12 RX ORDER — POTASSIUM CHLORIDE 1500 MG/1
20 TABLET, EXTENDED RELEASE ORAL ONCE
Status: COMPLETED | OUTPATIENT
Start: 2025-01-12 | End: 2025-01-12

## 2025-01-12 RX ADMIN — BACLOFEN 10 MG: 10 TABLET ORAL at 21:57

## 2025-01-12 RX ADMIN — TRAMADOL HYDROCHLORIDE 50 MG: 50 TABLET, COATED ORAL at 14:31

## 2025-01-12 RX ADMIN — MONTELUKAST 10 MG: 10 TABLET, FILM COATED ORAL at 09:27

## 2025-01-12 RX ADMIN — AMITRIPTYLINE HYDROCHLORIDE 20 MG: 10 TABLET, FILM COATED ORAL at 21:54

## 2025-01-12 RX ADMIN — MAGNESIUM OXIDE 400 MG (241.3 MG MAGNESIUM) TABLET 200 MG: TABLET at 18:37

## 2025-01-12 RX ADMIN — ROSUVASTATIN CALCIUM 5 MG: 5 TABLET, FILM COATED ORAL at 21:57

## 2025-01-12 RX ADMIN — POLYVINYL ALCOHOL 1 DROP: 1.4 SOLUTION/ DROPS OPHTHALMIC at 22:02

## 2025-01-12 RX ADMIN — LEVOTHYROXINE SODIUM 25 MCG: 25 TABLET ORAL at 06:01

## 2025-01-12 RX ADMIN — DULOXETINE 60 MG: 60 CAPSULE, DELAYED RELEASE ORAL at 09:27

## 2025-01-12 RX ADMIN — SODIUM CHLORIDE, PRESERVATIVE FREE 10 ML: 5 INJECTION INTRAVENOUS at 09:30

## 2025-01-12 RX ADMIN — METOPROLOL SUCCINATE 25 MG: 25 TABLET, FILM COATED, EXTENDED RELEASE ORAL at 09:27

## 2025-01-12 RX ADMIN — DIPHENHYDRAMINE HYDROCHLORIDE 5 ML: 12.5 LIQUID ORAL at 09:31

## 2025-01-12 RX ADMIN — OXYCODONE HYDROCHLORIDE AND ACETAMINOPHEN 500 MG: 500 TABLET ORAL at 09:27

## 2025-01-12 RX ADMIN — POLYVINYL ALCOHOL 1 DROP: 1.4 SOLUTION/ DROPS OPHTHALMIC at 09:31

## 2025-01-12 RX ADMIN — Medication 90 MG: at 09:27

## 2025-01-12 RX ADMIN — Medication 250 MG: at 09:27

## 2025-01-12 RX ADMIN — APIXABAN 10 MG: 5 TABLET, FILM COATED ORAL at 06:01

## 2025-01-12 RX ADMIN — DIPHENHYDRAMINE HYDROCHLORIDE 5 ML: 12.5 LIQUID ORAL at 22:02

## 2025-01-12 RX ADMIN — ASPIRIN 81 MG: 81 TABLET, COATED ORAL at 09:27

## 2025-01-12 RX ADMIN — SODIUM CHLORIDE, PRESERVATIVE FREE 10 ML: 5 INJECTION INTRAVENOUS at 22:01

## 2025-01-12 RX ADMIN — APIXABAN 10 MG: 5 TABLET, FILM COATED ORAL at 18:37

## 2025-01-12 RX ADMIN — CHOLECALCIFEROL TAB 10 MCG (400 UNIT) 200 UNITS: 10 TAB at 21:57

## 2025-01-12 RX ADMIN — POTASSIUM CHLORIDE 20 MEQ: 1500 TABLET, EXTENDED RELEASE ORAL at 09:27

## 2025-01-12 RX ADMIN — Medication 1 CAPSULE: at 21:57

## 2025-01-12 RX ADMIN — BACLOFEN 10 MG: 10 TABLET ORAL at 09:27

## 2025-01-12 RX ADMIN — Medication 1 TABLET: at 09:27

## 2025-01-12 RX ADMIN — CHOLECALCIFEROL TAB 10 MCG (400 UNIT) 200 UNITS: 10 TAB at 09:27

## 2025-01-12 RX ADMIN — ZINC SULFATE 220 MG (50 MG) CAPSULE 50 MG: CAPSULE at 09:27

## 2025-01-12 RX ADMIN — Medication 250 MG: at 21:57

## 2025-01-12 RX ADMIN — POLYETHYLENE GLYCOL 3350 17 G: 17 POWDER, FOR SOLUTION ORAL at 22:00

## 2025-01-12 RX ADMIN — PANTOPRAZOLE SODIUM 40 MG: 40 TABLET, DELAYED RELEASE ORAL at 06:01

## 2025-01-12 ASSESSMENT — PAIN DESCRIPTION - FREQUENCY: FREQUENCY: CONTINUOUS

## 2025-01-12 ASSESSMENT — PAIN DESCRIPTION - LOCATION
LOCATION: BACK
LOCATION: BACK

## 2025-01-12 ASSESSMENT — PAIN DESCRIPTION - DESCRIPTORS: DESCRIPTORS: ACHING

## 2025-01-12 ASSESSMENT — PAIN SCALES - WONG BAKER: WONGBAKER_NUMERICALRESPONSE: NO HURT

## 2025-01-12 ASSESSMENT — PAIN SCALES - GENERAL
PAINLEVEL_OUTOF10: 0
PAINLEVEL_OUTOF10: 6

## 2025-01-12 ASSESSMENT — PAIN DESCRIPTION - ONSET: ONSET: ON-GOING

## 2025-01-12 ASSESSMENT — PAIN - FUNCTIONAL ASSESSMENT: PAIN_FUNCTIONAL_ASSESSMENT: ACTIVITIES ARE NOT PREVENTED

## 2025-01-12 ASSESSMENT — PAIN DESCRIPTION - PAIN TYPE: TYPE: CHRONIC PAIN

## 2025-01-13 VITALS
WEIGHT: 159 LBS | DIASTOLIC BLOOD PRESSURE: 71 MMHG | RESPIRATION RATE: 18 BRPM | HEART RATE: 69 BPM | TEMPERATURE: 97.9 F | BODY MASS INDEX: 26.49 KG/M2 | OXYGEN SATURATION: 94 % | HEIGHT: 65 IN | SYSTOLIC BLOOD PRESSURE: 116 MMHG

## 2025-01-13 LAB
ANION GAP SERPL CALCULATED.3IONS-SCNC: 7 MMOL/L (ref 7–16)
BASOPHILS # BLD: 0.03 K/UL (ref 0–0.2)
BASOPHILS NFR BLD: 1 % (ref 0–2)
BUN SERPL-MCNC: 11 MG/DL (ref 6–23)
CALCIUM SERPL-MCNC: 8.6 MG/DL (ref 8.6–10.2)
CHLORIDE SERPL-SCNC: 107 MMOL/L (ref 98–107)
CO2 SERPL-SCNC: 26 MMOL/L (ref 22–29)
CREAT SERPL-MCNC: 0.7 MG/DL (ref 0.5–1)
EOSINOPHIL # BLD: 0.21 K/UL (ref 0.05–0.5)
EOSINOPHILS RELATIVE PERCENT: 3 % (ref 0–6)
ERYTHROCYTE [DISTWIDTH] IN BLOOD BY AUTOMATED COUNT: 14.4 % (ref 11.5–15)
GFR, ESTIMATED: >90 ML/MIN/1.73M2
GLUCOSE SERPL-MCNC: 86 MG/DL (ref 74–99)
HCT VFR BLD AUTO: 34.1 % (ref 34–48)
HGB BLD-MCNC: 11 G/DL (ref 11.5–15.5)
IMM GRANULOCYTES # BLD AUTO: 0.03 K/UL (ref 0–0.58)
IMM GRANULOCYTES NFR BLD: 1 % (ref 0–5)
LYMPHOCYTES NFR BLD: 1.87 K/UL (ref 1.5–4)
LYMPHOCYTES RELATIVE PERCENT: 30 % (ref 20–42)
MCH RBC QN AUTO: 30.8 PG (ref 26–35)
MCHC RBC AUTO-ENTMCNC: 32.3 G/DL (ref 32–34.5)
MCV RBC AUTO: 95.5 FL (ref 80–99.9)
MONOCYTES NFR BLD: 0.67 K/UL (ref 0.1–0.95)
MONOCYTES NFR BLD: 11 % (ref 2–12)
NEUTROPHILS NFR BLD: 55 % (ref 43–80)
NEUTS SEG NFR BLD: 3.48 K/UL (ref 1.8–7.3)
PLATELET # BLD AUTO: 252 K/UL (ref 130–450)
PMV BLD AUTO: 9.5 FL (ref 7–12)
POTASSIUM SERPL-SCNC: 4.1 MMOL/L (ref 3.5–5)
RBC # BLD AUTO: 3.57 M/UL (ref 3.5–5.5)
SODIUM SERPL-SCNC: 140 MMOL/L (ref 132–146)
WBC OTHER # BLD: 6.3 K/UL (ref 4.5–11.5)

## 2025-01-13 PROCEDURE — 36415 COLL VENOUS BLD VENIPUNCTURE: CPT

## 2025-01-13 PROCEDURE — 6370000000 HC RX 637 (ALT 250 FOR IP): Performed by: NURSE PRACTITIONER

## 2025-01-13 PROCEDURE — 93246 EXT ECG>7D<15D RECORDING: CPT

## 2025-01-13 PROCEDURE — 85025 COMPLETE CBC W/AUTO DIFF WBC: CPT

## 2025-01-13 PROCEDURE — 6370000000 HC RX 637 (ALT 250 FOR IP): Performed by: INTERNAL MEDICINE

## 2025-01-13 PROCEDURE — 80048 BASIC METABOLIC PNL TOTAL CA: CPT

## 2025-01-13 PROCEDURE — 2500000003 HC RX 250 WO HCPCS: Performed by: NURSE PRACTITIONER

## 2025-01-13 RX ORDER — TRAMADOL HYDROCHLORIDE 50 MG/1
50 TABLET ORAL EVERY 6 HOURS PRN
Qty: 12 TABLET | Refills: 0 | Status: SHIPPED | OUTPATIENT
Start: 2025-01-13 | End: 2025-01-16

## 2025-01-13 RX ORDER — LORAZEPAM 0.5 MG/1
0.5 TABLET ORAL EVERY 12 HOURS PRN
Status: DISCONTINUED | OUTPATIENT
Start: 2025-01-13 | End: 2025-01-13 | Stop reason: HOSPADM

## 2025-01-13 RX ORDER — METOPROLOL SUCCINATE 25 MG/1
25 TABLET, EXTENDED RELEASE ORAL DAILY
Qty: 30 TABLET | Refills: 3 | Status: SHIPPED | OUTPATIENT
Start: 2025-01-13

## 2025-01-13 RX ADMIN — DULOXETINE 60 MG: 60 CAPSULE, DELAYED RELEASE ORAL at 08:50

## 2025-01-13 RX ADMIN — LEVOTHYROXINE SODIUM 25 MCG: 25 TABLET ORAL at 05:19

## 2025-01-13 RX ADMIN — Medication 90 MG: at 08:50

## 2025-01-13 RX ADMIN — Medication 250 MG: at 08:51

## 2025-01-13 RX ADMIN — Medication 1 TABLET: at 08:51

## 2025-01-13 RX ADMIN — SODIUM CHLORIDE, PRESERVATIVE FREE 10 ML: 5 INJECTION INTRAVENOUS at 08:53

## 2025-01-13 RX ADMIN — PANTOPRAZOLE SODIUM 40 MG: 40 TABLET, DELAYED RELEASE ORAL at 05:18

## 2025-01-13 RX ADMIN — METOPROLOL SUCCINATE 25 MG: 25 TABLET, FILM COATED, EXTENDED RELEASE ORAL at 08:51

## 2025-01-13 RX ADMIN — CHOLECALCIFEROL TAB 10 MCG (400 UNIT) 200 UNITS: 10 TAB at 08:51

## 2025-01-13 RX ADMIN — ERGOCALCIFEROL 50000 UNITS: 1.25 CAPSULE ORAL at 08:51

## 2025-01-13 RX ADMIN — BACLOFEN 10 MG: 10 TABLET ORAL at 08:51

## 2025-01-13 RX ADMIN — ASPIRIN 81 MG: 81 TABLET, COATED ORAL at 08:51

## 2025-01-13 RX ADMIN — APIXABAN 10 MG: 5 TABLET, FILM COATED ORAL at 05:18

## 2025-01-13 RX ADMIN — OXYCODONE HYDROCHLORIDE AND ACETAMINOPHEN 500 MG: 500 TABLET ORAL at 08:51

## 2025-01-13 RX ADMIN — POLYETHYLENE GLYCOL 3350 17 G: 17 POWDER, FOR SOLUTION ORAL at 14:01

## 2025-01-13 RX ADMIN — ZINC SULFATE 220 MG (50 MG) CAPSULE 50 MG: CAPSULE at 08:51

## 2025-01-13 RX ADMIN — LORAZEPAM 0.5 MG: 0.5 TABLET ORAL at 14:01

## 2025-01-13 RX ADMIN — MONTELUKAST 10 MG: 10 TABLET, FILM COATED ORAL at 08:51

## 2025-01-13 NOTE — DISCHARGE SUMMARY
BENZACLIN     coenzyme Q-10 100 MG capsule     cycloSPORINE 0.05 % ophthalmic emulsion  Commonly known as: RESTASIS     diazePAM 5 MG tablet  Commonly known as: VALIUM     diclofenac sodium 1 % Gel  Commonly known as: VOLTAREN     diphenhydrAMINE 25 MG tablet  Commonly known as: BENADRYL     DULoxetine 60 MG extended release capsule  Commonly known as: CYMBALTA     EMGALITY SC     guaiFENesin 600 MG extended release tablet  Commonly known as: MUCINEX     Iron 18 MG Tbcr     Levoxyl 25 MCG tablet  Generic drug: levothyroxine  one tablet daily Mon-Fri, take none on Sa and Sun     loteprednol 0.2 % Susp  Commonly known as: ALREX     magnesium 250 MG Tabs tablet  Commonly known as: MAGNESIUM-OXIDE     melatonin 3 MG Tabs tablet     montelukast 10 MG tablet  Commonly known as: SINGULAIR     omeprazole 20 MG EC tablet     PROBIOTIC DAILY PO     promethazine 25 MG tablet  Commonly known as: PHENERGAN     QC Tumeric Complex 500 MG Caps  Generic drug: turmeric     rosuvastatin 5 MG tablet  Commonly known as: CRESTOR     SUMAtriptan 100 MG tablet  Commonly known as: IMITREX     therapeutic multivitamin-minerals tablet     tretinoin microspheres 0.04 % gel  Commonly known as: RETIN-A MICRO     vitamin C 250 MG tablet     vitamin D 1.25 MG (97160 UT) Caps capsule  Commonly known as: ERGOCALCIFEROL     zolpidem 5 MG tablet  Commonly known as: AMBIEN               Where to Get Your Medications        These medications were sent to [id number] - [place], [place] - [address] -  [phone] -  [phone]  [address] [place] [address] [place] [address]      Phone: [phone]   metoprolol succinate 25 MG extended release tablet       You can get these medications from any pharmacy    Bring a paper prescription for each of these medications  traMADol 50 MG tablet       Activity: activity as tolerated  Diet: cardiac diet    Pt has been advised to:    Follow-up with Jaya Quiros Jr., MD in 1 week.  Follow-up with consultants as  Medical Necessity Information: It is in your best interest to select a reason for this procedure from the list below. All of these items fulfill various CMS LCD requirements except lesion extends to a margin. Include Z78.9 (Other Specified Conditions Influencing Health Status) As An Associated Diagnosis?: No Medical Necessity Clause: This procedure was medically necessary because the lesion that was treated was: is much darker in color and bleeds occasionally Lab: 6 Lab Facility: 3 Size Of Lesion In Cm: 0.5 X Size Of Lesion In Cm (Optional): 0 Size Of Margin In Cm: 0.1 Anesthesia Volume In Cc: 1 Eye Clamp Note Details: An eye clamp was used during the procedure. Excision Method: Elliptical Saucerization Depth: dermis and superficial adipose tissue Repair Type: None (Simple) Suturegard Retention Suture: 2-0 Nylon Retention Suture Bite Size: 3 mm Length To Time In Minutes Device Was In Place: 10 Undermining Type: Entire Wound Debridement Text: The wound edges were debrided prior to proceeding with the closure to facilitate wound healing. Helical Rim Text: The closure involved the helical rim. Vermilion Border Text: The closure involved the vermilion border. Nostril Rim Text: The closure involved the nostril rim. Retention Suture Text: Retention sutures were placed to support the closure and prevent dehiscence. Suture Removal: 14 days Epidermal Closure Graft Donor Site (Optional): simple interrupted Graft Donor Site Bandage (Optional-Leave Blank If You Don't Want In Note): Steri-strips and a pressure bandage were applied to the donor site. Detail Level: Detailed Excision Depth: adipose tissue Scalpel Size: 15 blade Anesthesia Type: 1% lidocaine with epinephrine Hemostasis: Pressure Estimated Blood Loss (Cc): minimal Repair Depth: use same depth as excision depth Repair Anesthesia Method: local infiltration Epidermal Sutures: 5-0 Monoswift Wound Care: Bacitracin Dressing: pressure dressing Suturegard Intro: Intraoperative tissue expansion was performed, utilizing the SUTUREGARD device, in order to reduce wound tension. Suturegard Body: The suture ends were repeatedly re-tightened and re-clamped to achieve the desired tissue expansion. Hemigard Intro: Due to skin fragility and wound tension, it was decided to use HEMIGARD adhesive retention suture devices to permit a linear closure. The skin was cleaned and dried for a 6cm distance away from the wound. Excessive hair, if present, was removed to allow for adhesion. Hemigard Postcare Instructions: The HEMIGARD strips are to remain completely dry for at least 5-7 days. Positioning (Leave Blank If You Do Not Want): The patient was placed in a comfortable position exposing the surgical site. Complex Repair Preamble Text (Leave Blank If You Do Not Want): Extensive wide undermining was performed. Intermediate Repair Preamble Text (Leave Blank If You Do Not Want): Undermining was performed with blunt dissection. Fusiform Excision Additional Text (Leave Blank If You Do Not Want): The margin was drawn around the clinically apparent lesion.  A fusiform shape was then drawn on the skin incorporating the lesion and margins.  Incisions were then made along these lines to the appropriate tissue plane and the lesion was extirpated. Eliptical Excision Additional Text (Leave Blank If You Do Not Want): The margin was drawn around the clinically apparent lesion.  An elliptical shape was then drawn on the skin incorporating the lesion and margins.  Incisions were then made along these lines to the appropriate tissue plane and the lesion was extirpated. Saucerization Excision Additional Text (Leave Blank If You Do Not Want): The margin was drawn around the clinically apparent lesion.  Incisions were then made along these lines, in a tangential fashion, to the appropriate tissue plane and the lesion was extirpated. Slit Excision Additional Text (Leave Blank If You Do Not Want): A linear line was drawn on the skin overlying the lesion. An incision was made slowly until the lesion was visualized.  Once visualized, the lesion was removed with blunt dissection. Excisional Biopsy Additional Text (Leave Blank If You Do Not Want): The margin was drawn around the clinically apparent lesion. An elliptical shape was then drawn on the skin incorporating the lesion and margins.  Incisions were then made along these lines to the appropriate tissue plane and the lesion was extirpated. Perilesional Excision Additional Text (Leave Blank If You Do Not Want): The margin was drawn around the clinically apparent lesion. Incisions were then made along these lines to the appropriate tissue plane and the lesion was extirpated. Repair Performed By Another Provider Text (Leave Blank If You Do Not Want): After the tissue was excised the defect was repaired by another provider. No Repair - Repaired With Adjacent Surgical Defect Text (Leave Blank If You Do Not Want): After the excision the defect was repaired concurrently with another surgical defect which was in close approximation. Adjacent Tissue Transfer Text: The defect edges were debeveled with a #15 scalpel blade. Given the location of the defect and the proximity to free margins an adjacent tissue transfer was deemed most appropriate. Using a sterile surgical marker, an appropriate flap was drawn incorporating the defect and placing the expected incisions within the relaxed skin tension lines where possible. The area thus outlined was incised deep to adipose tissue with a #15 scalpel blade. The skin margins were undermined to an appropriate distance in all directions utilizing iris scissors and carried over to close the primary defect. Advancement Flap (Single) Text: The defect edges were debeveled with a #15 scalpel blade.  Given the location of the defect and the proximity to free margins a single advancement flap was deemed most appropriate.  Using a sterile surgical marker, an appropriate advancement flap was drawn incorporating the defect and placing the expected incisions within the relaxed skin tension lines where possible.    The area thus outlined was incised deep to adipose tissue with a #15 scalpel blade.  The skin margins were undermined to an appropriate distance in all directions utilizing iris scissors. Advancement Flap (Double) Text: The defect edges were debeveled with a #15 scalpel blade.  Given the location of the defect and the proximity to free margins a double advancement flap was deemed most appropriate.  Using a sterile surgical marker, the appropriate advancement flaps were drawn incorporating the defect and placing the expected incisions within the relaxed skin tension lines where possible.    The area thus outlined was incised deep to adipose tissue with a #15 scalpel blade.  The skin margins were undermined to an appropriate distance in all directions utilizing iris scissors. Burow's Advancement Flap Text: The defect edges were debeveled with a #15 scalpel blade.  Given the location of the defect and the proximity to free margins a Burow's advancement flap was deemed most appropriate.  Using a sterile surgical marker, the appropriate advancement flap was drawn incorporating the defect and placing the expected incisions within the relaxed skin tension lines where possible.    The area thus outlined was incised deep to adipose tissue with a #15 scalpel blade.  The skin margins were undermined to an appropriate distance in all directions utilizing iris scissors. Chonodrocutaneous Helical Advancement Flap Text: The defect edges were debeveled with a #15 scalpel blade.  Given the location of the defect and the proximity to free margins a chondrocutaneous helical advancement flap was deemed most appropriate.  Using a sterile surgical marker, the appropriate advancement flap was drawn incorporating the defect and placing the expected incisions within the relaxed skin tension lines where possible.    The area thus outlined was incised deep to adipose tissue with a #15 scalpel blade.  The skin margins were undermined to an appropriate distance in all directions utilizing iris scissors. Crescentic Advancement Flap Text: The defect edges were debeveled with a #15 scalpel blade.  Given the location of the defect and the proximity to free margins a crescentic advancement flap was deemed most appropriate.  Using a sterile surgical marker, the appropriate advancement flap was drawn incorporating the defect and placing the expected incisions within the relaxed skin tension lines where possible.    The area thus outlined was incised deep to adipose tissue with a #15 scalpel blade.  The skin margins were undermined to an appropriate distance in all directions utilizing iris scissors. A-T Advancement Flap Text: The defect edges were debeveled with a #15 scalpel blade.  Given the location of the defect, shape of the defect and the proximity to free margins an A-T advancement flap was deemed most appropriate.  Using a sterile surgical marker, an appropriate advancement flap was drawn incorporating the defect and placing the expected incisions within the relaxed skin tension lines where possible.    The area thus outlined was incised deep to adipose tissue with a #15 scalpel blade.  The skin margins were undermined to an appropriate distance in all directions utilizing iris scissors. O-T Advancement Flap Text: The defect edges were debeveled with a #15 scalpel blade.  Given the location of the defect, shape of the defect and the proximity to free margins an O-T advancement flap was deemed most appropriate.  Using a sterile surgical marker, an appropriate advancement flap was drawn incorporating the defect and placing the expected incisions within the relaxed skin tension lines where possible.    The area thus outlined was incised deep to adipose tissue with a #15 scalpel blade.  The skin margins were undermined to an appropriate distance in all directions utilizing iris scissors. O-L Flap Text: The defect edges were debeveled with a #15 scalpel blade.  Given the location of the defect, shape of the defect and the proximity to free margins an O-L flap was deemed most appropriate.  Using a sterile surgical marker, an appropriate advancement flap was drawn incorporating the defect and placing the expected incisions within the relaxed skin tension lines where possible.    The area thus outlined was incised deep to adipose tissue with a #15 scalpel blade.  The skin margins were undermined to an appropriate distance in all directions utilizing iris scissors. O-Z Flap Text: The defect edges were debeveled with a #15 scalpel blade.  Given the location of the defect, shape of the defect and the proximity to free margins an O-Z flap was deemed most appropriate.  Using a sterile surgical marker, an appropriate transposition flap was drawn incorporating the defect and placing the expected incisions within the relaxed skin tension lines where possible. The area thus outlined was incised deep to adipose tissue with a #15 scalpel blade.  The skin margins were undermined to an appropriate distance in all directions utilizing iris scissors. Double O-Z Flap Text: The defect edges were debeveled with a #15 scalpel blade.  Given the location of the defect, shape of the defect and the proximity to free margins a Double O-Z flap was deemed most appropriate.  Using a sterile surgical marker, an appropriate transposition flap was drawn incorporating the defect and placing the expected incisions within the relaxed skin tension lines where possible. The area thus outlined was incised deep to adipose tissue with a #15 scalpel blade.  The skin margins were undermined to an appropriate distance in all directions utilizing iris scissors. V-Y Flap Text: The defect edges were debeveled with a #15 scalpel blade.  Given the location of the defect, shape of the defect and the proximity to free margins a V-Y flap was deemed most appropriate.  Using a sterile surgical marker, an appropriate advancement flap was drawn incorporating the defect and placing the expected incisions within the relaxed skin tension lines where possible.    The area thus outlined was incised deep to adipose tissue with a #15 scalpel blade.  The skin margins were undermined to an appropriate distance in all directions utilizing iris scissors. Advancement-Rotation Flap Text: The defect edges were debeveled with a #15 scalpel blade.  Given the location of the defect, shape of the defect and the proximity to free margins an advancement-rotation flap was deemed most appropriate.  Using a sterile surgical marker, an appropriate flap was drawn incorporating the defect and placing the expected incisions within the relaxed skin tension lines where possible. The area thus outlined was incised deep to adipose tissue with a #15 scalpel blade.  The skin margins were undermined to an appropriate distance in all directions utilizing iris scissors. Mercedes Flap Text: The defect edges were debeveled with a #15 scalpel blade.  Given the location of the defect, shape of the defect and the proximity to free margins a Mercedes flap was deemed most appropriate.  Using a sterile surgical marker, an appropriate advancement flap was drawn incorporating the defect and placing the expected incisions within the relaxed skin tension lines where possible. The area thus outlined was incised deep to adipose tissue with a #15 scalpel blade.  The skin margins were undermined to an appropriate distance in all directions utilizing iris scissors. Modified Advancement Flap Text: The defect edges were debeveled with a #15 scalpel blade.  Given the location of the defect, shape of the defect and the proximity to free margins a modified advancement flap was deemed most appropriate.  Using a sterile surgical marker, an appropriate advancement flap was drawn incorporating the defect and placing the expected incisions within the relaxed skin tension lines where possible.    The area thus outlined was incised deep to adipose tissue with a #15 scalpel blade.  The skin margins were undermined to an appropriate distance in all directions utilizing iris scissors. Mucosal Advancement Flap Text: Given the location of the defect, shape of the defect and the proximity to free margins a mucosal advancement flap was deemed most appropriate. Incisions were made with a 15 blade scalpel in the appropriate fashion along the cutaneous vermillion border and the mucosal lip. The remaining actinically damaged mucosal tissue was excised.  The mucosal advancement flap was then elevated to the gingival sulcus with care taken to preserve the neurovascular structures and advanced into the primary defect. Care was taken to ensure that precise realignment of the vermilion border was achieved. Peng Advancement Flap Text: The defect edges were debeveled with a #15 scalpel blade.  Given the location of the defect, shape of the defect and the proximity to free margins a Peng advancement flap was deemed most appropriate.  Using a sterile surgical marker, an appropriate advancement flap was drawn incorporating the defect and placing the expected incisions within the relaxed skin tension lines where possible. The area thus outlined was incised deep to adipose tissue with a #15 scalpel blade.  The skin margins were undermined to an appropriate distance in all directions utilizing iris scissors. Hatchet Flap Text: The defect edges were debeveled with a #15 scalpel blade.  Given the location of the defect, shape of the defect and the proximity to free margins a hatchet flap was deemed most appropriate.  Using a sterile surgical marker, an appropriate hatchet flap was drawn incorporating the defect and placing the expected incisions within the relaxed skin tension lines where possible.    The area thus outlined was incised deep to adipose tissue with a #15 scalpel blade.  The skin margins were undermined to an appropriate distance in all directions utilizing iris scissors. Rotation Flap Text: The defect edges were debeveled with a #15 scalpel blade.  Given the location of the defect, shape of the defect and the proximity to free margins a rotation flap was deemed most appropriate.  Using a sterile surgical marker, an appropriate rotation flap was drawn incorporating the defect and placing the expected incisions within the relaxed skin tension lines where possible.    The area thus outlined was incised deep to adipose tissue with a #15 scalpel blade.  The skin margins were undermined to an appropriate distance in all directions utilizing iris scissors. Bilateral Rotation Flap Text: The defect edges were debeveled with a #15 scalpel blade. Given the location of the defect, shape of the defect and the proximity to free margins a bilateral rotation flap was deemed most appropriate. Using a sterile surgical marker, an appropriate rotation flap was drawn incorporating the defect and placing the expected incisions within the relaxed skin tension lines where possible. The area thus outlined was incised deep to adipose tissue with a #15 scalpel blade. The skin margins were undermined to an appropriate distance in all directions utilizing iris scissors. Following this, the designed flap was carried over into the primary defect and sutured into place. Spiral Flap Text: The defect edges were debeveled with a #15 scalpel blade.  Given the location of the defect, shape of the defect and the proximity to free margins a spiral flap was deemed most appropriate.  Using a sterile surgical marker, an appropriate rotation flap was drawn incorporating the defect and placing the expected incisions within the relaxed skin tension lines where possible. The area thus outlined was incised deep to adipose tissue with a #15 scalpel blade.  The skin margins were undermined to an appropriate distance in all directions utilizing iris scissors. Staged Advancement Flap Text: The defect edges were debeveled with a #15 scalpel blade.  Given the location of the defect, shape of the defect and the proximity to free margins a staged advancement flap was deemed most appropriate.  Using a sterile surgical marker, an appropriate advancement flap was drawn incorporating the defect and placing the expected incisions within the relaxed skin tension lines where possible. The area thus outlined was incised deep to adipose tissue with a #15 scalpel blade.  The skin margins were undermined to an appropriate distance in all directions utilizing iris scissors. Star Wedge Flap Text: The defect edges were debeveled with a #15 scalpel blade.  Given the location of the defect, shape of the defect and the proximity to free margins a star wedge flap was deemed most appropriate.  Using a sterile surgical marker, an appropriate rotation flap was drawn incorporating the defect and placing the expected incisions within the relaxed skin tension lines where possible. The area thus outlined was incised deep to adipose tissue with a #15 scalpel blade.  The skin margins were undermined to an appropriate distance in all directions utilizing iris scissors. Transposition Flap Text: The defect edges were debeveled with a #15 scalpel blade.  Given the location of the defect and the proximity to free margins a transposition flap was deemed most appropriate.  Using a sterile surgical marker, an appropriate transposition flap was drawn incorporating the defect.    The area thus outlined was incised deep to adipose tissue with a #15 scalpel blade.  The skin margins were undermined to an appropriate distance in all directions utilizing iris scissors. Muscle Hinge Flap Text: The defect edges were debeveled with a #15 scalpel blade.  Given the size, depth and location of the defect and the proximity to free margins a muscle hinge flap was deemed most appropriate.  Using a sterile surgical marker, an appropriate hinge flap was drawn incorporating the defect. The area thus outlined was incised with a #15 scalpel blade.  The skin margins were undermined to an appropriate distance in all directions utilizing iris scissors. Mustarde Flap Text: The defect edges were debeveled with a #15 scalpel blade.  Given the size, depth and location of the defect and the proximity to free margins a Mustarde flap was deemed most appropriate.  Using a sterile surgical marker, an appropriate flap was drawn incorporating the defect. The area thus outlined was incised with a #15 scalpel blade.  The skin margins were undermined to an appropriate distance in all directions utilizing iris scissors. Nasal Turnover Hinge Flap Text: The defect edges were debeveled with a #15 scalpel blade.  Given the size, depth, location of the defect and the defect being full thickness a nasal turnover hinge flap was deemed most appropriate.  Using a sterile surgical marker, an appropriate hinge flap was drawn incorporating the defect. The area thus outlined was incised with a #15 scalpel blade. The flap was designed to recreate the nasal mucosal lining and the alar rim. The skin margins were undermined to an appropriate distance in all directions utilizing iris scissors. Nasalis-Muscle-Based Myocutaneous Island Pedicle Flap Text: Using a #15 blade, an incision was made around the donor flap to the level of the nasalis muscle. Wide lateral undermining was then performed in both the subcutaneous plane above the nasalis muscle, and in a submuscular plane just above periosteum. This allowed the formation of a free nasalis muscle axial pedicle (based on the angular artery) which was still attached to the actual cutaneous flap, increasing its mobility and vascular viability. Hemostasis was obtained with pinpoint electrocoagulation. The flap was mobilized into position and the pivotal anchor points positioned and stabilized with buried interrupted sutures. Subcutaneous and dermal tissues were closed in a multilayered fashion with sutures. Tissue redundancies were excised, and the epidermal edges were apposed without significant tension and sutured with sutures. Nasalis Myocutaneous Flap Text: Using a #15 blade, an incision was made around the donor flap to the level of the nasalis muscle. Wide lateral undermining was then performed in both the subcutaneous plane above the nasalis muscle, and in a submuscular plane just above periosteum. This allowed the formation of a free nasalis muscle axial pedicle which was still attached to the actual cutaneous flap, increasing its mobility and vascular viability. Hemostasis was obtained with pinpoint electrocoagulation. The flap was mobilized into position and the pivotal anchor points positioned and stabilized with buried interrupted sutures. Subcutaneous and dermal tissues were closed in a multilayered fashion with sutures. Tissue redundancies were excised, and the epidermal edges were apposed without significant tension and sutured with sutures. Nasolabial Transposition Flap Text: The defect edges were debeveled with a #15 scalpel blade.  Given the size, depth and location of the defect and the proximity to free margins a nasolabial transposition flap was deemed most appropriate. Using a sterile surgical marker, an appropriate flap was drawn incorporating the defect. The area thus outlined was incised with a #15 scalpel blade. The skin margins were undermined to an appropriate distance in all directions utilizing iris scissors. Following this, the designed flap was carried into the primary defect and sutured into place. Orbicularis Oris Muscle Flap Text: The defect edges were debeveled with a #15 scalpel blade.  Given that the defect affected the competency of the oral sphincter an obicularis oris muscle flap was deemed most appropriate to restore this competency and normal muscle function.  Using a sterile surgical marker, an appropriate flap was drawn incorporating the defect. The area thus outlined was incised with a #15 scalpel blade. Melolabial Transposition Flap Text: The defect edges were debeveled with a #15 scalpel blade.  Given the location of the defect and the proximity to free margins a melolabial flap was deemed most appropriate.  Using a sterile surgical marker, an appropriate melolabial transposition flap was drawn incorporating the defect.    The area thus outlined was incised deep to adipose tissue with a #15 scalpel blade.  The skin margins were undermined to an appropriate distance in all directions utilizing iris scissors. Rectangular Flap Text: The defect edges were debeveled with a #15 scalpel blade. Given the location of the defect and the proximity to free margins a rectangular flap was deemed most appropriate. Using a sterile surgical marker, an appropriate rectangular flap was drawn incorporating the defect. The area thus outlined was incised deep to adipose tissue with a #15 scalpel blade. The skin margins were undermined to an appropriate distance in all directions utilizing iris scissors. Following this, the designed flap was carried over into the primary defect and sutured into place. Rhombic Flap Text: The defect edges were debeveled with a #15 scalpel blade.  Given the location of the defect and the proximity to free margins a rhombic flap was deemed most appropriate.  Using a sterile surgical marker, an appropriate rhombic flap was drawn incorporating the defect.    The area thus outlined was incised deep to adipose tissue with a #15 scalpel blade.  The skin margins were undermined to an appropriate distance in all directions utilizing iris scissors. Rhomboid Transposition Flap Text: The defect edges were debeveled with a #15 scalpel blade.  Given the location of the defect and the proximity to free margins a rhomboid transposition flap was deemed most appropriate.  Using a sterile surgical marker, an appropriate rhomboid flap was drawn incorporating the defect.    The area thus outlined was incised deep to adipose tissue with a #15 scalpel blade.  The skin margins were undermined to an appropriate distance in all directions utilizing iris scissors. Bi-Rhombic Flap Text: The defect edges were debeveled with a #15 scalpel blade.  Given the location of the defect and the proximity to free margins a bi-rhombic flap was deemed most appropriate.  Using a sterile surgical marker, an appropriate rhombic flap was drawn incorporating the defect. The area thus outlined was incised deep to adipose tissue with a #15 scalpel blade.  The skin margins were undermined to an appropriate distance in all directions utilizing iris scissors. Helical Rim Advancement Flap Text: The defect edges were debeveled with a #15 blade scalpel.  Given the location of the defect and the proximity to free margins (helical rim) a double helical rim advancement flap was deemed most appropriate.  Using a sterile surgical marker, the appropriate advancement flaps were drawn incorporating the defect and placing the expected incisions between the helical rim and antihelix where possible.  The area thus outlined was incised through and through with a #15 scalpel blade.  With a skin hook and iris scissors, the flaps were gently and sharply undermined and freed up. Bilateral Helical Rim Advancement Flap Text: The defect edges were debeveled with a #15 blade scalpel.  Given the location of the defect and the proximity to free margins (helical rim) a bilateral helical rim advancement flap was deemed most appropriate.  Using a sterile surgical marker, the appropriate advancement flaps were drawn incorporating the defect and placing the expected incisions between the helical rim and antihelix where possible.  The area thus outlined was incised through and through with a #15 scalpel blade.  With a skin hook and iris scissors, the flaps were gently and sharply undermined and freed up. Ear Star Wedge Flap Text: The defect edges were debeveled with a #15 blade scalpel.  Given the location of the defect and the proximity to free margins (helical rim) an ear star wedge flap was deemed most appropriate.  Using a sterile surgical marker, the appropriate flap was drawn incorporating the defect and placing the expected incisions between the helical rim and antihelix where possible.  The area thus outlined was incised through and through with a #15 scalpel blade. Flip-Flop Flap Text: The defect edges were debeveled with a #15 blade scalpel.  Given the location of the defect and the proximity to free margins a flip-flop flap was deemed most appropriate. Using a sterile surgical marker, the appropriate flap was drawn incorporating the defect and placing the expected incisions between the helical rim and antihelix where possible.  The area thus outlined was incised through and through with a #15 scalpel blade. Following this, the designed flap was carried over into the primary defect and sutured into place. Banner Transposition Flap Text: The defect edges were debeveled with a #15 scalpel blade.  Given the location of the defect and the proximity to free margins a Banner transposition flap was deemed most appropriate.  Using a sterile surgical marker, an appropriate flap drawn around the defect. The area thus outlined was incised deep to adipose tissue with a #15 scalpel blade.  The skin margins were undermined to an appropriate distance in all directions utilizing iris scissors. Bilobed Flap Text: The defect edges were debeveled with a #15 scalpel blade.  Given the location of the defect and the proximity to free margins a bilobe flap was deemed most appropriate.  Using a sterile surgical marker, an appropriate bilobe flap drawn around the defect.    The area thus outlined was incised deep to adipose tissue with a #15 scalpel blade.  The skin margins were undermined to an appropriate distance in all directions utilizing iris scissors. Bilobed Transposition Flap Text: The defect edges were debeveled with a #15 scalpel blade.  Given the location of the defect and the proximity to free margins a bilobed transposition flap was deemed most appropriate.  Using a sterile surgical marker, an appropriate bilobe flap drawn around the defect.    The area thus outlined was incised deep to adipose tissue with a #15 scalpel blade.  The skin margins were undermined to an appropriate distance in all directions utilizing iris scissors. Trilobed Flap Text: The defect edges were debeveled with a #15 scalpel blade.  Given the location of the defect and the proximity to free margins a trilobed flap was deemed most appropriate.  Using a sterile surgical marker, an appropriate trilobed flap drawn around the defect.    The area thus outlined was incised deep to adipose tissue with a #15 scalpel blade.  The skin margins were undermined to an appropriate distance in all directions utilizing iris scissors. Dorsal Nasal Flap Text: The defect edges were debeveled with a #15 scalpel blade.  Given the location of the defect and the proximity to free margins a dorsal nasal flap was deemed most appropriate.  Using a sterile surgical marker, an appropriate dorsal nasal flap was drawn around the defect.    The area thus outlined was incised deep to adipose tissue with a #15 scalpel blade.  The skin margins were undermined to an appropriate distance in all directions utilizing iris scissors. Island Pedicle Flap Text: The defect edges were debeveled with a #15 scalpel blade.  Given the location of the defect, shape of the defect and the proximity to free margins an island pedicle advancement flap was deemed most appropriate.  Using a sterile surgical marker, an appropriate advancement flap was drawn incorporating the defect, outlining the appropriate donor tissue and placing the expected incisions within the relaxed skin tension lines where possible.    The area thus outlined was incised deep to adipose tissue with a #15 scalpel blade.  The skin margins were undermined to an appropriate distance in all directions around the primary defect and laterally outward around the island pedicle utilizing iris scissors.  There was minimal undermining beneath the pedicle flap. Island Pedicle Flap With Canthal Suspension Text: The defect edges were debeveled with a #15 scalpel blade.  Given the location of the defect, shape of the defect and the proximity to free margins an island pedicle advancement flap was deemed most appropriate.  Using a sterile surgical marker, an appropriate advancement flap was drawn incorporating the defect, outlining the appropriate donor tissue and placing the expected incisions within the relaxed skin tension lines where possible. The area thus outlined was incised deep to adipose tissue with a #15 scalpel blade.  The skin margins were undermined to an appropriate distance in all directions around the primary defect and laterally outward around the island pedicle utilizing iris scissors.  There was minimal undermining beneath the pedicle flap. A suspension suture was placed in the canthal tendon to prevent tension and prevent ectropion. Alar Island Pedicle Flap Text: The defect edges were debeveled with a #15 scalpel blade.  Given the location of the defect, shape of the defect and the proximity to the alar rim an island pedicle advancement flap was deemed most appropriate.  Using a sterile surgical marker, an appropriate advancement flap was drawn incorporating the defect, outlining the appropriate donor tissue and placing the expected incisions within the nasal ala running parallel to the alar rim. The area thus outlined was incised with a #15 scalpel blade.  The skin margins were undermined minimally to an appropriate distance in all directions around the primary defect and laterally outward around the island pedicle utilizing iris scissors.  There was minimal undermining beneath the pedicle flap. Double Island Pedicle Flap Text: The defect edges were debeveled with a #15 scalpel blade.  Given the location of the defect, shape of the defect and the proximity to free margins a double island pedicle advancement flap was deemed most appropriate.  Using a sterile surgical marker, an appropriate advancement flap was drawn incorporating the defect, outlining the appropriate donor tissue and placing the expected incisions within the relaxed skin tension lines where possible.    The area thus outlined was incised deep to adipose tissue with a #15 scalpel blade.  The skin margins were undermined to an appropriate distance in all directions around the primary defect and laterally outward around the island pedicle utilizing iris scissors.  There was minimal undermining beneath the pedicle flap. Island Pedicle Flap-Requiring Vessel Identification Text: The defect edges were debeveled with a #15 scalpel blade.  Given the location of the defect, shape of the defect and the proximity to free margins an island pedicle advancement flap was deemed most appropriate.  Using a sterile surgical marker, an appropriate advancement flap was drawn, based on the axial vessel mentioned above, incorporating the defect, outlining the appropriate donor tissue and placing the expected incisions within the relaxed skin tension lines where possible.    The area thus outlined was incised deep to adipose tissue with a #15 scalpel blade.  The skin margins were undermined to an appropriate distance in all directions around the primary defect and laterally outward around the island pedicle utilizing iris scissors.  There was minimal undermining beneath the pedicle flap. Keystone Flap Text: The defect edges were debeveled with a #15 scalpel blade.  Given the location of the defect, shape of the defect a keystone flap was deemed most appropriate.  Using a sterile surgical marker, an appropriate keystone flap was drawn incorporating the defect, outlining the appropriate donor tissue and placing the expected incisions within the relaxed skin tension lines where possible. The area thus outlined was incised deep to adipose tissue with a #15 scalpel blade.  The skin margins were undermined to an appropriate distance in all directions around the primary defect and laterally outward around the flap utilizing iris scissors. O-T Plasty Text: The defect edges were debeveled with a #15 scalpel blade.  Given the location of the defect, shape of the defect and the proximity to free margins an O-T plasty was deemed most appropriate.  Using a sterile surgical marker, an appropriate O-T plasty was drawn incorporating the defect and placing the expected incisions within the relaxed skin tension lines where possible.    The area thus outlined was incised deep to adipose tissue with a #15 scalpel blade.  The skin margins were undermined to an appropriate distance in all directions utilizing iris scissors. O-Z Plasty Text: The defect edges were debeveled with a #15 scalpel blade.  Given the location of the defect, shape of the defect and the proximity to free margins an O-Z plasty (double transposition flap) was deemed most appropriate.  Using a sterile surgical marker, the appropriate transposition flaps were drawn incorporating the defect and placing the expected incisions within the relaxed skin tension lines where possible.    The area thus outlined was incised deep to adipose tissue with a #15 scalpel blade.  The skin margins were undermined to an appropriate distance in all directions utilizing iris scissors.  Hemostasis was achieved with electrocautery.  The flaps were then transposed into place, one clockwise and the other counterclockwise, and anchored with interrupted buried subcutaneous sutures. Double O-Z Plasty Text: The defect edges were debeveled with a #15 scalpel blade.  Given the location of the defect, shape of the defect and the proximity to free margins a Double O-Z plasty (double transposition flap) was deemed most appropriate.  Using a sterile surgical marker, the appropriate transposition flaps were drawn incorporating the defect and placing the expected incisions within the relaxed skin tension lines where possible. The area thus outlined was incised deep to adipose tissue with a #15 scalpel blade.  The skin margins were undermined to an appropriate distance in all directions utilizing iris scissors.  Hemostasis was achieved with electrocautery.  The flaps were then transposed into place, one clockwise and the other counterclockwise, and anchored with interrupted buried subcutaneous sutures. V-Y Plasty Text: The defect edges were debeveled with a #15 scalpel blade.  Given the location of the defect, shape of the defect and the proximity to free margins an V-Y advancement flap was deemed most appropriate.  Using a sterile surgical marker, an appropriate advancement flap was drawn incorporating the defect and placing the expected incisions within the relaxed skin tension lines where possible.    The area thus outlined was incised deep to adipose tissue with a #15 scalpel blade.  The skin margins were undermined to an appropriate distance in all directions utilizing iris scissors. H Plasty Text: Given the location of the defect, shape of the defect and the proximity to free margins a H-plasty was deemed most appropriate for repair.  Using a sterile surgical marker, the appropriate advancement arms of the H-plasty were drawn incorporating the defect and placing the expected incisions within the relaxed skin tension lines where possible. The area thus outlined was incised deep to adipose tissue with a #15 scalpel blade. The skin margins were undermined to an appropriate distance in all directions utilizing iris scissors.  The opposing advancement arms were then advanced into place in opposite direction and anchored with interrupted buried subcutaneous sutures. W Plasty Text: The lesion was extirpated to the level of the fat with a #15 scalpel blade.  Given the location of the defect, shape of the defect and the proximity to free margins a W-plasty was deemed most appropriate for repair.  Using a sterile surgical marker, the appropriate transposition arms of the W-plasty were drawn incorporating the defect and placing the expected incisions within the relaxed skin tension lines where possible.    The area thus outlined was incised deep to adipose tissue with a #15 scalpel blade.  The skin margins were undermined to an appropriate distance in all directions utilizing iris scissors.  The opposing transposition arms were then transposed into place in opposite direction and anchored with interrupted buried subcutaneous sutures. Z Plasty Text: The lesion was extirpated to the level of the fat with a #15 scalpel blade.  Given the location of the defect, shape of the defect and the proximity to free margins a Z-plasty was deemed most appropriate for repair.  Using a sterile surgical marker, the appropriate transposition arms of the Z-plasty were drawn incorporating the defect and placing the expected incisions within the relaxed skin tension lines where possible.    The area thus outlined was incised deep to adipose tissue with a #15 scalpel blade.  The skin margins were undermined to an appropriate distance in all directions utilizing iris scissors.  The opposing transposition arms were then transposed into place in opposite direction and anchored with interrupted buried subcutaneous sutures. Double Z Plasty Text: The lesion was extirpated to the level of the fat with a #15 scalpel blade. Given the location of the defect, shape of the defect and the proximity to free margins a double Z-plasty was deemed most appropriate for repair. Using a sterile surgical marker, the appropriate transposition arms of the double Z-plasty were drawn incorporating the defect and placing the expected incisions within the relaxed skin tension lines where possible. The area thus outlined was incised deep to adipose tissue with a #15 scalpel blade. The skin margins were undermined to an appropriate distance in all directions utilizing iris scissors. The opposing transposition arms were then transposed and carried over into place in opposite direction and anchored with interrupted buried subcutaneous sutures. Zygomaticofacial Flap Text: Given the location of the defect, shape of the defect and the proximity to free margins a zygomaticofacial flap was deemed most appropriate for repair.  Using a sterile surgical marker, the appropriate flap was drawn incorporating the defect and placing the expected incisions within the relaxed skin tension lines where possible. The area thus outlined was incised deep to adipose tissue with a #15 scalpel blade with preservation of a vascular pedicle.  The skin margins were undermined to an appropriate distance in all directions utilizing iris scissors.  The flap was then placed into the defect and anchored with interrupted buried subcutaneous sutures. Cheek Interpolation Flap Text: A decision was made to reconstruct the defect utilizing an interpolation axial flap and a staged reconstruction.  A telfa template was made of the defect.  This telfa template was then used to outline the Cheek Interpolation flap.  The donor area for the pedicle flap was then injected with anesthesia.  The flap was excised through the skin and subcutaneous tissue down to the layer of the underlying musculature.  The interpolation flap was carefully excised within this deep plane to maintain its blood supply.  The edges of the donor site were undermined.   The donor site was closed in a primary fashion.  The pedicle was then rotated into position and sutured.  Once the tube was sutured into place, adequate blood supply was confirmed with blanching and refill.  The pedicle was then wrapped with xeroform gauze and dressed appropriately with a telfa and gauze bandage to ensure continued blood supply and protect the attached pedicle. Cheek-To-Nose Interpolation Flap Text: A decision was made to reconstruct the defect utilizing an interpolation axial flap and a staged reconstruction.  A telfa template was made of the defect.  This telfa template was then used to outline the Cheek-To-Nose Interpolation flap.  The donor area for the pedicle flap was then injected with anesthesia.  The flap was excised through the skin and subcutaneous tissue down to the layer of the underlying musculature.  The interpolation flap was carefully excised within this deep plane to maintain its blood supply.  The edges of the donor site were undermined.   The donor site was closed in a primary fashion.  The pedicle was then rotated into position and sutured.  Once the tube was sutured into place, adequate blood supply was confirmed with blanching and refill.  The pedicle was then wrapped with xeroform gauze and dressed appropriately with a telfa and gauze bandage to ensure continued blood supply and protect the attached pedicle. Interpolation Flap Text: A decision was made to reconstruct the defect utilizing an interpolation axial flap and a staged reconstruction.  A telfa template was made of the defect.  This telfa template was then used to outline the interpolation flap.  The donor area for the pedicle flap was then injected with anesthesia.  The flap was excised through the skin and subcutaneous tissue down to the layer of the underlying musculature.  The interpolation flap was carefully excised within this deep plane to maintain its blood supply.  The edges of the donor site were undermined.   The donor site was closed in a primary fashion.  The pedicle was then rotated into position and sutured.  Once the tube was sutured into place, adequate blood supply was confirmed with blanching and refill.  The pedicle was then wrapped with xeroform gauze and dressed appropriately with a telfa and gauze bandage to ensure continued blood supply and protect the attached pedicle. Melolabial Interpolation Flap Text: A decision was made to reconstruct the defect utilizing an interpolation axial flap and a staged reconstruction.  A telfa template was made of the defect.  This telfa template was then used to outline the melolabial interpolation flap.  The donor area for the pedicle flap was then injected with anesthesia.  The flap was excised through the skin and subcutaneous tissue down to the layer of the underlying musculature.  The pedicle flap was carefully excised within this deep plane to maintain its blood supply.  The edges of the donor site were undermined.   The donor site was closed in a primary fashion.  The pedicle was then rotated into position and sutured.  Once the tube was sutured into place, adequate blood supply was confirmed with blanching and refill.  The pedicle was then wrapped with xeroform gauze and dressed appropriately with a telfa and gauze bandage to ensure continued blood supply and protect the attached pedicle. Mastoid Interpolation Flap Text: A decision was made to reconstruct the defect utilizing an interpolation axial flap and a staged reconstruction.  A telfa template was made of the defect.  This telfa template was then used to outline the mastoid interpolation flap.  The donor area for the pedicle flap was then injected with anesthesia.  The flap was excised through the skin and subcutaneous tissue down to the layer of the underlying musculature.  The pedicle flap was carefully excised within this deep plane to maintain its blood supply.  The edges of the donor site were undermined.   The donor site was closed in a primary fashion.  The pedicle was then rotated into position and sutured.  Once the tube was sutured into place, adequate blood supply was confirmed with blanching and refill.  The pedicle was then wrapped with xeroform gauze and dressed appropriately with a telfa and gauze bandage to ensure continued blood supply and protect the attached pedicle. Posterior Auricular Interpolation Flap Text: A decision was made to reconstruct the defect utilizing an interpolation axial flap and a staged reconstruction.  A telfa template was made of the defect.  This telfa template was then used to outline the posterior auricular interpolation flap.  The donor area for the pedicle flap was then injected with anesthesia.  The flap was excised through the skin and subcutaneous tissue down to the layer of the underlying musculature.  The pedicle flap was carefully excised within this deep plane to maintain its blood supply.  The edges of the donor site were undermined.   The donor site was closed in a primary fashion.  The pedicle was then rotated into position and sutured.  Once the tube was sutured into place, adequate blood supply was confirmed with blanching and refill.  The pedicle was then wrapped with xeroform gauze and dressed appropriately with a telfa and gauze bandage to ensure continued blood supply and protect the attached pedicle. Paramedian Forehead Flap Text: A decision was made to reconstruct the defect utilizing an interpolation axial flap and a staged reconstruction.  A telfa template was made of the defect.  This telfa template was then used to outline the paramedian forehead pedicle flap.  The donor area for the pedicle flap was then injected with anesthesia.  The flap was excised through the skin and subcutaneous tissue down to the layer of the underlying musculature.  The pedicle flap was carefully excised within this deep plane to maintain its blood supply.  The edges of the donor site were undermined.   The donor site was closed in a primary fashion.  The pedicle was then rotated into position and sutured.  Once the tube was sutured into place, adequate blood supply was confirmed with blanching and refill.  The pedicle was then wrapped with xeroform gauze and dressed appropriately with a telfa and gauze bandage to ensure continued blood supply and protect the attached pedicle. Abbe Flap (Upper To Lower Lip) Text: The defect of the lower lip was assessed and measured.  Given the location and size of the defect, an Abbe flap was deemed most appropriate. Using a sterile surgical marker, an appropriate Abbe flap was measured and drawn on the upper lip. Local anesthesia was then infiltrated.  A scalpel was then used to incise the upper lip through and through the skin, vermilion, muscle and mucosa, leaving the flap pedicled on the opposite side.  The flap was then rotated and transferred to the lower lip defect.  The flap was then sutured into place with a three layer technique, closing the orbicularis oris muscle layer with subcutaneous buried sutures, followed by a mucosal layer and an epidermal layer. Abbe Flap (Lower To Upper Lip) Text: The defect of the upper lip was assessed and measured.  Given the location and size of the defect, an Abbe flap was deemed most appropriate. Using a sterile surgical marker, an appropriate Abbe flap was measured and drawn on the lower lip. Local anesthesia was then infiltrated. A scalpel was then used to incise the upper lip through and through the skin, vermilion, muscle and mucosa, leaving the flap pedicled on the opposite side.  The flap was then rotated and transferred to the lower lip defect.  The flap was then sutured into place with a three layer technique, closing the orbicularis oris muscle layer with subcutaneous buried sutures, followed by a mucosal layer and an epidermal layer. Estlander Flap (Upper To Lower Lip) Text: The defect of the lower lip was assessed and measured.  Given the location and size of the defect, an Estlander flap was deemed most appropriate. Using a sterile surgical marker, an appropriate Estlander flap was measured and drawn on the upper lip. Local anesthesia was then infiltrated. A scalpel was then used to incise the lateral aspect of the flap, through skin, muscle and mucosa, leaving the flap pedicled medially.  The flap was then rotated and positioned to fill the lower lip defect.  The flap was then sutured into place with a three layer technique, closing the orbicularis oris muscle layer with subcutaneous buried sutures, followed by a mucosal layer and an epidermal layer. Lip Wedge Excision Repair Text: Given the location of the defect and the proximity to free margins a full thickness wedge repair was deemed most appropriate.  Using a sterile surgical marker, the appropriate repair was drawn incorporating the defect and placing the expected incisions perpendicular to the vermilion border.  The vermilion border was also meticulously outlined to ensure appropriate reapproximation during the repair.  The area thus outlined was incised through and through with a #15 scalpel blade.  The muscularis and dermis were reaproximated with deep sutures following hemostasis. Care was taken to realign the vermilion border before proceeding with the superficial closure.  Once the vermilion was realigned the superfical and mucosal closure was finished. Ftsg Text: The defect edges were debeveled with a #15 scalpel blade.  Given the location of the defect, shape of the defect and the proximity to free margins a full thickness skin graft was deemed most appropriate.  Using a sterile surgical marker, the primary defect shape was transferred to the donor site. The area thus outlined was incised deep to adipose tissue with a #15 scalpel blade.  The harvested graft was then trimmed of adipose tissue until only dermis and epidermis was left.  The skin margins of the secondary defect were undermined to an appropriate distance in all directions utilizing iris scissors.  The secondary defect was closed with interrupted buried subcutaneous sutures.  The skin edges were then re-apposed with running  sutures.  The skin graft was then placed in the primary defect and oriented appropriately. Split-Thickness Skin Graft Text: The defect edges were debeveled with a #15 scalpel blade.  Given the location of the defect, shape of the defect and the proximity to free margins a split thickness skin graft was deemed most appropriate.  Using a sterile surgical marker, the primary defect shape was transferred to the donor site. The split thickness graft was then harvested.  The skin graft was then placed in the primary defect and oriented appropriately. Pinch Graft Text: The defect edges were debeveled with a #15 scalpel blade. Given the location of the defect, shape of the defect and the proximity to free margins a pinch graft was deemed most appropriate. Using a sterile surgical marker, the primary defect shape was transferred to the donor site. The area thus outlined was incised deep to adipose tissue with a #15 scalpel blade.  The harvested graft was then trimmed of adipose tissue until only dermis and epidermis was left. The skin margins of the secondary defect were undermined to an appropriate distance in all directions utilizing iris scissors.  The secondary defect was closed with interrupted buried subcutaneous sutures.  The skin edges were then re-apposed with running  sutures.  The skin graft was then placed in the primary defect and oriented appropriately. Burow's Graft Text: The defect edges were debeveled with a #15 scalpel blade.  Given the location of the defect, shape of the defect, the proximity to free margins and the presence of a standing cone deformity a Burow's skin graft was deemed most appropriate. The standing cone was removed and this tissue was then trimmed to the shape of the primary defect. The adipose tissue was also removed until only dermis and epidermis were left.  The skin margins of the secondary defect were undermined to an appropriate distance in all directions utilizing iris scissors.  The secondary defect was closed with interrupted buried subcutaneous sutures.  The skin edges were then re-apposed with running  sutures.  The skin graft was then placed in the primary defect and oriented appropriately. Cartilage Graft Text: The defect edges were debeveled with a #15 scalpel blade.  Given the location of the defect, shape of the defect, the fact the defect involved a full thickness cartilage defect a cartilage graft was deemed most appropriate.  An appropriate donor site was identified, cleansed, and anesthetized. The cartilage graft was then harvested and transferred to the recipient site, oriented appropriately and then sutured into place.  The secondary defect was then repaired using a primary closure. Composite Graft Text: The defect edges were debeveled with a #15 scalpel blade.  Given the location of the defect, shape of the defect, the proximity to free margins and the fact the defect was full thickness a composite graft was deemed most appropriate.  The defect was outline and then transferred to the donor site.  A full thickness graft was then excised from the donor site. The graft was then placed in the primary defect, oriented appropriately and then sutured into place.  The secondary defect was then repaired using a primary closure. Epidermal Autograft Text: The defect edges were debeveled with a #15 scalpel blade.  Given the location of the defect, shape of the defect and the proximity to free margins an epidermal autograft was deemed most appropriate.  Using a sterile surgical marker, the primary defect shape was transferred to the donor site. The epidermal graft was then harvested.  The skin graft was then placed in the primary defect and oriented appropriately. Dermal Autograft Text: The defect edges were debeveled with a #15 scalpel blade.  Given the location of the defect, shape of the defect and the proximity to free margins a dermal autograft was deemed most appropriate.  Using a sterile surgical marker, the primary defect shape was transferred to the donor site. The area thus outlined was incised deep to adipose tissue with a #15 scalpel blade.  The harvested graft was then trimmed of adipose and epidermal tissue until only dermis was left.  The skin graft was then placed in the primary defect and oriented appropriately. Skin Substitute Text: The defect edges were debeveled with a #15 scalpel blade.  Given the location of the defect, shape of the defect and the proximity to free margins a skin substitute graft was deemed most appropriate.  The graft material was trimmed to fit the size of the defect. The graft was then placed in the primary defect and oriented appropriately. Tissue Cultured Epidermal Autograft Text: The defect edges were debeveled with a #15 scalpel blade.  Given the location of the defect, shape of the defect and the proximity to free margins a tissue cultured epidermal autograft was deemed most appropriate.  The graft was then trimmed to fit the size of the defect.  The graft was then placed in the primary defect and oriented appropriately. Xenograft Text: The defect edges were debeveled with a #15 scalpel blade.  Given the location of the defect, shape of the defect and the proximity to free margins a xenograft was deemed most appropriate.  The graft was then trimmed to fit the size of the defect.  The graft was then placed in the primary defect and oriented appropriately. Purse String (Intermediate) Text: Given the location of the defect and the characteristics of the surrounding skin a purse string intermediate closure was deemed most appropriate.  Undermining was performed circumferentially around the surgical defect.  A purse string suture was then placed and tightened. Purse String (Simple) Text: Given the location of the defect and the characteristics of the surrounding skin a purse string simple closure was deemed most appropriate.  Undermining was performed circumferentially around the surgical defect.  A purse string suture was then placed and tightened. Complex Repair And Single Advancement Flap Text: The defect edges were debeveled with a #15 scalpel blade.  The primary defect was closed partially with a complex linear closure.  Given the location of the remaining defect, shape of the defect and the proximity to free margins a single advancement flap was deemed most appropriate for complete closure of the defect.  Using a sterile surgical marker, an appropriate advancement flap was drawn incorporating the defect and placing the expected incisions within the relaxed skin tension lines where possible.    The area thus outlined was incised deep to adipose tissue with a #15 scalpel blade.  The skin margins were undermined to an appropriate distance in all directions utilizing iris scissors. Complex Repair And Double Advancement Flap Text: The defect edges were debeveled with a #15 scalpel blade.  The primary defect was closed partially with a complex linear closure.  Given the location of the remaining defect, shape of the defect and the proximity to free margins a double advancement flap was deemed most appropriate for complete closure of the defect.  Using a sterile surgical marker, an appropriate advancement flap was drawn incorporating the defect and placing the expected incisions within the relaxed skin tension lines where possible.    The area thus outlined was incised deep to adipose tissue with a #15 scalpel blade.  The skin margins were undermined to an appropriate distance in all directions utilizing iris scissors. Complex Repair And Modified Advancement Flap Text: The defect edges were debeveled with a #15 scalpel blade.  The primary defect was closed partially with a complex linear closure.  Given the location of the remaining defect, shape of the defect and the proximity to free margins a modified advancement flap was deemed most appropriate for complete closure of the defect.  Using a sterile surgical marker, an appropriate advancement flap was drawn incorporating the defect and placing the expected incisions within the relaxed skin tension lines where possible.    The area thus outlined was incised deep to adipose tissue with a #15 scalpel blade.  The skin margins were undermined to an appropriate distance in all directions utilizing iris scissors. Complex Repair And A-T Advancement Flap Text: The defect edges were debeveled with a #15 scalpel blade.  The primary defect was closed partially with a complex linear closure.  Given the location of the remaining defect, shape of the defect and the proximity to free margins an A-T advancement flap was deemed most appropriate for complete closure of the defect.  Using a sterile surgical marker, an appropriate advancement flap was drawn incorporating the defect and placing the expected incisions within the relaxed skin tension lines where possible.    The area thus outlined was incised deep to adipose tissue with a #15 scalpel blade.  The skin margins were undermined to an appropriate distance in all directions utilizing iris scissors. Complex Repair And O-T Advancement Flap Text: The defect edges were debeveled with a #15 scalpel blade.  The primary defect was closed partially with a complex linear closure.  Given the location of the remaining defect, shape of the defect and the proximity to free margins an O-T advancement flap was deemed most appropriate for complete closure of the defect.  Using a sterile surgical marker, an appropriate advancement flap was drawn incorporating the defect and placing the expected incisions within the relaxed skin tension lines where possible.    The area thus outlined was incised deep to adipose tissue with a #15 scalpel blade.  The skin margins were undermined to an appropriate distance in all directions utilizing iris scissors. Complex Repair And O-L Flap Text: The defect edges were debeveled with a #15 scalpel blade.  The primary defect was closed partially with a complex linear closure.  Given the location of the remaining defect, shape of the defect and the proximity to free margins an O-L flap was deemed most appropriate for complete closure of the defect.  Using a sterile surgical marker, an appropriate flap was drawn incorporating the defect and placing the expected incisions within the relaxed skin tension lines where possible.    The area thus outlined was incised deep to adipose tissue with a #15 scalpel blade.  The skin margins were undermined to an appropriate distance in all directions utilizing iris scissors. Complex Repair And Bilobe Flap Text: The defect edges were debeveled with a #15 scalpel blade.  The primary defect was closed partially with a complex linear closure.  Given the location of the remaining defect, shape of the defect and the proximity to free margins a bilobe flap was deemed most appropriate for complete closure of the defect.  Using a sterile surgical marker, an appropriate advancement flap was drawn incorporating the defect and placing the expected incisions within the relaxed skin tension lines where possible.    The area thus outlined was incised deep to adipose tissue with a #15 scalpel blade.  The skin margins were undermined to an appropriate distance in all directions utilizing iris scissors. Complex Repair And Melolabial Flap Text: The defect edges were debeveled with a #15 scalpel blade.  The primary defect was closed partially with a complex linear closure.  Given the location of the remaining defect, shape of the defect and the proximity to free margins a melolabial flap was deemed most appropriate for complete closure of the defect.  Using a sterile surgical marker, an appropriate advancement flap was drawn incorporating the defect and placing the expected incisions within the relaxed skin tension lines where possible.    The area thus outlined was incised deep to adipose tissue with a #15 scalpel blade.  The skin margins were undermined to an appropriate distance in all directions utilizing iris scissors. Complex Repair And Rotation Flap Text: The defect edges were debeveled with a #15 scalpel blade.  The primary defect was closed partially with a complex linear closure.  Given the location of the remaining defect, shape of the defect and the proximity to free margins a rotation flap was deemed most appropriate for complete closure of the defect.  Using a sterile surgical marker, an appropriate advancement flap was drawn incorporating the defect and placing the expected incisions within the relaxed skin tension lines where possible.    The area thus outlined was incised deep to adipose tissue with a #15 scalpel blade.  The skin margins were undermined to an appropriate distance in all directions utilizing iris scissors. Complex Repair And Rhombic Flap Text: The defect edges were debeveled with a #15 scalpel blade.  The primary defect was closed partially with a complex linear closure.  Given the location of the remaining defect, shape of the defect and the proximity to free margins a rhombic flap was deemed most appropriate for complete closure of the defect.  Using a sterile surgical marker, an appropriate advancement flap was drawn incorporating the defect and placing the expected incisions within the relaxed skin tension lines where possible.    The area thus outlined was incised deep to adipose tissue with a #15 scalpel blade.  The skin margins were undermined to an appropriate distance in all directions utilizing iris scissors. Complex Repair And Transposition Flap Text: The defect edges were debeveled with a #15 scalpel blade.  The primary defect was closed partially with a complex linear closure.  Given the location of the remaining defect, shape of the defect and the proximity to free margins a transposition flap was deemed most appropriate for complete closure of the defect.  Using a sterile surgical marker, an appropriate advancement flap was drawn incorporating the defect and placing the expected incisions within the relaxed skin tension lines where possible.    The area thus outlined was incised deep to adipose tissue with a #15 scalpel blade.  The skin margins were undermined to an appropriate distance in all directions utilizing iris scissors. Complex Repair And V-Y Plasty Text: The defect edges were debeveled with a #15 scalpel blade.  The primary defect was closed partially with a complex linear closure.  Given the location of the remaining defect, shape of the defect and the proximity to free margins a V-Y plasty was deemed most appropriate for complete closure of the defect.  Using a sterile surgical marker, an appropriate advancement flap was drawn incorporating the defect and placing the expected incisions within the relaxed skin tension lines where possible.    The area thus outlined was incised deep to adipose tissue with a #15 scalpel blade.  The skin margins were undermined to an appropriate distance in all directions utilizing iris scissors. Complex Repair And M Plasty Text: The defect edges were debeveled with a #15 scalpel blade.  The primary defect was closed partially with a complex linear closure.  Given the location of the remaining defect, shape of the defect and the proximity to free margins an M plasty was deemed most appropriate for complete closure of the defect.  Using a sterile surgical marker, an appropriate advancement flap was drawn incorporating the defect and placing the expected incisions within the relaxed skin tension lines where possible.    The area thus outlined was incised deep to adipose tissue with a #15 scalpel blade.  The skin margins were undermined to an appropriate distance in all directions utilizing iris scissors. Complex Repair And Double M Plasty Text: The defect edges were debeveled with a #15 scalpel blade.  The primary defect was closed partially with a complex linear closure.  Given the location of the remaining defect, shape of the defect and the proximity to free margins a double M plasty was deemed most appropriate for complete closure of the defect.  Using a sterile surgical marker, an appropriate advancement flap was drawn incorporating the defect and placing the expected incisions within the relaxed skin tension lines where possible.    The area thus outlined was incised deep to adipose tissue with a #15 scalpel blade.  The skin margins were undermined to an appropriate distance in all directions utilizing iris scissors. Complex Repair And W Plasty Text: The defect edges were debeveled with a #15 scalpel blade.  The primary defect was closed partially with a complex linear closure.  Given the location of the remaining defect, shape of the defect and the proximity to free margins a W plasty was deemed most appropriate for complete closure of the defect.  Using a sterile surgical marker, an appropriate advancement flap was drawn incorporating the defect and placing the expected incisions within the relaxed skin tension lines where possible.    The area thus outlined was incised deep to adipose tissue with a #15 scalpel blade.  The skin margins were undermined to an appropriate distance in all directions utilizing iris scissors. Complex Repair And Z Plasty Text: The defect edges were debeveled with a #15 scalpel blade.  The primary defect was closed partially with a complex linear closure.  Given the location of the remaining defect, shape of the defect and the proximity to free margins a Z plasty was deemed most appropriate for complete closure of the defect.  Using a sterile surgical marker, an appropriate advancement flap was drawn incorporating the defect and placing the expected incisions within the relaxed skin tension lines where possible.    The area thus outlined was incised deep to adipose tissue with a #15 scalpel blade.  The skin margins were undermined to an appropriate distance in all directions utilizing iris scissors. Complex Repair And Dorsal Nasal Flap Text: The defect edges were debeveled with a #15 scalpel blade.  The primary defect was closed partially with a complex linear closure.  Given the location of the remaining defect, shape of the defect and the proximity to free margins a dorsal nasal flap was deemed most appropriate for complete closure of the defect.  Using a sterile surgical marker, an appropriate flap was drawn incorporating the defect and placing the expected incisions within the relaxed skin tension lines where possible.    The area thus outlined was incised deep to adipose tissue with a #15 scalpel blade.  The skin margins were undermined to an appropriate distance in all directions utilizing iris scissors. Complex Repair And Ftsg Text: The defect edges were debeveled with a #15 scalpel blade.  The primary defect was closed partially with a complex linear closure.  Given the location of the defect, shape of the defect and the proximity to free margins a full thickness skin graft was deemed most appropriate to repair the remaining defect.  The graft was trimmed to fit the size of the remaining defect.  The graft was then placed in the primary defect, oriented appropriately, and sutured into place. Complex Repair And Burow's Graft Text: The defect edges were debeveled with a #15 scalpel blade.  The primary defect was closed partially with a complex linear closure.  Given the location of the defect, shape of the defect, the proximity to free margins and the presence of a standing cone deformity a Burow's graft was deemed most appropriate to repair the remaining defect.  The graft was trimmed to fit the size of the remaining defect.  The graft was then placed in the primary defect, oriented appropriately, and sutured into place. Complex Repair And Split-Thickness Skin Graft Text: The defect edges were debeveled with a #15 scalpel blade.  The primary defect was closed partially with a complex linear closure.  Given the location of the defect, shape of the defect and the proximity to free margins a split thickness skin graft was deemed most appropriate to repair the remaining defect.  The graft was trimmed to fit the size of the remaining defect.  The graft was then placed in the primary defect, oriented appropriately, and sutured into place. Complex Repair And Epidermal Autograft Text: The defect edges were debeveled with a #15 scalpel blade.  The primary defect was closed partially with a complex linear closure.  Given the location of the defect, shape of the defect and the proximity to free margins an epidermal autograft was deemed most appropriate to repair the remaining defect.  The graft was trimmed to fit the size of the remaining defect.  The graft was then placed in the primary defect, oriented appropriately, and sutured into place. Complex Repair And Dermal Autograft Text: The defect edges were debeveled with a #15 scalpel blade.  The primary defect was closed partially with a complex linear closure.  Given the location of the defect, shape of the defect and the proximity to free margins an dermal autograft was deemed most appropriate to repair the remaining defect.  The graft was trimmed to fit the size of the remaining defect.  The graft was then placed in the primary defect, oriented appropriately, and sutured into place. Complex Repair And Tissue Cultured Epidermal Autograft Text: The defect edges were debeveled with a #15 scalpel blade.  The primary defect was closed partially with a complex linear closure.  Given the location of the defect, shape of the defect and the proximity to free margins an tissue cultured epidermal autograft was deemed most appropriate to repair the remaining defect.  The graft was trimmed to fit the size of the remaining defect.  The graft was then placed in the primary defect, oriented appropriately, and sutured into place. Complex Repair And Xenograft Text: The defect edges were debeveled with a #15 scalpel blade.  The primary defect was closed partially with a complex linear closure.  Given the location of the defect, shape of the defect and the proximity to free margins a xenograft was deemed most appropriate to repair the remaining defect.  The graft was trimmed to fit the size of the remaining defect.  The graft was then placed in the primary defect, oriented appropriately, and sutured into place. Complex Repair And Skin Substitute Graft Text: The defect edges were debeveled with a #15 scalpel blade.  The primary defect was closed partially with a complex linear closure.  Given the location of the remaining defect, shape of the defect and the proximity to free margins a skin substitute graft was deemed most appropriate to repair the remaining defect.  The graft was trimmed to fit the size of the remaining defect.  The graft was then placed in the primary defect, oriented appropriately, and sutured into place. Path Notes (To The Dermatopathologist): Please check margins. No previous biopsy to site Consent was obtained from the patient. The risks and benefits to therapy were discussed in detail. Specifically, the risks of infection, scarring, bleeding, prolonged wound healing, incomplete removal, allergy to anesthesia, nerve injury and recurrence were addressed. Prior to the procedure, the treatment site was clearly identified and confirmed by the patient. All components of Universal Protocol/PAUSE Rule completed. Render Post-Care Instructions In Note?: yes Post-Care Instructions: I reviewed with the patient in detail post-care instructions. Patient is not to engage in any heavy lifting, exercise, or swimming for the next 14 days. Should the patient develop any fevers, chills, bleeding, severe pain patient will contact the office immediately. Home Suture Removal Text: Patient was provided a home suture removal kit and will remove their sutures at home.  If they have any questions or difficulties they will call the office. Where Do You Want The Question To Include Opioid Counseling Located?: Case Summary Tab Billing Type: Third-Party Bill Information: Selecting Yes will display possible errors in your note based on the variables you have selected. This validation is only offered as a suggestion for you. PLEASE NOTE THAT THE VALIDATION TEXT WILL BE REMOVED WHEN YOU FINALIZE YOUR NOTE. IF YOU WANT TO FAX A PRELIMINARY NOTE YOU WILL NEED TO TOGGLE THIS TO 'NO' IF YOU DO NOT WANT IT IN YOUR FAXED NOTE.

## 2025-01-13 NOTE — PROGRESS NOTES
Asael Catalan M.D.,[facility]  Jese Luna D.O., RD., [facility]  Sarah Storm M.D.  Gayathri Bray M.D.   Howard Gu D.O.  Jaya Heredia M.D.       Patient:  Alyson Olivier 64 y.o. female MRN: [id number]           PULMONARY progress note    We are following patient for bilateral pulmonary embolism and acute shortness of breath    Chief Complaint: Shortness of breath    CODE STATUS: Full    SUBJECTIVE:    1/11/25: Patient seen and examined she is sitting on the sofa in her room.  She is walking around.  Informs me today that she walked around in the hallway while wearing a mask did not have any shortness of breath no cough no hemoptysis.  Continues to be on a heparin drip.    HPI:  Alyson Olivier is a 64 y.o. female recently diagnosed with COVID, 12/28/2024, treated with Paxlovid and Decadron, however she stopped after 3 doses due to symptoms of flushing and abdominal discomfort.  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    Presented to the ER 1/9/2025 after being advised in the outpatient setting ([facility]) she was diagnosed with PE.  Additional records find BLE ultrasound positive for nonocclusive DVT in the left posterior tibial vein.  Denies any lower extremity swelling.  Complaints include  SOB, dizziness, lightheadedness, back pain heartburn unrelieved by medication, several near falls with 1 episode of striking her head (CT of head negative).    Was seen at the bedside today.  Confirmed the above events.  By the end of our visit she reports following Dr. Renteria however has not seen him for well over 4 years.  On room air with saturation of 96%.  No recent trips, surgeries, or sedentary lifestyle.  Denies any cough or bloody sputum.     proBNP 299, 
    [facility]                                Progress note    Subjective:  Complains of pain with deep inspiration to chest and back, requesting more than Tylenol  States that her PENN is improving and she has been ambulating in the hallway without issues.     Objective:  Sitting up on couch, conversing without difficulty  No acute distress  Family present at the bedside, all questions answered   /79   Pulse 65   Temp 97.1 °F (36.2 °C) (Temporal)   Resp 18   Wt 72.1 kg (159 lb)   SpO2 100%   BMI 26.46 kg/m²   CONST:  Well developed, well nourished elderly  female who appears stated age. Awake, alert, cooperative, no apparent distress  HEENT:   Head- Normocephalic, atraumatic   Eyes- Conjunctivae pink, anicteric  Throat- Oral mucosa pink and moist  Neck-  No stridor, trachea midline, no jugular venous distention. No adenopathy   CHEST: Chest symmetrical and non-tender to palpation. No accessory muscle use or intercostal retractions  RESPIRATORY: Lung sounds - clear throughout fields   CARDIOVASCULAR:     No carotid bruit  Heart Inspection- shows no noted pulsations  Heart Palpation- no heaves or thrills; PMI is non-displaced   Heart Ausculation- Regular rate and rhythm, no murmur. No s3, s4 or rub   PV: No lower extremity edema. No varicosities. Pedal pulses palpable, no clubbing or cyanosis   ABDOMEN: Soft, non-tender to light palpation. Bowel sounds present. No palpable masses no organomegaly; no abdominal bruit  MS: Good muscle strength and tone. No atrophy or abnormal movements.   : Deferred  SKIN: Warm and dry no statis dermatitis or ulcers   NEURO / PSYCH: Oriented to person, place and time. Speech clear and appropriate. Follows all commands. Pleasant affect      Recent Labs     01/09/25  1426 01/09/25  2148 01/10/25  0549   WBC 9.3 6.6 6.0   HGB 11.8 11.6 10.9*   HCT 36.9 36.3 33.8*    248 253       Recent Labs     01/09/25  1426 01/10/25  0549 01/10/25  2208   NA 
    [facility]                                Progress note    Subjective:  States that breathing is much improved  States that chest and back pain is much improved with Ultram    Objective:  Ambulating in her room, conversing without difficulty  No acute distress  No family present at the bedside  Echo tech at the bedside  /73   Pulse 53   Temp 97.6 °F (36.4 °C) (Oral)   Resp 20   Wt 72.1 kg (159 lb)   SpO2 97%   BMI 26.46 kg/m²   CONST:  Well developed, well nourished elderly  female who appears stated age. Awake, alert, cooperative, no apparent distress  HEENT:   Head- Normocephalic, atraumatic   Eyes- Conjunctivae pink, anicteric  Throat- Oral mucosa pink and moist  Neck-  No stridor, trachea midline, no jugular venous distention. No adenopathy   CHEST: Chest symmetrical and non-tender to palpation. No accessory muscle use or intercostal retractions  RESPIRATORY: Lung sounds - clear throughout fields   CARDIOVASCULAR:     No carotid bruit  Heart Inspection- shows no noted pulsations  Heart Palpation- no heaves or thrills; PMI is non-displaced   Heart Ausculation- Regular rate and rhythm, no murmur. No s3, s4 or rub   PV: No lower extremity edema. No varicosities. Pedal pulses palpable, no clubbing or cyanosis   ABDOMEN: Soft, non-tender to light palpation. Bowel sounds present. No palpable masses no organomegaly; no abdominal bruit  MS: Good muscle strength and tone. No atrophy or abnormal movements.   : Deferred  SKIN: Warm and dry no statis dermatitis or ulcers   NEURO / PSYCH: Oriented to person, place and time. Speech clear and appropriate. Follows all commands. Pleasant affect      Recent Labs     01/10/25  0549 01/11/25  1030 01/12/25  0500   WBC 6.0 8.6 5.7   HGB 10.9* 12.3 10.4*   HCT 33.8* 39.1 32.6*    294 247       Recent Labs     01/10/25  0549 01/10/25  2208 01/11/25  1030 01/12/25  0500     --  138 138   K 3.9 4.0 4.2 3.8     --  104 106 
4 Eyes Skin Assessment     NAME:  Alyson Olivier  YOB: 1960  MEDICAL RECORD NUMBER:  [id number]    The patient is being assessed for  Admission    I agree that at least one RN has performed a thorough Head to Toe Skin Assessment on the patient. ALL assessment sites listed below have been assessed.      Areas assessed by both nurses:    Head, Face, Ears, Shoulders, Back, Chest, Arms, Elbows, Hands, Sacrum. Buttock, Coccyx, Ischium, Legs. Feet and Heels, and Under Medical Devices         Does the Patient have a Wound? No noted wound(s)       Mayank Prevention initiated by RN: No  Wound Care Orders initiated by RN: No    Pressure Injury (Stage 3,4, Unstageable, DTI, NWPT, and Complex wounds) if present, place Wound referral order by RN under : No    New Ostomies, if present place, Ostomy referral order under : No     Nurse 1 eSignature: Electronically signed by Marycarmen Leyva RN on 1/10/25 at 3:04 AM EST    **SHARE this note so that the co-signing nurse can place an eSignature**    Nurse 2 eSignature: Electronically signed by Esha Ty RN on 1/10/25 at 5:14 AM EST    
Alyson Olivier was ordered Wheat Dextrin (BENEFIBER) which is a nonformulary medication.  This medication will need to be supplied by the patient as the pharmacy does not carry this non-formulary medication.    If the medication has not been administered by 1400 on the following day from the time the order was placed, a pharmacist will follow-up with the nurse of the patient to assess the capability of the patient to bring in the medication.    If it is determined that the patient cannot supply the medication and it is not available to be dispensed from the pharmacy, the provider will be notified.  Noble Garnett RPH  1/10/2025  4:48 AM  
Consult called to Dr. Pena office  
Dr Gu notified of consult via secure text  [facility]  
Dr. Sheik mcgovern, new orders given.  Deanne De Luna, RN    
New consult messaged to Marisol Hale  
Notified Miroslava Dow NP of patient having 14 Beats of Vtach, currently asymptomatic. New orders received.  
Pharmacy Note    Alyson Olivier was ordered Coenzyme Q-10.  As per the [facility] Formulary Committee Policy, herbals and certain dietary supplements will be discontinued.  The herbal or dietary supplement may be continued after discharge from the hospital.  Noble Garnett RPH  1/10/2025  4:40 AM  
Room air at rest 97% SpO2.  Room air with ambulation 79% SpO2.  While still ambulating on 6 liters O2 96% SpO2.   After ambulation, on 6 liters of O2 for recovery 100% SpO2.   
Spiritual Health History and Assessment/Progress Note  [facility]     Encounter, Rituals, Rites and Sacraments,  ,  ,      Name: Alyson Olivier MRN: [id number]    Age: 64 y.o.     Sex: female   Language: English   Christian: Nondenominational   Bilateral pulmonary embolism (HCC)     Date: 1/10/2025                           Spiritual Assessment began in [address] INTERNAL MEDICINE 2        Referral/Consult From: Rounding   Encounter Overview/Reason:  Encounter, Rituals, Rites and Sacraments  Service Provided For: Patient    Allison, Belief, Meaning:   Patient is connected with a allison tradition or spiritual practice  Family/Friends No family/friends present      Importance and Influence:  Patient has no beliefs influential to healthcare decision-making identified during this visit  Family/Friends No family/friends present    Community:  Patient feels well-supported. Support system includes: Spouse/Partner  Family/Friends No family/friends present    Assessment and Plan of Care:     Patient Interventions include: Affirmed coping skills/support systems and Provided sacramental/Mormon ritual  Family/Friends Interventions include: No family/friends present    Patient Plan of Care: Spiritual Care available upon further referral  Family/Friends Plan of Care: Spiritual Care available upon further referral    Electronically signed by Chaplain Moy on 1/10/2025 at 3:08 PM   
with underlying hypothyroidism on HRT  Hyperlipidemia on statin therapy  Raynaud's disorder  History of severe obesity status post gastric bypass surgery underwent 2003 lost close to 100 pounds  Bronchial asthma  GERD  Prior history of DVT x 2  Lifelong non-smoker.    Hemodynamically stable current blood pressure 109/73.  Mild anemia hemoglobin 12.3>> 10.4        Plan:   Hemodynamically stable, continue Toprol-XL 25 mg p.o. daily.  Echo was already obtained to assess LV function/RV function and to rule out any structural heart disease.  If echocardiogram shows no structural issues then patient can be discharged home from the cardiology standpoint.  Patient needs another repeat echocardiogram in 3 months to rule out chronic thromboembolic pulmonary hypertension  Anticoagulation management of pulmonary embolism unprovoked per pulmonary service.  Management of anemia as per primary service  Management of COVID-19 infection as per primary service  Continue aspirin 81 daily and low-dose Crestor 5 mg p.o. daily.  Consider Lexiscan nuclear stress test as an outpatient due to nonsustained VT once COVID symptoms are resolved.  Please 14-day Zio XT prior to discharge.  Rest as per primary service  If echocardiogram comes back normal then patient is stable for discharge from a cardiology standpoint.  Follow-up with cardiology service in 1 month.      Echo results were reviewed and discussed with the patient LV systolic function and grade 2 diastolic dysfunction no significant VHD.  Patient is stable for discharge from a cardiology standpoint follow-up with [facility] service in 1 month.  Will sign off, please call with any further questions or concerns.    More  than 35 minutes  was spent counseling the patient, reviewing the medications, results, assessment and the rationale for the above recommendations.       NOTE:  This report was transcribed using voice recognition software.  Every effort was made to ensure accuracy;

## 2025-01-13 NOTE — PLAN OF CARE
Problem: Discharge Planning  Goal: Discharge to home or other facility with appropriate resources  1/13/2025 0953 by Dee Dee Greenwood RN  Outcome: Adequate for Discharge  1/12/2025 2321 by Dorinda Davis RN  Outcome: Progressing     Problem: Safety - Adult  Goal: Free from fall injury  1/13/2025 0953 by Dee Dee Greenwood RN  Outcome: Adequate for Discharge  1/12/2025 2321 by Dorinda Davis RN  Outcome: Progressing     Problem: Pain  Goal: Verbalizes/displays adequate comfort level or baseline comfort level  1/13/2025 0953 by Dee Dee Greenwood RN  Outcome: Adequate for Discharge  1/12/2025 2321 by Dorinda Davis RN  Outcome: Progressing

## 2025-01-13 NOTE — PLAN OF CARE
Problem: Discharge Planning  Goal: Discharge to home or other facility with appropriate resources  1/12/2025 2321 by Dorinda Davis, RN  Outcome: Progressing  1/12/2025 1514 by Francie Blackwood RN  Outcome: Progressing     Problem: Safety - Adult  Goal: Free from fall injury  Outcome: Progressing     Problem: Pain  Goal: Verbalizes/displays adequate comfort level or baseline comfort level  Outcome: Progressing

## 2025-01-13 NOTE — CARE COORDINATION
New order for home oxygen received from primary care.  Spoke with patient about same is aware and agreeable to receipt of same.  Offered medical equipment provider choices and patient selected [facility].  Referral was subsequently called to Melissa with [facility].  Anticipate portable equipment delivery to bedside this afternoon.  Agency will then arrange home setup.  Tamir Roth, MSN RN  [facility] Case Management  [phone]

## 2025-01-29 ENCOUNTER — HOSPITAL ENCOUNTER (OUTPATIENT)
Age: 65
Discharge: HOME | End: 2025-01-29
Payer: MEDICARE

## 2025-01-29 PROCEDURE — 86146 BETA-2 GLYCOPROTEIN ANTIBODY: CPT

## 2025-01-29 PROCEDURE — 86147 CARDIOLIPIN ANTIBODY EA IG: CPT

## 2025-01-31 LAB
B2 GLYCOPROT1 IGA SERPL IA-ACNC: 0.4 ELISA U/ML (ref 0–7)
B2 GLYCOPROT1 IGG SERPL IA-ACNC: <0.6 ELISA U/ML (ref 0–7)
B2 GLYCOPROT1 IGM SERPL IA-ACNC: <0.9 ELISA U/ML (ref 0–7)
CARDIOLIPIN IGG SER IA-ACNC: 0.9 GPL (ref 0–10)

## 2025-02-04 DIAGNOSIS — I47.29 NSVT (NONSUSTAINED VENTRICULAR TACHYCARDIA) (HCC): ICD-10-CM

## 2025-02-06 ENCOUNTER — TELEPHONE (OUTPATIENT)
Dept: CARDIOLOGY CLINIC | Age: 65
End: 2025-02-06

## 2025-02-06 NOTE — TELEPHONE ENCOUNTER
----- Message from Dr. Ivania Alvarez MD sent at 2/5/2025  4:27 PM EST -----  Patient's monitor showed predominantly normal sinus rhythm with several short runs of SVT which are nonsustained lasting only few seconds.  Continue current dose of Toprol 25 mg p.o. daily and follow-up with me as scheduled.

## 2025-02-28 ENCOUNTER — HOSPITAL ENCOUNTER (OUTPATIENT)
Dept: GENERAL RADIOLOGY | Age: 65
End: 2025-02-28
Payer: MEDICARE

## 2025-02-28 ENCOUNTER — HOSPITAL ENCOUNTER (OUTPATIENT)
Dept: CT IMAGING | Age: 65
End: 2025-02-28
Payer: MEDICARE

## 2025-02-28 ENCOUNTER — HOSPITAL ENCOUNTER (OUTPATIENT)
Age: 65
Discharge: HOME | End: 2025-02-28
Payer: MEDICARE
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
## 2025-02-28 DIAGNOSIS — S09.90XA INJURY OF HEAD REGION, INITIAL ENCOUNTER: ICD-10-CM

## 2025-02-28 DIAGNOSIS — M25.562 ARTHRALGIA OF LEFT LOWER LEG: ICD-10-CM

## 2025-02-28 PROCEDURE — 70450 CT HEAD/BRAIN W/O DYE: CPT

## 2025-02-28 PROCEDURE — 73564 X-RAY EXAM KNEE 4 OR MORE: CPT

## 2025-03-05 ENCOUNTER — HOSPITAL ENCOUNTER (OUTPATIENT)
Dept: CT IMAGING | Age: 65
Discharge: HOME | End: 2025-03-07
Payer: MEDICARE

## 2025-03-05 ENCOUNTER — HOSPITAL ENCOUNTER (OUTPATIENT)
Dept: ULTRASOUND IMAGING | Age: 65
Discharge: HOME | End: 2025-03-07
Payer: MEDICARE

## 2025-03-05 DIAGNOSIS — S00.83XA TRAUMATIC HEMATOMA OF FOREHEAD, INITIAL ENCOUNTER: ICD-10-CM

## 2025-03-05 DIAGNOSIS — T14.8XXA FRACTURE IN ACCIDENTAL FALL: ICD-10-CM
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
## 2025-03-05 DIAGNOSIS — R51.9 HEADACHE, UNSPECIFIED HEADACHE TYPE: ICD-10-CM

## 2025-03-05 DIAGNOSIS — M79.605 PAIN IN LEFT LEG: ICD-10-CM

## 2025-03-05 DIAGNOSIS — S80.02XA CONTUSION OF LEFT KNEE, INITIAL ENCOUNTER: ICD-10-CM

## 2025-03-05 PROCEDURE — 73700 CT LOWER EXTREMITY W/O DYE: CPT

## 2025-03-05 PROCEDURE — 93971 EXTREMITY STUDY: CPT

## 2025-03-05 PROCEDURE — 70486 CT MAXILLOFACIAL W/O DYE: CPT

## 2025-03-06 DIAGNOSIS — E66.3 OVERWEIGHT: ICD-10-CM

## 2025-03-10 ENCOUNTER — OFFICE VISIT (OUTPATIENT)
Dept: CARDIOLOGY CLINIC | Age: 65
End: 2025-03-10
Payer: MEDICARE

## 2025-03-10 VITALS
HEART RATE: 60 BPM | SYSTOLIC BLOOD PRESSURE: 122 MMHG | TEMPERATURE: 96.9 F | DIASTOLIC BLOOD PRESSURE: 62 MMHG | HEIGHT: 65 IN | OXYGEN SATURATION: 93 % | WEIGHT: 164.3 LBS | RESPIRATION RATE: 18 BRPM | BODY MASS INDEX: 27.38 KG/M2

## 2025-03-10 DIAGNOSIS — R06.02 SHORTNESS OF BREATH: ICD-10-CM

## 2025-03-10 DIAGNOSIS — R94.31 ABNORMAL ELECTROCARDIOGRAPHY: ICD-10-CM

## 2025-03-10 DIAGNOSIS — E78.5 BORDERLINE HYPERLIPIDEMIA: Primary | ICD-10-CM

## 2025-03-10 DIAGNOSIS — Z86.711 HISTORY OF PULMONARY EMBOLISM: ICD-10-CM

## 2025-03-10 PROCEDURE — 1123F ACP DISCUSS/DSCN MKR DOCD: CPT | Performed by: INTERNAL MEDICINE

## 2025-03-10 PROCEDURE — 93000 ELECTROCARDIOGRAM COMPLETE: CPT | Performed by: INTERNAL MEDICINE

## 2025-03-10 PROCEDURE — 99214 OFFICE O/P EST MOD 30 MIN: CPT | Performed by: INTERNAL MEDICINE

## 2025-03-10 PROCEDURE — G8399 PT W/DXA RESULTS DOCUMENT: HCPCS | Performed by: INTERNAL MEDICINE

## 2025-03-10 PROCEDURE — 3017F COLORECTAL CA SCREEN DOC REV: CPT | Performed by: INTERNAL MEDICINE

## 2025-03-10 PROCEDURE — G8427 DOCREV CUR MEDS BY ELIG CLIN: HCPCS | Performed by: INTERNAL MEDICINE

## 2025-03-10 PROCEDURE — 1036F TOBACCO NON-USER: CPT | Performed by: INTERNAL MEDICINE

## 2025-03-10 PROCEDURE — G8419 CALC BMI OUT NRM PARAM NOF/U: HCPCS | Performed by: INTERNAL MEDICINE

## 2025-03-10 PROCEDURE — 1090F PRES/ABSN URINE INCON ASSESS: CPT | Performed by: INTERNAL MEDICINE
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
## 2025-03-10 NOTE — PATIENT INSTRUCTIONS
14-day Holter monitor results were reviewed, as above and discussed the patient patient had several runs of nonsustained SVT otherwise no VT VF no pauses.  No episodes of A-fib.  Repeat limited echo to assess RV function pulmonary hypertension  Schedule for a Lexiscan nuclear stress test to rule out ischemia due to recently diagnosed nonsustained VT.  Continue Eliquis 5 mg p.o. twice daily for anticoagulation  Continue Toprol-XL 25 mg p.o. daily for her nonsustained VT and nonsustained SVT  Rest as per primary service  Follow-up with me in about 3 months

## 2025-04-17 ENCOUNTER — TELEPHONE (OUTPATIENT)
Dept: CARDIOLOGY | Age: 65
End: 2025-04-17

## 2025-04-17 NOTE — TELEPHONE ENCOUNTER
Spoke with patient and confirmed chemical stress test appointment on 4/21/25 at 0830, and ECHO at 1100. Instructions for test reviewed with patient, questions answered. Patient verbalized understanding.

## 2025-04-21 ENCOUNTER — HOSPITAL ENCOUNTER (OUTPATIENT)
Dept: CARDIOLOGY | Age: 65
Discharge: HOME | End: 2025-04-23
Payer: MEDICARE

## 2025-04-21 VITALS
WEIGHT: 160 LBS | RESPIRATION RATE: 16 BRPM | HEART RATE: 58 BPM | SYSTOLIC BLOOD PRESSURE: 150 MMHG | HEIGHT: 65 IN | BODY MASS INDEX: 26.66 KG/M2 | DIASTOLIC BLOOD PRESSURE: 90 MMHG

## 2025-04-21 VITALS — BODY MASS INDEX: 26.66 KG/M2 | HEIGHT: 65 IN | WEIGHT: 160 LBS

## 2025-04-21 DIAGNOSIS — R06.02 SHORTNESS OF BREATH: ICD-10-CM

## 2025-04-21 DIAGNOSIS — R94.31 ABNORMAL ELECTROCARDIOGRAPHY: ICD-10-CM

## 2025-04-21 DIAGNOSIS — Z86.711 HISTORY OF PULMONARY EMBOLISM: ICD-10-CM

## 2025-04-21 LAB
ECHO BSA: 1.82 M2
ECHO BSA: 1.82 M2
ECHO EST RA PRESSURE: 3 MMHG
ECHO IVC PROX: 2.1 CM
ECHO LA VOLUME AREA LENGTH: 80 ML
ECHO LA VOLUME INDEX AREA LENGTH: 44 ML/M2 (ref 16–34)
ECHO LV EF PHYSICIAN: 58 %
ECHO LV FRACTIONAL SHORTENING: 31 % (ref 28–44)
ECHO LV INTERNAL DIMENSION DIASTOLE INDEX: 2.83 CM/M2
ECHO LV INTERNAL DIMENSION DIASTOLIC: 5.1 CM (ref 3.9–5.3)
ECHO LV INTERNAL DIMENSION SYSTOLIC INDEX: 1.94 CM/M2
ECHO LV INTERNAL DIMENSION SYSTOLIC: 3.5 CM
ECHO LV IVSD: 0.9 CM (ref 0.6–0.9)
ECHO LV MASS 2D: 151.8 G (ref 67–162)
ECHO LV MASS INDEX 2D: 84.4 G/M2 (ref 43–95)
ECHO LV POSTERIOR WALL DIASTOLIC: 0.8 CM (ref 0.6–0.9)
ECHO LV RELATIVE WALL THICKNESS RATIO: 0.31
ECHO LVOT AREA: 4.2 CM2
ECHO LVOT DIAM: 2.3 CM
ECHO RA AREA 4C: 17.9 CM2
ECHO RA VOLUME AREA LENGTH APICAL 4 CHAMBER: 47 ML
ECHO RIGHT VENTRICULAR SYSTOLIC PRESSURE (RVSP): 32 MMHG
ECHO RV BASAL DIMENSION: 3.7 CM
ECHO RV INTERNAL DIMENSION: 3.3 CM
ECHO RV LONGITUDINAL DIMENSION: 6.5 CM
ECHO RV MID DIMENSION: 2.1 CM
ECHO RV TAPSE: 2.2 CM (ref 1.7–?)
ECHO TV REGURGITANT MAX VELOCITY: 2.69 M/S
ECHO TV REGURGITANT PEAK GRADIENT: 29 MMHG
NUC STRESS EJECTION FRACTION: 62 %
STRESS BASELINE DIAS BP: 90 MMHG
STRESS BASELINE HR: 61 BPM
STRESS BASELINE SYS BP: 150 MMHG
STRESS ESTIMATED WORKLOAD: 1.1 METS
STRESS PEAK DIAS BP: 80 MMHG
STRESS PEAK SYS BP: 128 MMHG
STRESS PERCENT HR ACHIEVED: 55 %
STRESS POST PEAK HR: 85 BPM
STRESS RATE PRESSURE PRODUCT: NORMAL BPM*MMHG
STRESS TARGET HR: 155 BPM

## 2025-04-21 PROCEDURE — 93016 CV STRESS TEST SUPVJ ONLY: CPT | Performed by: INTERNAL MEDICINE

## 2025-04-21 PROCEDURE — 93017 CV STRESS TEST TRACING ONLY: CPT

## 2025-04-21 PROCEDURE — 93018 CV STRESS TEST I&R ONLY: CPT | Performed by: INTERNAL MEDICINE

## 2025-04-21 PROCEDURE — 6360000002 HC RX W HCPCS: Performed by: INTERNAL MEDICINE

## 2025-04-21 PROCEDURE — 2500000003 HC RX 250 WO HCPCS: Performed by: INTERNAL MEDICINE

## 2025-04-21 PROCEDURE — 3430000000 HC RX DIAGNOSTIC RADIOPHARMACEUTICAL: Performed by: INTERNAL MEDICINE

## 2025-04-21 PROCEDURE — 93308 TTE F-UP OR LMTD: CPT

## 2025-04-21 PROCEDURE — A9500 TC99M SESTAMIBI: HCPCS | Performed by: INTERNAL MEDICINE

## 2025-04-21 PROCEDURE — 78452 HT MUSCLE IMAGE SPECT MULT: CPT | Performed by: INTERNAL MEDICINE

## 2025-04-21 RX ORDER — TETRAKIS(2-METHOXYISOBUTYLISOCYANIDE)COPPER(I) TETRAFLUOROBORATE 1 MG/ML
10.3 INJECTION, POWDER, LYOPHILIZED, FOR SOLUTION INTRAVENOUS
Status: COMPLETED | OUTPATIENT
Start: 2025-04-21 | End: 2025-04-21

## 2025-04-21 RX ORDER — SODIUM CHLORIDE 0.9 % (FLUSH) 0.9 %
10 SYRINGE (ML) INJECTION PRN
Status: DISCONTINUED | OUTPATIENT
Start: 2025-04-21 | End: 2025-04-24 | Stop reason: HOSPADM

## 2025-04-21 RX ORDER — REGADENOSON 0.08 MG/ML
0.4 INJECTION, SOLUTION INTRAVENOUS
Status: COMPLETED | OUTPATIENT
Start: 2025-04-21 | End: 2025-04-21

## 2025-04-21 RX ORDER — TETRAKIS(2-METHOXYISOBUTYLISOCYANIDE)COPPER(I) TETRAFLUOROBORATE 1 MG/ML
31.3 INJECTION, POWDER, LYOPHILIZED, FOR SOLUTION INTRAVENOUS
Status: COMPLETED | OUTPATIENT
Start: 2025-04-21 | End: 2025-04-21

## 2025-04-21 RX ADMIN — Medication 31.3 MILLICURIE: at 10:46

## 2025-04-21 RX ADMIN — SODIUM CHLORIDE, PRESERVATIVE FREE 10 ML: 5 INJECTION INTRAVENOUS at 09:21

## 2025-04-21 RX ADMIN — REGADENOSON 0.4 MG: 0.08 INJECTION, SOLUTION INTRAVENOUS at 10:46

## 2025-04-21 RX ADMIN — Medication 10.3 MILLICURIE: at 09:20

## 2025-04-21 RX ADMIN — SODIUM CHLORIDE, PRESERVATIVE FREE 10 ML: 5 INJECTION INTRAVENOUS at 10:47

## 2025-04-21 RX ADMIN — SODIUM CHLORIDE, PRESERVATIVE FREE 10 ML: 5 INJECTION INTRAVENOUS at 10:46

## 2025-04-22 ENCOUNTER — RESULTS FOLLOW-UP (OUTPATIENT)
Dept: CARDIOLOGY | Age: 65
End: 2025-04-22

## 2025-05-30 ENCOUNTER — OFFICE VISIT (OUTPATIENT)
Dept: CARDIOLOGY CLINIC | Age: 65
End: 2025-05-30

## 2025-05-30 VITALS
TEMPERATURE: 96.9 F | SYSTOLIC BLOOD PRESSURE: 128 MMHG | RESPIRATION RATE: 18 BRPM | BODY MASS INDEX: 27.19 KG/M2 | HEIGHT: 65 IN | WEIGHT: 163.2 LBS | DIASTOLIC BLOOD PRESSURE: 73 MMHG | HEART RATE: 68 BPM | OXYGEN SATURATION: 95 %

## 2025-05-30 DIAGNOSIS — I47.20 VT (VENTRICULAR TACHYCARDIA) (HCC): ICD-10-CM

## 2025-05-30 DIAGNOSIS — R06.02 SHORTNESS OF BREATH: Primary | ICD-10-CM

## 2025-05-30 RX ORDER — LACTULOSE 10 G/15ML
SOLUTION ORAL
COMMUNITY
Start: 2025-04-16

## 2025-05-30 RX ORDER — CIDER VINEGAR 300 MG
TABLET ORAL
COMMUNITY

## 2025-05-30 RX ORDER — FLUTICASONE PROPIONATE 50 MCG
SPRAY, SUSPENSION (ML) NASAL
COMMUNITY
Start: 2025-05-12

## 2025-05-30 RX ORDER — CLOTRIMAZOLE AND BETAMETHASONE DIPROPIONATE 10; .64 MG/G; MG/G
CREAM TOPICAL
COMMUNITY
Start: 2025-05-14

## 2025-05-30 RX ORDER — BUTALBITAL, ACETAMINOPHEN AND CAFFEINE 50; 325; 40 MG/1; MG/1; MG/1
1 TABLET ORAL EVERY 4 HOURS PRN
COMMUNITY
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
## 2025-08-03 DIAGNOSIS — E06.3 HYPOTHYROIDISM DUE TO HASHIMOTO'S THYROIDITIS: ICD-10-CM

## 2025-08-04 ENCOUNTER — TELEPHONE (OUTPATIENT)
Dept: CARDIOLOGY CLINIC | Age: 65
End: 2025-08-04

## 2025-08-05 RX ORDER — LEVOTHYROXINE SODIUM 25 UG/1
TABLET ORAL
Qty: 65 TABLET | Refills: 3 | Status: SHIPPED | OUTPATIENT
Start: 2025-08-05

## 2025-08-11 ENCOUNTER — HOSPITAL ENCOUNTER (OUTPATIENT)
Dept: GENERAL RADIOLOGY | Age: 65
Discharge: HOME | End: 2025-08-13
Payer: MEDICARE

## 2025-08-11 ENCOUNTER — TRANSCRIBE ORDERS (OUTPATIENT)
Dept: GENERAL RADIOLOGY | Age: 65
End: 2025-08-11

## 2025-08-11 DIAGNOSIS — Z01.818 PREOP EXAMINATION: Primary | ICD-10-CM

## 2025-08-11 DIAGNOSIS — Z01.818 PREOP EXAMINATION: ICD-10-CM

## 2025-08-11 PROCEDURE — 71046 X-RAY EXAM CHEST 2 VIEWS: CPT

## 2025-09-04 ENCOUNTER — HOSPITAL ENCOUNTER (OUTPATIENT)
Age: 65
Discharge: HOME | End: 2025-09-06

## 2025-09-04 LAB
ABO + RH BLD: NORMAL
ARM BAND NUMBER: NORMAL
BLOOD BANK SAMPLE EXPIRATION: NORMAL
BLOOD GROUP ANTIBODIES SERPL: NEGATIVE

## 2025-09-04 PROCEDURE — 86901 BLOOD TYPING SEROLOGIC RH(D): CPT

## 2025-09-04 PROCEDURE — 86900 BLOOD TYPING SEROLOGIC ABO: CPT

## 2025-09-04 PROCEDURE — 86850 RBC ANTIBODY SCREEN: CPT

## (undated) DEVICE — KIT BEDSIDE REVITAL OX 500ML

## (undated) DEVICE — Device

## (undated) DEVICE — COLUMN DRAPE

## (undated) DEVICE — KENDALL 450 SERIES MONITORING FOAM ELECTRODE - RECTANGULAR SHAPE ( 3/PK): Brand: KENDALL

## (undated) DEVICE — WARMER SCP LAP

## (undated) DEVICE — CONTAINER SPEC COLL 960ML POLYPR TRIANG GRAD INTAKE/OUTPUT

## (undated) DEVICE — LUBRICANT SURG JELLY ST BACTER TUBE 4.25OZ

## (undated) DEVICE — GOWN ISOLATN REG YEL M WT MULTIPLY SIDETIE LEV 2

## (undated) DEVICE — VALVE SUCTION AIR H2O HYDR H2O JET CONN STRL ORCA POD + DISP

## (undated) DEVICE — DOUBLE BASIN SET: Brand: MEDLINE INDUSTRIES, INC.

## (undated) DEVICE — MEDI-VAC NON-CONDUCTIVE SUCTION TUBING: Brand: CARDINAL HEALTH

## (undated) DEVICE — BLOCK BITE 60FR CAREGUARD

## (undated) DEVICE — SPONGE GZ 4IN 4IN 4 PLY N WVN AVANT

## (undated) DEVICE — MARKER,SKIN,WI/RULER AND LABELS: Brand: MEDLINE

## (undated) DEVICE — ADAPTER CLEANING PORPOISE CLEANING

## (undated) DEVICE — Device: Brand: DEFENDO VALVE AND CONNECTOR KIT

## (undated) DEVICE — FORCEPS BX L240CM JAW DIA2.8MM L CAP W/ NDL MIC MESH TOOTH

## (undated) DEVICE — GASTRIC 45 DEGREE LENS

## (undated) DEVICE — SCOPE DAVINCI XI 30 DEG W/CORD

## (undated) DEVICE — CONTROL SYRINGE LUER-LOCK TIP: Brand: MONOJECT

## (undated) DEVICE — [HIGH FLOW INSUFFLATOR,  DO NOT USE IF PACKAGE IS DAMAGED,  KEEP DRY,  KEEP AWAY FROM SUNLIGHT,  PROTECT FROM HEAT AND RADIOACTIVE SOURCES.]: Brand: PNEUMOSURE

## (undated) DEVICE — TIP-UP FENESTRATED GRASPER: Brand: ENDOWRIST

## (undated) DEVICE — GOWN,SIRUS,NONRNF,SETINSLV,XL,20/CS: Brand: MEDLINE

## (undated) DEVICE — 6 X 9  1.75MIL 4-WALL LABGUARD: Brand: MINIGRIP COMMERCIAL LLC

## (undated) DEVICE — SET ENDO INSTR LAPAROSCOPIC INCISIONAL

## (undated) DEVICE — PUMP SUC IRR TBNG L10FT W/ HNDPC ASSEMB STRYKEFLOW 2

## (undated) DEVICE — Z DISCONTINUED NO SUB IDED DRAIN PENROSE L12IN 0.25IN USED TO PROMOTE DRNAGE IN OPN

## (undated) DEVICE — ARM DRAPE

## (undated) DEVICE — TIP APPL L35CM RIG FOR SEAL EVICEL

## (undated) DEVICE — CANNULA SEAL

## (undated) DEVICE — SCISSORS SURG DIA8MM MPLR CRV ENDOWRIST

## (undated) DEVICE — SHEET DRAPE FULL 70X100

## (undated) DEVICE — ELECTRO LUBE IS A SINGLE PATIENT USE DEVICE THAT IS INTENDED TO BE USED ON ELECTROSURGICAL ELECTRODES TO REDUCE STICKING.: Brand: KEY SURGICAL ELECTRO LUBE

## (undated) DEVICE — MEDI-VAC YANKAUER SUCTION HANDLE W/BULBOUS TIP: Brand: CARDINAL HEALTH

## (undated) DEVICE — TOWEL,OR,DSP,ST,BLUE,STD,6/PK,12PK/CS: Brand: MEDLINE

## (undated) DEVICE — MEGA SUTURECUT ND: Brand: ENDOWRIST

## (undated) DEVICE — SET MAJOR INSTR HOUSE

## (undated) DEVICE — SHEARS LAP L45CM DIA5MM ULTRASONIC CRV TIP ADV HEMSTAS HARM

## (undated) DEVICE — TIP COVER ACCESSORY

## (undated) DEVICE — SUCTION IRRIGATOR: Brand: ENDOWRIST

## (undated) DEVICE — SUTURE ABSRB L6IN L37MM 0 GS-21 GRN 1/2 CIR TAPR PNT NDL VLOCL0306

## (undated) DEVICE — BLADELESS OBTURATOR: Brand: WECK VISTA

## (undated) DEVICE — SUTURE DEV SZ 0 L6IN N ABSORBABLE

## (undated) DEVICE — COVER,TABLE,44X90,STERILE: Brand: MEDLINE

## (undated) DEVICE — NEEDLE INSUF L120MM DIA2MM DISP FOR PNEUMOPERI ENDOPATH

## (undated) DEVICE — CHLORAPREP 26ML ORANGE

## (undated) DEVICE — MASK,FACE,MAXFLUIDPROTECT,SHIELD/ERLPS: Brand: MEDLINE

## (undated) DEVICE — SCOPE DAVINCI XI 0 DEG W/CORD

## (undated) DEVICE — GLOVE ORANGE PI 7 1/2   MSG9075

## (undated) DEVICE — PATIENT RETURN ELECTRODE, SINGLE-USE, CONTACT QUALITY MONITORING, ADULT, WITH 9FT CORD, FOR PATIENTS WEIGING OVER 33LBS. (15KG): Brand: MEGADYNE

## (undated) DEVICE — STANDARD HYPODERMIC NEEDLE,POLYPROPYLENE HUB: Brand: MONOJECT

## (undated) DEVICE — CADIERE FORCEPS: Brand: ENDOWRIST

## (undated) DEVICE — CAMERA STRYKER 1488 HD GEN

## (undated) DEVICE — PACK SURG LAP CHOLE CUSTOM

## (undated) DEVICE — GARMENT,MEDLINE,DVT,INT,CALF,MED, GEN2: Brand: MEDLINE